# Patient Record
Sex: FEMALE | Race: WHITE | NOT HISPANIC OR LATINO | Employment: UNEMPLOYED | URBAN - METROPOLITAN AREA
[De-identification: names, ages, dates, MRNs, and addresses within clinical notes are randomized per-mention and may not be internally consistent; named-entity substitution may affect disease eponyms.]

---

## 2017-03-03 ENCOUNTER — HISTORICAL (OUTPATIENT)
Dept: LAB | Facility: HOSPITAL | Age: 47
End: 2017-03-03

## 2017-03-13 ENCOUNTER — HISTORICAL (OUTPATIENT)
Dept: RADIOLOGY | Facility: HOSPITAL | Age: 47
End: 2017-03-13

## 2017-05-08 LAB — POC BETA-HCG (QUAL): NEGATIVE

## 2017-06-07 ENCOUNTER — HISTORICAL (OUTPATIENT)
Dept: LAB | Facility: HOSPITAL | Age: 47
End: 2017-06-07

## 2017-06-07 LAB
DEPRECATED CALCIDIOL+CALCIFEROL SERPL-MC: 31.18 NG/ML (ref 30–80)
EST. AVERAGE GLUCOSE BLD GHB EST-MCNC: 117 MG/DL
HBA1C MFR BLD: 5.7 % (ref 4.5–6.2)

## 2017-06-15 LAB — RAPID GROUP A STREP (OHS): NEGATIVE

## 2017-07-04 ENCOUNTER — HOSPITAL ENCOUNTER (EMERGENCY)
Facility: HOSPITAL | Age: 47
Discharge: HOME | End: 2017-07-04
Attending: EMERGENCY MEDICINE
Payer: MEDICAID

## 2017-07-04 VITALS
TEMPERATURE: 98 F | SYSTOLIC BLOOD PRESSURE: 148 MMHG | WEIGHT: 250 LBS | RESPIRATION RATE: 16 BRPM | DIASTOLIC BLOOD PRESSURE: 71 MMHG | BODY MASS INDEX: 44.3 KG/M2 | OXYGEN SATURATION: 99 % | HEART RATE: 98 BPM | HEIGHT: 63 IN

## 2017-07-04 DIAGNOSIS — R19.7 VOMITING AND DIARRHEA: Primary | ICD-10-CM

## 2017-07-04 DIAGNOSIS — R11.10 VOMITING AND DIARRHEA: Primary | ICD-10-CM

## 2017-07-04 DIAGNOSIS — R11.10 VOMITING: ICD-10-CM

## 2017-07-04 LAB
ALBUMIN SERPL BCP-MCNC: 4 G/DL
ALP SERPL-CCNC: 85 U/L
ALT SERPL W/O P-5'-P-CCNC: 19 U/L
ANION GAP SERPL CALC-SCNC: 10 MMOL/L
AST SERPL-CCNC: 18 U/L
BASOPHILS # BLD AUTO: 0 K/UL
BASOPHILS NFR BLD: 0.1 %
BILIRUB SERPL-MCNC: 0.8 MG/DL
BUN SERPL-MCNC: 17 MG/DL
CALCIUM SERPL-MCNC: 9.2 MG/DL
CHLORIDE SERPL-SCNC: 106 MMOL/L
CO2 SERPL-SCNC: 22 MMOL/L
CREAT SERPL-MCNC: 0.8 MG/DL
DIFFERENTIAL METHOD: ABNORMAL
EOSINOPHIL # BLD AUTO: 0 K/UL
EOSINOPHIL NFR BLD: 0 %
ERYTHROCYTE [DISTWIDTH] IN BLOOD BY AUTOMATED COUNT: 14.5 %
EST. GFR  (AFRICAN AMERICAN): >60 ML/MIN/1.73 M^2
EST. GFR  (NON AFRICAN AMERICAN): >60 ML/MIN/1.73 M^2
GLUCOSE SERPL-MCNC: 126 MG/DL
HCT VFR BLD AUTO: 39.2 %
HGB BLD-MCNC: 12.9 G/DL
LYMPHOCYTES # BLD AUTO: 0.4 K/UL
LYMPHOCYTES NFR BLD: 2.4 %
MCH RBC QN AUTO: 25.7 PG
MCHC RBC AUTO-ENTMCNC: 32.8 %
MCV RBC AUTO: 78 FL
MONOCYTES # BLD AUTO: 0.2 K/UL
MONOCYTES NFR BLD: 1.1 %
NEUTROPHILS # BLD AUTO: 14.9 K/UL
NEUTROPHILS NFR BLD: 96.4 %
PLATELET # BLD AUTO: 373 K/UL
PMV BLD AUTO: 8.6 FL
POTASSIUM SERPL-SCNC: 4.3 MMOL/L
PROT SERPL-MCNC: 8.3 G/DL
RBC # BLD AUTO: 5.01 M/UL
SODIUM SERPL-SCNC: 138 MMOL/L
WBC # BLD AUTO: 15.5 K/UL

## 2017-07-04 PROCEDURE — 80053 COMPREHEN METABOLIC PANEL: CPT

## 2017-07-04 PROCEDURE — 96374 THER/PROPH/DIAG INJ IV PUSH: CPT

## 2017-07-04 PROCEDURE — 96361 HYDRATE IV INFUSION ADD-ON: CPT

## 2017-07-04 PROCEDURE — 63600175 PHARM REV CODE 636 W HCPCS: Performed by: PHYSICIAN ASSISTANT

## 2017-07-04 PROCEDURE — 36415 COLL VENOUS BLD VENIPUNCTURE: CPT

## 2017-07-04 PROCEDURE — 25000003 PHARM REV CODE 250: Performed by: PHYSICIAN ASSISTANT

## 2017-07-04 PROCEDURE — 99284 EMERGENCY DEPT VISIT MOD MDM: CPT | Mod: 25

## 2017-07-04 PROCEDURE — 85025 COMPLETE CBC W/AUTO DIFF WBC: CPT

## 2017-07-04 RX ORDER — PROMETHAZINE HYDROCHLORIDE 25 MG/1
25 SUPPOSITORY RECTAL EVERY 6 HOURS PRN
Qty: 10 SUPPOSITORY | Refills: 0 | Status: SHIPPED | OUTPATIENT
Start: 2017-07-04 | End: 2022-05-17

## 2017-07-04 RX ORDER — ONDANSETRON 2 MG/ML
4 INJECTION INTRAMUSCULAR; INTRAVENOUS
Status: COMPLETED | OUTPATIENT
Start: 2017-07-04 | End: 2017-07-04

## 2017-07-04 RX ORDER — ONDANSETRON 4 MG/1
4 TABLET, FILM COATED ORAL EVERY 6 HOURS
Qty: 15 TABLET | Refills: 0 | Status: SHIPPED | OUTPATIENT
Start: 2017-07-04 | End: 2017-07-04 | Stop reason: ALTCHOICE

## 2017-07-04 RX ADMIN — ONDANSETRON 4 MG: 2 INJECTION INTRAMUSCULAR; INTRAVENOUS at 01:07

## 2017-07-04 RX ADMIN — SODIUM CHLORIDE 1000 ML: 0.9 INJECTION, SOLUTION INTRAVENOUS at 01:07

## 2017-07-04 NOTE — DISCHARGE INSTRUCTIONS
Drink plenty of fluids.  Take zofran as needed for the nausea.  See your primary care provider in one week.  For worsening symptoms, chest pain, shortness of breath, increased abdominal pain, high grade fever, stroke or stroke like symptoms, immediately go to the nearest Emergency Room or call 911 as soon as possible.

## 2017-07-19 ENCOUNTER — HISTORICAL (OUTPATIENT)
Dept: RADIOLOGY | Facility: HOSPITAL | Age: 47
End: 2017-07-19

## 2017-08-30 ENCOUNTER — HISTORICAL (OUTPATIENT)
Dept: LAB | Facility: HOSPITAL | Age: 47
End: 2017-08-30

## 2017-08-30 LAB
EST. AVERAGE GLUCOSE BLD GHB EST-MCNC: 128 MG/DL
HBA1C MFR BLD: 6.1 % (ref 4.5–6.2)

## 2017-11-06 LAB
INFLUENZA A ANTIGEN, POC: NEGATIVE
INFLUENZA B ANTIGEN, POC: NEGATIVE

## 2017-12-11 ENCOUNTER — HISTORICAL (OUTPATIENT)
Dept: LAB | Facility: HOSPITAL | Age: 47
End: 2017-12-11

## 2017-12-11 LAB
BUN SERPL-MCNC: 12 MG/DL (ref 7–18)
CALCIUM SERPL-MCNC: 9.1 MG/DL (ref 8.5–10.1)
CHLORIDE SERPL-SCNC: 103 MMOL/L (ref 98–107)
CO2 SERPL-SCNC: 28.4 MMOL/L (ref 21–32)
CREAT SERPL-MCNC: 0.66 MG/DL (ref 0.6–1.3)
EST. AVERAGE GLUCOSE BLD GHB EST-MCNC: 120 MG/DL
GLUCOSE SERPL-MCNC: 103 MG/DL (ref 74–106)
HBA1C MFR BLD: 5.8 % (ref 4.5–6.2)
POTASSIUM SERPL-SCNC: 4.5 MMOL/L (ref 3.5–5.1)
SODIUM SERPL-SCNC: 140 MMOL/L (ref 136–145)

## 2017-12-28 LAB
INFLUENZA A ANTIGEN, POC: POSITIVE
INFLUENZA B ANTIGEN, POC: NEGATIVE

## 2018-03-26 ENCOUNTER — HISTORICAL (OUTPATIENT)
Dept: LAB | Facility: HOSPITAL | Age: 48
End: 2018-03-26

## 2018-03-26 LAB
ABS NEUT (OLG): 5.7 X10(3)/MCL (ref 2.1–9.2)
BASOPHILS # BLD AUTO: 0.1 X10(3)/MCL
BASOPHILS NFR BLD AUTO: 1 % (ref 0–2)
CHOLEST SERPL-MCNC: 164 MG/DL (ref 0–200)
CHOLEST/HDLC SERPL: 3.6 {RATIO} (ref 0–4)
EOSINOPHIL # BLD AUTO: 0.2 X10(3)/MCL
EOSINOPHIL NFR BLD AUTO: 2 %
ERYTHROCYTE [DISTWIDTH] IN BLOOD BY AUTOMATED COUNT: 15 % (ref 11.5–17)
EST. AVERAGE GLUCOSE BLD GHB EST-MCNC: 120 MG/DL
HBA1C MFR BLD: 5.8 % (ref 4.5–6.2)
HCT VFR BLD AUTO: 34.3 % (ref 37–47)
HDLC SERPL-MCNC: 45 MG/DL (ref 40–60)
HGB BLD-MCNC: 10.6 GM/DL (ref 12–16)
LDLC SERPL CALC-MCNC: 97 MG/DL (ref 0–129)
LYMPHOCYTES # BLD AUTO: 2.2 X10(3)/MCL
LYMPHOCYTES NFR BLD AUTO: 24 % (ref 13–40)
MCH RBC QN AUTO: 23.5 PG (ref 27–31)
MCHC RBC AUTO-ENTMCNC: 30.8 GM/DL (ref 33–36)
MCV RBC AUTO: 76.2 FL (ref 80–94)
MONOCYTES # BLD AUTO: 0.8 X10(3)/MCL
MONOCYTES NFR BLD AUTO: 9 % (ref 2–11)
NEUTROPHILS # BLD AUTO: 5.7 X10(3)/MCL (ref 2.1–9.2)
NEUTROPHILS NFR BLD AUTO: 64 % (ref 47–80)
PLATELET # BLD AUTO: 445 X10(3)/MCL (ref 130–400)
PMV BLD AUTO: 8.9 FL (ref 7.4–10.4)
RBC # BLD AUTO: 4.51 X10(6)/MCL (ref 4.2–5.4)
TRIGL SERPL-MCNC: 109 MG/DL
VLDLC SERPL CALC-MCNC: 22 MG/DL
WBC # SPEC AUTO: 9 X10(3)/MCL (ref 4.5–11.5)

## 2018-04-11 ENCOUNTER — HISTORICAL (OUTPATIENT)
Dept: LAB | Facility: HOSPITAL | Age: 48
End: 2018-04-11

## 2018-04-20 LAB — RAPID GROUP A STREP (OHS): NEGATIVE

## 2018-04-24 ENCOUNTER — HISTORICAL (OUTPATIENT)
Dept: RADIOLOGY | Facility: HOSPITAL | Age: 48
End: 2018-04-24

## 2018-06-27 ENCOUNTER — HISTORICAL (OUTPATIENT)
Dept: LAB | Facility: HOSPITAL | Age: 48
End: 2018-06-27

## 2018-06-27 LAB
ABS NEUT (OLG): 6.4 X10(3)/MCL (ref 2.1–9.2)
BASOPHILS # BLD AUTO: 0.1 X10(3)/MCL
BASOPHILS NFR BLD AUTO: 1 % (ref 0–2)
EOSINOPHIL # BLD AUTO: 0.2 X10(3)/MCL
EOSINOPHIL NFR BLD AUTO: 2 %
ERYTHROCYTE [DISTWIDTH] IN BLOOD BY AUTOMATED COUNT: 16 % (ref 11.5–17)
HCT VFR BLD AUTO: 35.8 % (ref 37–47)
HGB BLD-MCNC: 11 GM/DL (ref 12–16)
LYMPHOCYTES # BLD AUTO: 2.6 X10(3)/MCL
LYMPHOCYTES NFR BLD AUTO: 25 % (ref 13–40)
MCH RBC QN AUTO: 22.8 PG (ref 27–31)
MCHC RBC AUTO-ENTMCNC: 30.7 GM/DL (ref 33–36)
MCV RBC AUTO: 74.3 FL (ref 80–94)
MONOCYTES # BLD AUTO: 1 X10(3)/MCL
MONOCYTES NFR BLD AUTO: 10 % (ref 2–11)
NEUTROPHILS # BLD AUTO: 6.4 X10(3)/MCL (ref 2.1–9.2)
NEUTROPHILS NFR BLD AUTO: 62 % (ref 47–80)
PLATELET # BLD AUTO: 480 X10(3)/MCL (ref 130–400)
PMV BLD AUTO: 9.2 FL (ref 7.4–10.4)
RBC # BLD AUTO: 4.82 X10(6)/MCL (ref 4.2–5.4)
WBC # SPEC AUTO: 10.2 X10(3)/MCL (ref 4.5–11.5)

## 2018-07-02 LAB — FINAL CULTURE: NORMAL

## 2018-07-16 ENCOUNTER — HISTORICAL (OUTPATIENT)
Dept: LAB | Facility: HOSPITAL | Age: 48
End: 2018-07-16

## 2018-07-16 LAB
BILIRUB SERPL-MCNC: NEGATIVE MG/DL
BLOOD URINE, POC: NORMAL
CLARITY, POC UA: NORMAL
COLOR, POC UA: NORMAL
GLUCOSE UR QL STRIP: NEGATIVE
KETONES UR QL STRIP: NEGATIVE
LEUKOCYTE EST, POC UA: NEGATIVE
NITRITE, POC UA: NEGATIVE
PH, POC UA: 5.5
PROTEIN, POC: NORMAL
SPECIFIC GRAVITY, POC UA: 1.03
UROBILINOGEN, POC UA: NORMAL

## 2018-07-19 LAB — FINAL CULTURE: NO GROWTH

## 2018-07-20 LAB — FINAL CULTURE: NORMAL

## 2018-07-23 ENCOUNTER — HISTORICAL (OUTPATIENT)
Dept: LAB | Facility: HOSPITAL | Age: 48
End: 2018-07-23

## 2018-07-23 LAB
ABS NEUT (OLG): 4.3 X10(3)/MCL (ref 2.1–9.2)
ALBUMIN SERPL-MCNC: 3.9 GM/DL (ref 3.4–5)
ALBUMIN/GLOB SERPL: 1.1 RATIO (ref 1.1–2)
ALP SERPL-CCNC: 91 UNIT/L (ref 46–116)
ALT SERPL-CCNC: 23 UNIT/L (ref 12–78)
AST SERPL-CCNC: 19 UNIT/L (ref 15–37)
BASOPHILS # BLD AUTO: 0.1 X10(3)/MCL
BASOPHILS NFR BLD AUTO: 1 % (ref 0–2)
BILIRUB SERPL-MCNC: 0.3 MG/DL (ref 0.2–1)
BILIRUBIN DIRECT+TOT PNL SERPL-MCNC: 0.08 MG/DL (ref 0–0.2)
BILIRUBIN DIRECT+TOT PNL SERPL-MCNC: 0.22 MG/DL (ref 0–0.8)
BUN SERPL-MCNC: 10.2 MG/DL (ref 7–18)
CALCIUM SERPL-MCNC: 8.9 MG/DL (ref 8.5–10.1)
CHLORIDE SERPL-SCNC: 102 MMOL/L (ref 98–107)
CO2 SERPL-SCNC: 26.1 MMOL/L (ref 21–32)
CREAT SERPL-MCNC: 0.75 MG/DL (ref 0.6–1.3)
EOSINOPHIL # BLD AUTO: 0.2 X10(3)/MCL
EOSINOPHIL NFR BLD AUTO: 3 %
ERYTHROCYTE [DISTWIDTH] IN BLOOD BY AUTOMATED COUNT: 19.9 % (ref 11.5–17)
EST. AVERAGE GLUCOSE BLD GHB EST-MCNC: 114 MG/DL
FERRITIN SERPL-MCNC: 27 NG/ML (ref 8–388)
GLOBULIN SER-MCNC: 3.7 GM/DL (ref 2.4–3.5)
GLUCOSE SERPL-MCNC: 94 MG/DL (ref 74–106)
HBA1C MFR BLD: 5.6 % (ref 4.5–6.2)
HCT VFR BLD AUTO: 37.3 % (ref 37–47)
HGB BLD-MCNC: 11.7 GM/DL (ref 12–16)
IRON SATN MFR SERPL: 12.9 % (ref 20–50)
IRON SERPL-MCNC: 44 MCG/DL (ref 50–175)
LYMPHOCYTES # BLD AUTO: 2.3 X10(3)/MCL
LYMPHOCYTES NFR BLD AUTO: 30 % (ref 13–40)
MCH RBC QN AUTO: 24.1 PG (ref 27–31)
MCHC RBC AUTO-ENTMCNC: 31.3 GM/DL (ref 33–36)
MCV RBC AUTO: 76.9 FL (ref 80–94)
MONOCYTES # BLD AUTO: 0.7 X10(3)/MCL
MONOCYTES NFR BLD AUTO: 9 % (ref 2–11)
NEUTROPHILS # BLD AUTO: 4.3 X10(3)/MCL (ref 2.1–9.2)
NEUTROPHILS NFR BLD AUTO: 57 % (ref 47–80)
PLATELET # BLD AUTO: 480 X10(3)/MCL (ref 130–400)
PMV BLD AUTO: 9.1 FL (ref 7.4–10.4)
POTASSIUM SERPL-SCNC: 4.7 MMOL/L (ref 3.5–5.1)
PROT SERPL-MCNC: 7.6 GM/DL (ref 6.4–8.2)
RBC # BLD AUTO: 4.85 X10(6)/MCL (ref 4.2–5.4)
SODIUM SERPL-SCNC: 140 MMOL/L (ref 136–145)
TIBC SERPL-MCNC: 342 MCG/DL (ref 250–450)
TRANSFERRIN SERPL-MCNC: 244 MG/DL (ref 200–360)
WBC # SPEC AUTO: 7.6 X10(3)/MCL (ref 4.5–11.5)

## 2018-08-21 ENCOUNTER — HISTORICAL (OUTPATIENT)
Dept: RADIOLOGY | Facility: HOSPITAL | Age: 48
End: 2018-08-21

## 2018-09-24 ENCOUNTER — HISTORICAL (OUTPATIENT)
Dept: SURGERY | Facility: HOSPITAL | Age: 48
End: 2018-09-24

## 2018-09-24 LAB
APPEARANCE, UA: ABNORMAL
BACTERIA SPEC CULT: ABNORMAL /HPF
BILIRUB UR QL STRIP: NEGATIVE
COLOR UR: ABNORMAL
GLUCOSE (UA): NEGATIVE
HGB UR QL STRIP: ABNORMAL
KETONES UR QL STRIP: NEGATIVE
LEUKOCYTE ESTERASE UR QL STRIP: NEGATIVE
NITRITE UR QL STRIP: NEGATIVE
PH UR STRIP: 6 [PH]
PROT UR QL STRIP: NEGATIVE
RBC #/AREA URNS HPF: ABNORMAL /HPF
SP GR UR STRIP: >=1.03
SQUAMOUS EPITHELIAL, UA: ABNORMAL /LPF
UROBILINOGEN UR STRIP-ACNC: 0.2 EU/DL
WBC #/AREA URNS HPF: ABNORMAL /[HPF]

## 2018-09-25 ENCOUNTER — HISTORICAL (OUTPATIENT)
Dept: ANESTHESIOLOGY | Facility: HOSPITAL | Age: 48
End: 2018-09-25

## 2018-12-20 ENCOUNTER — OFFICE VISIT (OUTPATIENT)
Dept: SURGERY | Facility: CLINIC | Age: 48
End: 2018-12-20
Payer: MEDICAID

## 2018-12-20 ENCOUNTER — HOSPITAL ENCOUNTER (OUTPATIENT)
Dept: RADIOLOGY | Facility: HOSPITAL | Age: 48
Discharge: HOME | End: 2018-12-20
Attending: SURGERY
Payer: MEDICAID

## 2018-12-20 VITALS
TEMPERATURE: 98 F | DIASTOLIC BLOOD PRESSURE: 90 MMHG | BODY MASS INDEX: 44.68 KG/M2 | SYSTOLIC BLOOD PRESSURE: 139 MMHG | WEIGHT: 252.19 LBS | HEIGHT: 63 IN | HEART RATE: 95 BPM

## 2018-12-20 DIAGNOSIS — K21.00 GASTROESOPHAGEAL REFLUX DISEASE WITH ESOPHAGITIS: ICD-10-CM

## 2018-12-20 DIAGNOSIS — R11.0 NAUSEA: ICD-10-CM

## 2018-12-20 DIAGNOSIS — K20.90 ESOPHAGITIS: Primary | ICD-10-CM

## 2018-12-20 DIAGNOSIS — R11.0 NAUSEA: Primary | ICD-10-CM

## 2018-12-20 DIAGNOSIS — Z98.84 HX OF LAPAROSCOPIC ADJUSTABLE GASTRIC BANDING: ICD-10-CM

## 2018-12-20 PROCEDURE — 74241 FL UPPER GI W KUB: CPT | Mod: TC,FY

## 2018-12-20 PROCEDURE — 99204 OFFICE O/P NEW MOD 45 MIN: CPT | Mod: S$PBB,,, | Performed by: SURGERY

## 2018-12-20 PROCEDURE — 71046 X-RAY EXAM CHEST 2 VIEWS: CPT | Mod: 26,,, | Performed by: RADIOLOGY

## 2018-12-20 PROCEDURE — 71046 X-RAY EXAM CHEST 2 VIEWS: CPT | Mod: TC,FY

## 2018-12-20 PROCEDURE — 99213 OFFICE O/P EST LOW 20 MIN: CPT | Mod: PBBFAC,25 | Performed by: SURGERY

## 2018-12-20 PROCEDURE — 99999 PR PBB SHADOW E&M-EST. PATIENT-LVL III: CPT | Mod: PBBFAC,,, | Performed by: SURGERY

## 2018-12-20 PROCEDURE — 74241 FL UPPER GI W KUB: CPT | Mod: 26,,, | Performed by: RADIOLOGY

## 2018-12-20 RX ORDER — FERROUS SULFATE 325(65) MG
1 TABLET ORAL DAILY
Refills: 0 | COMMUNITY
Start: 2018-10-29 | End: 2018-12-20 | Stop reason: SDUPTHER

## 2018-12-20 RX ORDER — DOXYCYCLINE HYCLATE 100 MG
TABLET ORAL
Refills: 0 | COMMUNITY
Start: 2018-10-01 | End: 2022-05-17

## 2018-12-20 RX ORDER — CIPROFLOXACIN 500 MG/1
TABLET ORAL
Refills: 0 | COMMUNITY
Start: 2018-10-05 | End: 2022-05-17 | Stop reason: ALTCHOICE

## 2018-12-20 RX ORDER — FERROUS SULFATE 325(65) MG
TABLET ORAL
COMMUNITY
Start: 2018-07-31 | End: 2022-07-12

## 2018-12-20 RX ORDER — ALBUTEROL SULFATE 90 UG/1
AEROSOL, METERED RESPIRATORY (INHALATION)
COMMUNITY
Start: 2018-04-27 | End: 2023-07-25 | Stop reason: SDUPTHER

## 2018-12-20 RX ORDER — MECLIZINE HYDROCHLORIDE 25 MG/1
TABLET ORAL
COMMUNITY
Start: 2018-07-23 | End: 2022-09-01 | Stop reason: SDUPTHER

## 2018-12-20 RX ORDER — TRIAMCINOLONE ACETONIDE 0.25 MG/G
CREAM TOPICAL
COMMUNITY
Start: 2018-04-27 | End: 2022-09-07 | Stop reason: ALTCHOICE

## 2018-12-20 RX ORDER — HYDROCODONE BITARTRATE AND ACETAMINOPHEN 5; 325 MG/1; MG/1
TABLET ORAL
COMMUNITY
Start: 2018-07-23 | End: 2022-05-17

## 2018-12-20 RX ORDER — OXYCODONE AND ACETAMINOPHEN 5; 325 MG/1; MG/1
1 TABLET ORAL EVERY 6 HOURS PRN
Refills: 0 | COMMUNITY
Start: 2018-09-25 | End: 2022-05-17

## 2018-12-20 RX ORDER — HYDROXYZINE HYDROCHLORIDE 25 MG/1
TABLET, FILM COATED ORAL
COMMUNITY
Start: 2018-06-28 | End: 2022-05-17

## 2018-12-20 RX ORDER — METRONIDAZOLE 500 MG/1
TABLET ORAL
Refills: 0 | COMMUNITY
Start: 2018-10-05 | End: 2022-05-17

## 2018-12-20 RX ORDER — MECLIZINE HYDROCHLORIDE 25 MG/1
25 TABLET ORAL 3 TIMES DAILY PRN
Refills: 0 | COMMUNITY
Start: 2018-12-03 | End: 2022-05-17 | Stop reason: SDUPTHER

## 2018-12-20 RX ORDER — MUPIROCIN 20 MG/G
OINTMENT TOPICAL
COMMUNITY
Start: 2018-07-30 | End: 2022-05-17

## 2018-12-20 RX ORDER — OMEPRAZOLE 40 MG/1
40 CAPSULE, DELAYED RELEASE ORAL EVERY MORNING
Refills: 10 | COMMUNITY
Start: 2018-10-29 | End: 2018-12-20 | Stop reason: SDUPTHER

## 2018-12-20 RX ORDER — METFORMIN HYDROCHLORIDE 500 MG/1
TABLET ORAL
Refills: 0 | COMMUNITY
Start: 2018-10-29 | End: 2022-07-12

## 2018-12-20 RX ORDER — IBUPROFEN 800 MG/1
TABLET ORAL
COMMUNITY
Start: 2018-09-17 | End: 2022-05-17 | Stop reason: CLARIF

## 2018-12-20 RX ORDER — SULFAMETHOXAZOLE AND TRIMETHOPRIM 800; 160 MG/1; MG/1
1 TABLET ORAL 2 TIMES DAILY
Refills: 0 | COMMUNITY
Start: 2018-10-15 | End: 2022-05-17

## 2018-12-20 RX ORDER — DOCUSATE SODIUM 100 MG/1
CAPSULE, LIQUID FILLED ORAL
Refills: 0 | COMMUNITY
Start: 2018-11-05 | End: 2022-09-07

## 2018-12-20 RX ORDER — OMEPRAZOLE 40 MG/1
40 CAPSULE, DELAYED RELEASE ORAL
COMMUNITY
Start: 2018-06-14 | End: 2022-09-07

## 2018-12-20 RX ORDER — METFORMIN HYDROCHLORIDE 500 MG/1
TABLET ORAL
COMMUNITY
Start: 2018-07-23 | End: 2022-07-12

## 2018-12-20 RX ORDER — CALCIUM CITRATE/VITAMIN D3 200MG-6.25
TABLET ORAL
Refills: 2 | COMMUNITY
Start: 2018-10-17

## 2018-12-20 NOTE — LETTER
December 22, 2018      Heriberto Gonzalez MD  [address] [facility] [address]  [facility] [address]           [facility] - General Surgery  [address]  [place] [address]  Phone: [phone]          Patient: Lilian Arroyo   MR Number: [id number]   YOB: 1970   Date of Visit: 12/20/2018       Dear Dr. Heriberto Gonzalez:    Thank you for referring Lilian Arroyo to me for evaluation. Attached you will find relevant portions of my assessment and plan of care.    If you have questions, please do not hesitate to call me. I look forward to following Lilian Arroyo along with you.    Sincerely,    Xochitl Landry MD    Enclosure  CC:  No Recipients    If you would like to receive this communication electronically, please contact [email or web address] or [phone] to request more information on [employer] access.    For providers and/or their staff who would like to refer a patient to [facility], please contact us through our one-stop-shop provider referral line, [facility] , at [phone].    If you feel you have received this communication in error or would no longer like to receive these types of communications, please e-mail [email or web address]

## 2018-12-20 NOTE — PROGRESS NOTES
History & Physical    SUBJECTIVE:     History of Present Illness:  Patient is a 48 y.o. female presents with chronic nausea that is usually post-prandial and vomiting 1-2x/week. She also endorses acid reflux with pain starting in the epigastrum and radiating up her throat. Eating fried foods worsens her symptoms. She takes Omeprazole for heartburn and it sometimes helps.     She was seen by referring provider Dr. Gonzalez in [place] and underwent EGD which showed: normal duodenum, gastritis, area proximal to the band with retained food, esophagitis and irregularity in the GE junction.  An EGD performed in 2017 apparently showed a hiatal hernia.    She had a lap gastric band placed in 2006 and lost 85 lbs, gained 50 lb back. Her and her  think there may be nothing in the band at this time. Lap roel in 2001.  She also has hidradenitis of both axillae and had excision 2 months ago on the right side; states the wound opened about 2 weeks ago and she has been doing local wound care.    Review of patient's allergies indicates:  No Known Allergies    Current Outpatient Medications   Medication Sig Dispense Refill    albuterol (VENTOLIN HFA) 90 mcg/actuation inhaler inhale1 puff by mouth 4 times per day as needed for wheezing      docusate sodium (COLACE) 100 MG capsule TAKE ONE CAPSULE BY MOUTH TWICE DAILY as needed for FOR CONSTIPATION  0    ferrous sulfate (FEOSOL) 325 mg (65 mg iron) Tab tablet       ibuprofen (ADVIL,MOTRIN) 800 MG tablet       meclizine (ANTIVERT) 25 mg tablet TAKE ONE TABLET BY MOUTH THREE TIMES DAILY as needed for dizziness      metFORMIN (GLUCOPHAGE) 500 MG tablet TAKE ONE TABLET BY MOUTH DAILY with a meal      omeprazole (PRILOSEC) 40 MG capsule Take 40 mg by mouth.      promethazine (PHENERGAN) 25 MG suppository Place 1 suppository (25 mg total) rectally every 6 (six) hours as needed for Nausea. 10 suppository 0    triamcinolone acetonide 0.025% (KENALOG) 0.025 % cream apply  topically to the affected area 4 times per day as needed for rash for 14 days      ciprofloxacin HCl (CIPRO) 500 MG tablet TAKE ONE TABLET BY MOUTH EVERY TWELVE HOURS FOR 7 DAYS  0    doxycycline (VIBRA-TABS) 100 MG tablet TAKE ONE TABLET BY MOUTH TWICE DAILY FOR FOURTEEN DAYS  0    HYDROcodone-acetaminophen (NORCO) 5-325 mg per tablet TAKE TWO TABLETS BY MOUTH EVERY 6 HOURS AS NEEDED FOR PAIN      hydrOXYzine HCl (ATARAX) 25 MG tablet TAKE ONE TABLET BY MOUTH FOUR TIMES DAILY FOR 5 DAYS AS NEEDED FOR ANXIETY. may cause drowsiness      meclizine (ANTIVERT) 25 mg tablet Take 25 mg by mouth 3 (three) times daily as needed.  0    metFORMIN (GLUCOPHAGE) 500 MG tablet TAKE ONE TABLET BY MOUTH DAILY with a meal  0    metroNIDAZOLE (FLAGYL) 500 MG tablet TAKE ONE TABLET BY MOUTH EVERY TWELVE HOURS FOR 7 DAYS  0    mupirocin (BACTROBAN) 2 % ointment apply to the affected area 3 times per day for 5 days      oxyCODONE-acetaminophen (PERCOCET) 5-325 mg per tablet Take 1 tablet by mouth every 6 (six) hours as needed.  0    PAIN AND FEVER 325 mg tablet       sulfamethoxazole-trimethoprim 800-160mg (BACTRIM DS) 800-160 mg Tab Take 1 tablet by mouth 2 (two) times daily.  0    TRUE METRIX GLUCOSE TEST STRIP Strp test once daily  2     No current facility-administered medications for this visit.        Past Medical History:   Diagnosis Date    Asthma     GERD (gastroesophageal reflux disease)     Hidradenitis axillaris     bilateral, had surgery on right    Insulin resistance      Past Surgical History:   Procedure Laterality Date    AXILLARY HIDRADENITIS EXCISION  in 10/2018    Dr. Payan in [facility], right side, wound dehisced    CHOLECYSTECTOMY      LAPAROSCOPIC GASTRIC BANDING  2006    [place], [place]    TONSILLECTOMY       No family history on file.  Social History     Tobacco Use    Smoking status: Former Smoker    Smokeless tobacco: Former User   Substance Use Topics    Alcohol use: Not on file     "Drug use: Not on file        Review of Systems:  Review of Systems   Constitutional: Negative for activity change, appetite change, chills, fatigue, fever and unexpected weight change.   HENT: Positive for postnasal drip and rhinorrhea. Negative for sore throat and trouble swallowing.    Respiratory: Negative for shortness of breath.    Cardiovascular: Negative for chest pain, palpitations and leg swelling.   Gastrointestinal: Positive for abdominal pain (sometimes LLQ, usually epigastric), constipation (relieved with stool softener), nausea and vomiting. Negative for abdominal distention, blood in stool and diarrhea.   Genitourinary: Negative for difficulty urinating.   Musculoskeletal: Negative.    Skin: Negative.    Neurological: Positive for dizziness (vertigo).   Hematological: Does not bruise/bleed easily.       OBJECTIVE:     Vital Signs (Most Recent)  Temp: 98 °F (36.7 °C) (12/20/18 1005)  Pulse: 95 (12/20/18 1005)  BP: (!) 139/90 (12/20/18 1005)  5' 3" (1.6 m)  114.4 kg (252 lb 3.3 oz)     Physical Exam:  Physical Exam   Constitutional: She is oriented to person, place, and time. She appears well-developed and well-nourished. No distress.   obese   HENT:   Head: Normocephalic and atraumatic.   Eyes: EOM are normal.   Neck: Normal range of motion.   Cardiovascular: Normal rate, regular rhythm, normal heart sounds and intact distal pulses.   Pulmonary/Chest: Effort normal and breath sounds normal. No respiratory distress.   Abdominal: Soft. Bowel sounds are normal. She exhibits no distension. There is no tenderness. No hernia.   Surgical scars well healed, band port site palpable   Musculoskeletal: Normal range of motion.   Neurological: She is alert and oriented to person, place, and time.   Skin: Skin is warm and dry. Capillary refill takes less than 2 seconds.   Right axilla with 1.5 cm x 1.5 cm x 1 cm round wound with pink granulation tissue in the bed and no sign of infection   Nursing note and vitals " reviewed.      Laboratory  none    Diagnostic Results:  EGD results reviewed.    ASSESSMENT/PLAN:     Ms. Arroyo is a 47 yo woman with hx gastric band placement presenting with esophagitis, nausea, vomiting and abdominal pain with possible hiatal hernia seen on EGD in 2017.    PLAN:Plan     - attempted to access band port today in clinic - not able to aspirate any fluid  - order abdominal xray today to evaluate position of gastric band  - schedule UGI series to better evaluate for hiatal hernia  - pending test results, will refer to [facility] for possible band removal with conversion to RNY gastric bypass  - continue local wound care to the right axillary wound

## 2018-12-28 ENCOUNTER — TELEPHONE (OUTPATIENT)
Dept: SURGERY | Facility: CLINIC | Age: 48
End: 2018-12-28

## 2018-12-28 NOTE — TELEPHONE ENCOUNTER
Returned call to pt's ; no answer.  Left message with voicemail.    ----- Message from Justin Man sent at 12/28/2018 11:15 AM CST -----  Contact: pt spouse  Please call pt spouse at [phone]    Patient is requesting test results and questions regarding the [facility] referral    Thank you

## 2018-12-31 ENCOUNTER — TELEPHONE (OUTPATIENT)
Dept: SURGERY | Facility: CLINIC | Age: 48
End: 2018-12-31

## 2018-12-31 ENCOUNTER — TELEPHONE (OUTPATIENT)
Dept: BARIATRICS | Facility: CLINIC | Age: 48
End: 2018-12-31

## 2018-12-31 NOTE — TELEPHONE ENCOUNTER
Discussed with Dr. Landry and she stated that resident reported confirmed receipt of referral from [facility].  Called patient and informed her that referral was confirmed received per [facility].  Instructed her to call [facility] to schedule appt or proceed to [facility] ER if symptoms worsen and need emergent attention.  Patient verbalized understanding of instructions.

## 2018-12-31 NOTE — TELEPHONE ENCOUNTER
----- Message from Parviz Montes MA sent at 12/28/2018 11:38 AM CST -----  Contact: pt spouse  I think this pt is bariatric  pt based on the last note From Dr. Landry if she isn't Ill take care of it. Thank You   ----- Message -----  From: Justin Man  Sent: 12/28/2018  11:15 AM  To: Herb Leung    Please call pt spouse at [phone]    Patient is requesting test results and questions regarding the [facility] referral    Thank you

## 2019-01-02 ENCOUNTER — TELEPHONE (OUTPATIENT)
Dept: BARIATRICS | Facility: CLINIC | Age: 49
End: 2019-01-02

## 2019-01-02 NOTE — TELEPHONE ENCOUNTER
Spoke with pt explained we do not accept medicaid pts at our clinic. Pt to through [facility]. If urgent pt to go through [facility] ER

## 2019-01-02 NOTE — TELEPHONE ENCOUNTER
----- Message from Laverne Vernon RN sent at 1/2/2019  1:23 PM CST -----  Contact: pts spouse Iain Fu,    Dr. Landry saw Mrs. Arroyo in the General Surgery clinic, but she is interested in having her lap band removed.  Dr. Landry referred the pt to [facility] for this and for a sleeve, however, there is a 6 month wait period.  The pt is interested in speaking with someone in Bariatrics about having her lap band removed.    Thanks,    Laverne  ----- Message -----  From: Sweta Archuleta  Sent: 1/2/2019   1:15 PM  To: Herb Leung    Needs Advice    Reason for call: Iain is calling to speak with Laverne or one of the nurses they want to know if Dr Landry can take the lap ban out the pt said [facility] is on a 6 month wait list and have the bariatric surgery later         Communication Preference: can you please call pt at [phone]    Additional Information: none    MIGUEL

## 2019-01-03 ENCOUNTER — HISTORICAL (OUTPATIENT)
Dept: LAB | Facility: HOSPITAL | Age: 49
End: 2019-01-03

## 2019-01-03 LAB
HBV CORE IGM SERPL QL IA: NEGATIVE
HBV SURFACE AG SERPL QL IA: NEGATIVE
HCV AB SERPL QL IA: NEGATIVE

## 2019-02-07 ENCOUNTER — HISTORICAL (OUTPATIENT)
Dept: LAB | Facility: HOSPITAL | Age: 49
End: 2019-02-07

## 2019-02-07 LAB
BILIRUB SERPL-MCNC: NEGATIVE MG/DL
BLOOD URINE, POC: NORMAL
CLARITY, POC UA: CLEAR
COLOR, POC UA: NORMAL
EST. AVERAGE GLUCOSE BLD GHB EST-MCNC: 111 MG/DL
GLUCOSE UR QL STRIP: NEGATIVE
HBA1C MFR BLD: 5.5 % (ref 4.5–6.2)
KETONES UR QL STRIP: NEGATIVE
LEUKOCYTE EST, POC UA: NEGATIVE
NITRITE, POC UA: NEGATIVE
PH, POC UA: 7
POC BETA-HCG (QUAL): NEGATIVE
PROTEIN, POC: NEGATIVE
SPECIFIC GRAVITY, POC UA: 1.02
UROBILINOGEN, POC UA: NORMAL

## 2019-02-10 LAB — FINAL CULTURE: NORMAL

## 2019-02-22 ENCOUNTER — HISTORICAL (OUTPATIENT)
Dept: RADIOLOGY | Facility: HOSPITAL | Age: 49
End: 2019-02-22

## 2019-04-26 ENCOUNTER — HISTORICAL (OUTPATIENT)
Dept: LAB | Facility: HOSPITAL | Age: 49
End: 2019-04-26

## 2019-05-02 LAB — FINAL CULTURE: NORMAL

## 2019-05-30 ENCOUNTER — HISTORICAL (OUTPATIENT)
Dept: RADIOLOGY | Facility: HOSPITAL | Age: 49
End: 2019-05-30

## 2019-08-09 ENCOUNTER — HISTORICAL (OUTPATIENT)
Dept: LAB | Facility: HOSPITAL | Age: 49
End: 2019-08-09

## 2019-08-09 LAB
ALBUMIN SERPL-MCNC: 3.7 GM/DL (ref 3.4–5)
ALBUMIN/GLOB SERPL: 1 RATIO (ref 1.1–2)
ALP SERPL-CCNC: 70 UNIT/L (ref 46–116)
ALT SERPL-CCNC: 35 UNIT/L (ref 12–78)
AST SERPL-CCNC: 24 UNIT/L (ref 15–37)
BILIRUB SERPL-MCNC: 0.4 MG/DL (ref 0.2–1)
BILIRUB SERPL-MCNC: NEGATIVE MG/DL
BILIRUBIN DIRECT+TOT PNL SERPL-MCNC: 0.09 MG/DL (ref 0–0.2)
BILIRUBIN DIRECT+TOT PNL SERPL-MCNC: 0.31 MG/DL (ref 0–0.8)
BLOOD URINE, POC: NORMAL
BUN SERPL-MCNC: 12.1 MG/DL (ref 7–18)
CALCIUM SERPL-MCNC: 9.3 MG/DL (ref 8.5–10.1)
CHLORIDE SERPL-SCNC: 103 MMOL/L (ref 98–107)
CHOLEST SERPL-MCNC: 205 MG/DL (ref 0–200)
CHOLEST/HDLC SERPL: 4.4 {RATIO} (ref 0–4)
CLARITY, POC UA: CLEAR
CO2 SERPL-SCNC: 28.7 MMOL/L (ref 21–32)
COLOR, POC UA: NORMAL
CREAT SERPL-MCNC: 0.72 MG/DL (ref 0.6–1.3)
EST. AVERAGE GLUCOSE BLD GHB EST-MCNC: 103 MG/DL
GLOBULIN SER-MCNC: 3.7 GM/DL (ref 2.4–3.5)
GLUCOSE SERPL-MCNC: 102 MG/DL (ref 74–106)
GLUCOSE UR QL STRIP: NEGATIVE
HBA1C MFR BLD: 5.2 % (ref 4.5–6.2)
HDLC SERPL-MCNC: 47 MG/DL (ref 40–60)
KETONES UR QL STRIP: NEGATIVE
LDLC SERPL CALC-MCNC: 123 MG/DL (ref 0–129)
LEUKOCYTE EST, POC UA: NEGATIVE
NITRITE, POC UA: NEGATIVE
PH, POC UA: 6.5
POTASSIUM SERPL-SCNC: 3.9 MMOL/L (ref 3.5–5.1)
PROT SERPL-MCNC: 7.4 GM/DL (ref 6.4–8.2)
PROTEIN, POC: NEGATIVE
SODIUM SERPL-SCNC: 141 MMOL/L (ref 136–145)
SPECIFIC GRAVITY, POC UA: 1.02
TRIGL SERPL-MCNC: 177 MG/DL
UROBILINOGEN, POC UA: NORMAL
VLDLC SERPL CALC-MCNC: 35 MG/DL

## 2019-08-15 ENCOUNTER — HISTORICAL (OUTPATIENT)
Dept: RADIOLOGY | Facility: HOSPITAL | Age: 49
End: 2019-08-15

## 2019-10-16 ENCOUNTER — HISTORICAL (OUTPATIENT)
Dept: LAB | Facility: HOSPITAL | Age: 49
End: 2019-10-16

## 2019-10-16 LAB
ERYTHROCYTE [SEDIMENTATION RATE] IN BLOOD: 25 MM/HR (ref 0–20)
RHEUMATOID FACT SERPL-ACNC: <10 IU/ML (ref 0–15)

## 2019-10-24 ENCOUNTER — HISTORICAL (OUTPATIENT)
Dept: PHYSICAL THERAPY | Facility: HOSPITAL | Age: 49
End: 2019-10-24

## 2019-10-30 ENCOUNTER — HISTORICAL (OUTPATIENT)
Dept: PHYSICAL THERAPY | Facility: HOSPITAL | Age: 49
End: 2019-10-30

## 2019-11-01 ENCOUNTER — HISTORICAL (OUTPATIENT)
Dept: PHYSICAL THERAPY | Facility: HOSPITAL | Age: 49
End: 2019-11-01

## 2019-11-05 ENCOUNTER — HISTORICAL (OUTPATIENT)
Dept: PHYSICAL THERAPY | Facility: HOSPITAL | Age: 49
End: 2019-11-05

## 2019-11-07 ENCOUNTER — HISTORICAL (OUTPATIENT)
Dept: PHYSICAL THERAPY | Facility: HOSPITAL | Age: 49
End: 2019-11-07

## 2019-11-13 ENCOUNTER — HISTORICAL (OUTPATIENT)
Dept: PHYSICAL THERAPY | Facility: HOSPITAL | Age: 49
End: 2019-11-13

## 2019-11-15 ENCOUNTER — HISTORICAL (OUTPATIENT)
Dept: PHYSICAL THERAPY | Facility: HOSPITAL | Age: 49
End: 2019-11-15

## 2019-11-18 ENCOUNTER — HISTORICAL (OUTPATIENT)
Dept: PHYSICAL THERAPY | Facility: HOSPITAL | Age: 49
End: 2019-11-18

## 2019-11-20 ENCOUNTER — HISTORICAL (OUTPATIENT)
Dept: PHYSICAL THERAPY | Facility: HOSPITAL | Age: 49
End: 2019-11-20

## 2019-11-27 ENCOUNTER — HISTORICAL (OUTPATIENT)
Dept: PHYSICAL THERAPY | Facility: HOSPITAL | Age: 49
End: 2019-11-27

## 2019-12-11 ENCOUNTER — HISTORICAL (OUTPATIENT)
Dept: RADIOLOGY | Facility: HOSPITAL | Age: 49
End: 2019-12-11

## 2020-01-23 ENCOUNTER — HISTORICAL (OUTPATIENT)
Dept: PHYSICAL THERAPY | Facility: HOSPITAL | Age: 50
End: 2020-01-23

## 2020-01-29 ENCOUNTER — HISTORICAL (OUTPATIENT)
Dept: PHYSICAL THERAPY | Facility: HOSPITAL | Age: 50
End: 2020-01-29

## 2020-02-03 ENCOUNTER — HISTORICAL (OUTPATIENT)
Dept: PHYSICAL THERAPY | Facility: HOSPITAL | Age: 50
End: 2020-02-03

## 2020-02-05 ENCOUNTER — HISTORICAL (OUTPATIENT)
Dept: PHYSICAL THERAPY | Facility: HOSPITAL | Age: 50
End: 2020-02-05

## 2020-02-07 ENCOUNTER — HISTORICAL (OUTPATIENT)
Dept: PHYSICAL THERAPY | Facility: HOSPITAL | Age: 50
End: 2020-02-07

## 2020-02-12 ENCOUNTER — HISTORICAL (OUTPATIENT)
Dept: PHYSICAL THERAPY | Facility: HOSPITAL | Age: 50
End: 2020-02-12

## 2020-02-14 ENCOUNTER — HISTORICAL (OUTPATIENT)
Dept: PHYSICAL THERAPY | Facility: HOSPITAL | Age: 50
End: 2020-02-14

## 2020-02-17 ENCOUNTER — HISTORICAL (OUTPATIENT)
Dept: PHYSICAL THERAPY | Facility: HOSPITAL | Age: 50
End: 2020-02-17

## 2020-02-19 ENCOUNTER — HISTORICAL (OUTPATIENT)
Dept: PHYSICAL THERAPY | Facility: HOSPITAL | Age: 50
End: 2020-02-19

## 2020-02-21 ENCOUNTER — HISTORICAL (OUTPATIENT)
Dept: PHYSICAL THERAPY | Facility: HOSPITAL | Age: 50
End: 2020-02-21

## 2020-02-24 ENCOUNTER — HISTORICAL (OUTPATIENT)
Dept: PHYSICAL THERAPY | Facility: HOSPITAL | Age: 50
End: 2020-02-24

## 2020-02-26 ENCOUNTER — HISTORICAL (OUTPATIENT)
Dept: PHYSICAL THERAPY | Facility: HOSPITAL | Age: 50
End: 2020-02-26

## 2020-02-28 ENCOUNTER — HISTORICAL (OUTPATIENT)
Dept: PHYSICAL THERAPY | Facility: HOSPITAL | Age: 50
End: 2020-02-28

## 2020-03-10 ENCOUNTER — HISTORICAL (OUTPATIENT)
Dept: PHYSICAL THERAPY | Facility: HOSPITAL | Age: 50
End: 2020-03-10

## 2020-03-17 ENCOUNTER — HISTORICAL (OUTPATIENT)
Dept: PHYSICAL THERAPY | Facility: HOSPITAL | Age: 50
End: 2020-03-17

## 2020-03-19 ENCOUNTER — HISTORICAL (OUTPATIENT)
Dept: PHYSICAL THERAPY | Facility: HOSPITAL | Age: 50
End: 2020-03-19

## 2020-06-04 LAB
BILIRUB SERPL-MCNC: NORMAL MG/DL
BLOOD URINE, POC: NEGATIVE
GLUCOSE UR QL STRIP: NEGATIVE
KETONES UR QL STRIP: NORMAL
LEUKOCYTE EST, POC UA: NORMAL
NITRITE, POC UA: POSITIVE
PH, POC UA: 5
POC BETA-HCG (QUAL): NEGATIVE
PROTEIN, POC: NEGATIVE
SPECIFIC GRAVITY, POC UA: 1.01
UROBILINOGEN, POC UA: NORMAL

## 2020-06-07 LAB
HUMAN PAPILLOMAVIRUS (HPV): NORMAL
PAP RECOMMENDATION EXT: NORMAL
PAP SMEAR: NORMAL

## 2020-06-08 ENCOUNTER — HISTORICAL (OUTPATIENT)
Dept: LAB | Facility: HOSPITAL | Age: 50
End: 2020-06-08

## 2020-06-08 LAB
NEG CONT SPOT COUNT: NORMAL
PANEL A SPOT COUNT: 0
PANEL B SPOT COUNT: 0
POS CONT SPOT COUNT: NORMAL
T-SPOT.TB: NORMAL

## 2020-08-20 ENCOUNTER — HISTORICAL (OUTPATIENT)
Dept: LAB | Facility: HOSPITAL | Age: 50
End: 2020-08-20

## 2020-08-20 LAB
COLOR STL: NORMAL
COLOR STL: NORMAL
CONSISTENCY STL: NORMAL
CONSISTENCY STL: NORMAL
HEMOCCULT SP1 STL QL: NEGATIVE

## 2020-09-01 ENCOUNTER — HISTORICAL (OUTPATIENT)
Dept: LAB | Facility: HOSPITAL | Age: 50
End: 2020-09-01

## 2020-09-01 LAB
DEPRECATED CALCIDIOL+CALCIFEROL SERPL-MC: 30.7 NG/ML (ref 6.6–49.9)
TSH SERPL-ACNC: 1.35 MIU/ML (ref 0.36–3.74)

## 2021-03-10 ENCOUNTER — HISTORICAL (OUTPATIENT)
Dept: LAB | Facility: HOSPITAL | Age: 51
End: 2021-03-10

## 2021-03-10 LAB
ABS NEUT (OLG): 6.64 X10(3)/MCL (ref 2.1–9.2)
ALBUMIN SERPL-MCNC: 3 GM/DL (ref 3.5–5)
ALBUMIN/GLOB SERPL: 0.9 RATIO (ref 1.1–2)
ALP SERPL-CCNC: 76 UNIT/L (ref 40–150)
ALT SERPL-CCNC: 22 UNIT/L (ref 0–55)
AST SERPL-CCNC: 32 UNIT/L (ref 5–34)
BASOPHILS # BLD AUTO: 0 X10(3)/MCL (ref 0–0.2)
BASOPHILS NFR BLD AUTO: 0 %
BILIRUB SERPL-MCNC: 0.3 MG/DL
BILIRUBIN DIRECT+TOT PNL SERPL-MCNC: 0.1 MG/DL (ref 0–0.8)
BILIRUBIN DIRECT+TOT PNL SERPL-MCNC: 0.2 MG/DL (ref 0–0.5)
BUN SERPL-MCNC: 8.6 MG/DL (ref 9.8–20.1)
CALCIUM SERPL-MCNC: 8 MG/DL (ref 8.4–10.2)
CHLORIDE SERPL-SCNC: 102 MMOL/L (ref 98–107)
CHOLEST SERPL-MCNC: 155 MG/DL
CHOLEST/HDLC SERPL: 3 {RATIO} (ref 0–5)
CO2 SERPL-SCNC: 30 MMOL/L (ref 22–29)
CREAT SERPL-MCNC: 0.54 MG/DL (ref 0.55–1.02)
DEPRECATED CALCIDIOL+CALCIFEROL SERPL-MC: 32 NG/ML (ref 30–80)
EOSINOPHIL # BLD AUTO: 0.4 X10(3)/MCL (ref 0–0.9)
EOSINOPHIL NFR BLD AUTO: 3 %
ERYTHROCYTE [DISTWIDTH] IN BLOOD BY AUTOMATED COUNT: 17.8 % (ref 11.5–17)
EST. AVERAGE GLUCOSE BLD GHB EST-MCNC: 96.8 MG/DL
GLOBULIN SER-MCNC: 3.5 GM/DL (ref 2.4–3.5)
GLUCOSE SERPL-MCNC: 92 MG/DL (ref 74–100)
HBA1C MFR BLD: 5 %
HCT VFR BLD AUTO: 38.9 % (ref 37–47)
HDLC SERPL-MCNC: 49 MG/DL (ref 35–60)
HGB BLD-MCNC: 11.5 GM/DL (ref 12–16)
IMM GRANULOCYTES # BLD AUTO: 0.02 % (ref 0–0.02)
IMM GRANULOCYTES NFR BLD AUTO: 0.2 % (ref 0–0.43)
LDLC SERPL CALC-MCNC: 88 MG/DL (ref 50–140)
LYMPHOCYTES # BLD AUTO: 3.6 X10(3)/MCL (ref 0.6–4.6)
LYMPHOCYTES NFR BLD AUTO: 31 %
MCH RBC QN AUTO: 26.2 PG (ref 27–31)
MCHC RBC AUTO-ENTMCNC: 29.6 GM/DL (ref 33–36)
MCV RBC AUTO: 88.6 FL (ref 80–94)
MONOCYTES # BLD AUTO: 1 X10(3)/MCL (ref 0.1–1.3)
MONOCYTES NFR BLD AUTO: 8 %
NEUTROPHILS # BLD AUTO: 6.64 X10(3)/MCL (ref 1.4–7.9)
NEUTROPHILS NFR BLD AUTO: 57 %
PLATELET # BLD AUTO: 453 X10(3)/MCL (ref 130–400)
PMV BLD AUTO: 10.9 FL (ref 9.4–12.4)
POTASSIUM SERPL-SCNC: 2.7 MMOL/L (ref 3.5–5.1)
PROT SERPL-MCNC: 6.5 GM/DL (ref 6.4–8.3)
RBC # BLD AUTO: 4.39 X10(6)/MCL (ref 4.2–5.4)
SODIUM SERPL-SCNC: 142 MMOL/L (ref 136–145)
TRIGL SERPL-MCNC: 89 MG/DL (ref 37–140)
TSH SERPL-ACNC: 0.83 UIU/ML (ref 0.35–4.94)
VLDLC SERPL CALC-MCNC: 18 MG/DL
WBC # SPEC AUTO: 11.6 X10(3)/MCL (ref 4.5–11.5)

## 2021-03-22 ENCOUNTER — HISTORICAL (OUTPATIENT)
Dept: LAB | Facility: HOSPITAL | Age: 51
End: 2021-03-22

## 2021-03-22 LAB
ALBUMIN SERPL-MCNC: 3.4 GM/DL (ref 3.5–5)
ALBUMIN/GLOB SERPL: 1.4 RATIO (ref 1.1–2)
ALP SERPL-CCNC: 54 UNIT/L (ref 40–150)
ALT SERPL-CCNC: 35 UNIT/L (ref 0–55)
AST SERPL-CCNC: 31 UNIT/L (ref 5–34)
BILIRUB SERPL-MCNC: 0.5 MG/DL
BILIRUBIN DIRECT+TOT PNL SERPL-MCNC: 0.2 MG/DL (ref 0–0.5)
BILIRUBIN DIRECT+TOT PNL SERPL-MCNC: 0.3 MG/DL (ref 0–0.8)
BUN SERPL-MCNC: 8.9 MG/DL (ref 9.8–20.1)
CALCIUM SERPL-MCNC: 8 MG/DL (ref 8.4–10.2)
CHLORIDE SERPL-SCNC: 107 MMOL/L (ref 98–107)
CO2 SERPL-SCNC: 26 MMOL/L (ref 22–29)
CREAT SERPL-MCNC: 0.54 MG/DL (ref 0.55–1.02)
GLOBULIN SER-MCNC: 2.5 GM/DL (ref 2.4–3.5)
GLUCOSE SERPL-MCNC: 91 MG/DL (ref 74–100)
NEG CONT SPOT COUNT: NORMAL
PANEL A SPOT COUNT: 0
PANEL B SPOT COUNT: 0
POS CONT SPOT COUNT: NORMAL
POTASSIUM SERPL-SCNC: 4.6 MMOL/L (ref 3.5–5.1)
PROT SERPL-MCNC: 5.9 GM/DL (ref 6.4–8.3)
SODIUM SERPL-SCNC: 143 MMOL/L (ref 136–145)
T-SPOT.TB: NORMAL

## 2021-06-16 LAB — POC BETA-HCG (QUAL): NEGATIVE

## 2021-06-21 ENCOUNTER — HISTORICAL (OUTPATIENT)
Dept: RADIOLOGY | Facility: HOSPITAL | Age: 51
End: 2021-06-21

## 2021-08-16 ENCOUNTER — HISTORICAL (OUTPATIENT)
Dept: LAB | Facility: HOSPITAL | Age: 51
End: 2021-08-16

## 2021-08-16 LAB — HEMOCCULT SP2 STL QL: POSITIVE

## 2021-09-08 ENCOUNTER — HISTORICAL (OUTPATIENT)
Dept: RADIOLOGY | Facility: HOSPITAL | Age: 51
End: 2021-09-08

## 2021-09-08 LAB — BCS RECOMMENDATION EXT: NORMAL

## 2021-12-07 LAB — CRC RECOMMENDATION EXT: NORMAL

## 2022-02-10 ENCOUNTER — HISTORICAL (OUTPATIENT)
Dept: LAB | Facility: HOSPITAL | Age: 52
End: 2022-02-10

## 2022-02-23 LAB
NEG CONT SPOT COUNT: NORMAL
PANEL A SPOT COUNT: 0
PANEL B SPOT COUNT: 0
POS CONT SPOT COUNT: NORMAL
T-SPOT.TB: NEGATIVE

## 2022-03-16 ENCOUNTER — HISTORICAL (OUTPATIENT)
Dept: LAB | Facility: HOSPITAL | Age: 52
End: 2022-03-16

## 2022-03-16 LAB
ABS NEUT (OLG): 3.48 (ref 2.1–9.2)
ALBUMIN SERPL-MCNC: 3.6 G/DL (ref 3.5–5)
ALBUMIN/GLOB SERPL: 0.9 {RATIO} (ref 1.1–2)
ALP SERPL-CCNC: 86 U/L (ref 40–150)
ALT SERPL-CCNC: 13 U/L (ref 0–55)
AST SERPL-CCNC: 18 U/L (ref 5–34)
BASOPHILS # BLD AUTO: 0.1 10*3/UL (ref 0–0.2)
BASOPHILS NFR BLD AUTO: 1 %
BILIRUB SERPL-MCNC: 0.4 MG/DL
BILIRUBIN DIRECT+TOT PNL SERPL-MCNC: 0.2 (ref 0–0.5)
BILIRUBIN DIRECT+TOT PNL SERPL-MCNC: 0.2 (ref 0–0.8)
BUN SERPL-MCNC: 11.8 MG/DL (ref 9.8–20.1)
CALCIUM SERPL-MCNC: 8.9 MG/DL (ref 8.7–10.5)
CHLORIDE SERPL-SCNC: 107 MMOL/L (ref 98–107)
CHOLEST SERPL-MCNC: 196 MG/DL
CHOLEST/HDLC SERPL: 3 {RATIO} (ref 0–5)
CO2 SERPL-SCNC: 27 MMOL/L (ref 22–29)
CREAT SERPL-MCNC: 0.73 MG/DL (ref 0.55–1.02)
DEPRECATED CALCIDIOL+CALCIFEROL SERPL-MC: 27 NG/ML (ref 30–80)
EOSINOPHIL # BLD AUTO: 0.3 10*3/UL (ref 0–0.9)
EOSINOPHIL NFR BLD AUTO: 4 %
ERYTHROCYTE [DISTWIDTH] IN BLOOD BY AUTOMATED COUNT: 13.1 % (ref 11.5–17)
GLOBULIN SER-MCNC: 3.9 G/DL (ref 2.4–3.5)
GLUCOSE SERPL-MCNC: 122 MG/DL (ref 74–100)
HCT VFR BLD AUTO: 42 % (ref 37–47)
HDLC SERPL-MCNC: 67 MG/DL (ref 35–60)
HEMOLYSIS INTERF INDEX SERPL-ACNC: 3
HGB BLD-MCNC: 13.1 G/DL (ref 12–16)
ICTERIC INTERF INDEX SERPL-ACNC: 0
LDLC SERPL CALC-MCNC: 115 MG/DL (ref 50–140)
LIPEMIC INTERF INDEX SERPL-ACNC: 18
LYMPHOCYTES # BLD AUTO: 2.7 10*3/UL (ref 0.6–4.6)
LYMPHOCYTES NFR BLD AUTO: 38 %
MANUAL DIFF? (OHS): NO
MCH RBC QN AUTO: 29 PG (ref 27–31)
MCHC RBC AUTO-ENTMCNC: 31.2 G/DL (ref 33–36)
MCV RBC AUTO: 92.9 FL (ref 80–94)
MONOCYTES # BLD AUTO: 0.5 10*3/UL (ref 0.1–1.3)
MONOCYTES NFR BLD AUTO: 8 %
NEUTROPHILS # BLD AUTO: 3.48 10*3/UL (ref 1.4–7.9)
NEUTROPHILS NFR BLD AUTO: 49 %
PLATELET # BLD AUTO: 384 10*3/UL (ref 130–400)
PMV BLD AUTO: 10.3 FL (ref 9.4–12.4)
POTASSIUM SERPL-SCNC: 4.2 MMOL/L (ref 3.5–5.1)
PROT SERPL-MCNC: 7.5 G/DL (ref 6.4–8.3)
RBC # BLD AUTO: 4.52 10*6/UL (ref 4.2–5.4)
SODIUM SERPL-SCNC: 143 MMOL/L (ref 136–145)
TRIGL SERPL-MCNC: 71 MG/DL (ref 37–140)
TSH SERPL-ACNC: 0.8 M[IU]/L (ref 0.35–4.94)
VLDLC SERPL CALC-MCNC: 14 MG/DL
WBC # SPEC AUTO: 7.1 10*3/UL (ref 4.5–11.5)

## 2022-03-18 ENCOUNTER — HISTORICAL (OUTPATIENT)
Dept: ADMINISTRATIVE | Facility: HOSPITAL | Age: 52
End: 2022-03-18

## 2022-03-24 ENCOUNTER — HISTORICAL (OUTPATIENT)
Dept: RADIOLOGY | Facility: HOSPITAL | Age: 52
End: 2022-03-24

## 2022-03-24 ENCOUNTER — HISTORICAL (OUTPATIENT)
Dept: ADMINISTRATIVE | Facility: HOSPITAL | Age: 52
End: 2022-03-24

## 2022-04-11 ENCOUNTER — HISTORICAL (OUTPATIENT)
Dept: ADMINISTRATIVE | Facility: HOSPITAL | Age: 52
End: 2022-04-11
Payer: MEDICAID

## 2022-04-28 VITALS
HEIGHT: 63 IN | SYSTOLIC BLOOD PRESSURE: 127 MMHG | DIASTOLIC BLOOD PRESSURE: 83 MMHG | OXYGEN SATURATION: 97 % | WEIGHT: 186.06 LBS | BODY MASS INDEX: 32.97 KG/M2

## 2022-04-30 NOTE — OP NOTE
Patient:   Lilian Arroyo            MRN: [id number]            FIN: [id number]               Age:   48 years     Sex:  Female     :  1970   Associated Diagnoses:   Hidradenitis suppurativa; Hidradenitis axillaris   Author:   Cherelle Tovar MD      Operative Note   Operative Information   Date/ Time:  2018 19:46:00.     Procedures Performed: Procedure Code   Excision Hydradenitis (Right) on 2018 at 48 Years.  Comments:  2018 12:22 - Mariah BLAKE, Elda GRAHAM  auto-populated from documented surgical case.     Indications: 48-year-old female with chronic hidradenitis suppertiva of the bilateral legs axilla and groins with chronically inflamed right axilla elected to undergo excision of chronically inflamed tissues and sinus.     Preoperative Diagnosis: Hidradenitis suppurativa (ZCU98-GB L73.2), Hidradenitis axillaris (MMC88-PT L73.2).     Postoperative Diagnosis: Hidradenitis suppurativa (INO70-ON L73.2), Hidradenitis axillaris (LZH54-YV L73.2).     Surgeon: Cherelle Tovar MD.     Anesthesia: Gen..     Speciman Removed: right axillary tissues with centralized chronic inflammation and sinus measuring 4 x 2 cm in dimension proximally 8 cm² in total.     Description of Procedure/Findings/    Complications: patient was brought to the operative theater laid in the supine position. General endotracheal intubation anesthesia was provided. There the right axilla was sterilely prepped and draped in normal surgical fashion using chlorhexidine. Preoperative antibiotics were administered. There the right axilla was identified the sinus cavity along with inflamed tissues were examined and demarcated with a pen. This region was then infiltrated 1% lidocaine and epinephrine. #15 blade was used to incise the skin down through the subcutaneous tissues and fat in elliptical fashion measuring about 4 x 2 cm in dimension.  Dissection was carried down to the subcutaneous tissues and fat encompassing all  effective and chronically inflamed tissues identified.  The cavity was made hemostatic using Bovie cauterization. The wound was then reapproximated in a layered fashion using 3-0 Vicryl and running 4-0 subcuticular Monocryl. The patient was then relieved of anesthesia stable condition and transferred to postanesthesia care unit after a sterile dressing was applied..     Esimated blood loss: loss  1  cc.     Complications: None.

## 2022-04-30 NOTE — PROGRESS NOTES
Patient:   Lilian Arroyo            MRN: [id number]            FIN: [id number]               Age:   50 years     Sex:  Female     :  1970   Associated Diagnoses:   None   Author:   Dickson Ayala MD      spoke w pt today on phone about her bloodwork she has no complaints she denies breast pain pelvic pain and states it has been two months since no period. she denies hot flashes.  she understands that if she develop bleeding after a whole year of no bleeding she has to let me know asap. time 10-12 min taken for Q&A

## 2022-05-17 ENCOUNTER — OFFICE VISIT (OUTPATIENT)
Dept: FAMILY MEDICINE | Facility: CLINIC | Age: 52
End: 2022-05-17
Payer: MEDICAID

## 2022-05-17 VITALS
BODY MASS INDEX: 35.37 KG/M2 | HEIGHT: 62 IN | SYSTOLIC BLOOD PRESSURE: 105 MMHG | WEIGHT: 192.19 LBS | HEART RATE: 80 BPM | DIASTOLIC BLOOD PRESSURE: 71 MMHG

## 2022-05-17 DIAGNOSIS — M25.462 PAIN AND SWELLING OF LEFT KNEE: Primary | ICD-10-CM

## 2022-05-17 DIAGNOSIS — M25.562 PAIN AND SWELLING OF LEFT KNEE: Primary | ICD-10-CM

## 2022-05-17 PROCEDURE — 3008F PR BODY MASS INDEX (BMI) DOCUMENTED: ICD-10-PCS | Mod: CPTII,,, | Performed by: NURSE PRACTITIONER

## 2022-05-17 PROCEDURE — 1159F PR MEDICATION LIST DOCUMENTED IN MEDICAL RECORD: ICD-10-PCS | Mod: CPTII,,, | Performed by: NURSE PRACTITIONER

## 2022-05-17 PROCEDURE — 1160F RVW MEDS BY RX/DR IN RCRD: CPT | Mod: CPTII,,, | Performed by: NURSE PRACTITIONER

## 2022-05-17 PROCEDURE — 3078F PR MOST RECENT DIASTOLIC BLOOD PRESSURE < 80 MM HG: ICD-10-PCS | Mod: CPTII,,, | Performed by: NURSE PRACTITIONER

## 2022-05-17 PROCEDURE — 3008F BODY MASS INDEX DOCD: CPT | Mod: CPTII,,, | Performed by: NURSE PRACTITIONER

## 2022-05-17 PROCEDURE — 3078F DIAST BP <80 MM HG: CPT | Mod: CPTII,,, | Performed by: NURSE PRACTITIONER

## 2022-05-17 PROCEDURE — 1159F MED LIST DOCD IN RCRD: CPT | Mod: CPTII,,, | Performed by: NURSE PRACTITIONER

## 2022-05-17 PROCEDURE — 99213 PR OFFICE/OUTPT VISIT, EST, LEVL III, 20-29 MIN: ICD-10-PCS | Mod: ,,, | Performed by: NURSE PRACTITIONER

## 2022-05-17 PROCEDURE — 3074F PR MOST RECENT SYSTOLIC BLOOD PRESSURE < 130 MM HG: ICD-10-PCS | Mod: CPTII,,, | Performed by: NURSE PRACTITIONER

## 2022-05-17 PROCEDURE — 3074F SYST BP LT 130 MM HG: CPT | Mod: CPTII,,, | Performed by: NURSE PRACTITIONER

## 2022-05-17 PROCEDURE — 99213 OFFICE O/P EST LOW 20 MIN: CPT | Mod: ,,, | Performed by: NURSE PRACTITIONER

## 2022-05-17 PROCEDURE — 1160F PR REVIEW ALL MEDS BY PRESCRIBER/CLIN PHARMACIST DOCUMENTED: ICD-10-PCS | Mod: CPTII,,, | Performed by: NURSE PRACTITIONER

## 2022-05-17 RX ORDER — KETOROLAC TROMETHAMINE 30 MG/ML
60 INJECTION, SOLUTION INTRAMUSCULAR; INTRAVENOUS
Status: COMPLETED | OUTPATIENT
Start: 2022-05-17 | End: 2022-05-19

## 2022-05-17 NOTE — PROGRESS NOTES
"Subjective:       Patient ID: Lilian Arroyo is a 51 y.o. female.    Chief Complaint: Knee Pain (Left knee pain x 3 days-no injury)      HPI   This is a 51-year-old  female presents at times today complaining of left knee pain.  Patient states that her left knee started her in approximately 3-4 days ago.  No known injury.  Denies any numbness and tingling down the extremity.  Patient rates pain at 7 out    Review of Systems   Constitutional: Negative for fatigue and fever.   HENT: Negative.    Eyes: Negative.    Respiratory: Negative.  Negative for shortness of breath and wheezing.    Cardiovascular: Negative.    Gastrointestinal: Negative.    Endocrine: Negative.    Genitourinary: Negative.    Musculoskeletal: Positive for joint swelling.   Integumentary:  Negative.   Allergic/Immunologic: Negative.    Neurological: Negative for dizziness, weakness and headaches.   Hematological: Negative.    Psychiatric/Behavioral: Negative.  Negative for suicidal ideas.   All other systems reviewed and are negative.           The patients  hx, allergies, medication and and problem list were updated as appropriate.    Objective:       /71   Pulse 80   Ht 5' 2" (1.575 m)   Wt 87.2 kg (192 lb 3.2 oz)   BMI 35.15 kg/m²      Physical Exam  Constitutional:       Appearance: Normal appearance.   HENT:      Head: Normocephalic.   Eyes:      Pupils: Pupils are equal, round, and reactive to light.   Cardiovascular:      Rate and Rhythm: Normal rate.      Pulses: Normal pulses.   Pulmonary:      Effort: Pulmonary effort is normal.   Abdominal:      General: Bowel sounds are normal.      Palpations: Abdomen is soft.   Musculoskeletal:         General: Normal range of motion.      Cervical back: Neck supple.      Left knee: Swelling present.   Skin:     General: Skin is warm and dry.   Neurological:      Mental Status: She is alert and oriented to person, place, and time.   Psychiatric:         Mood and Affect: Mood normal. "         Behavior: Behavior normal.         Judgment: Judgment normal.           Assessment/Plan   1. Pain and swelling of left knee  -     ketorolac injection 60 mg    Rest, ice, alternate with heat, compression and elevation.  Patient instructed to use heat therapy follow-up in 2-3 weeks of normal.   If no improvement consider PPN next     No results found for this or any previous visit (from the past 24 hour(s)).   No follow-ups on file.

## 2022-05-19 RX ADMIN — KETOROLAC TROMETHAMINE 60 MG: 30 INJECTION, SOLUTION INTRAMUSCULAR; INTRAVENOUS at 07:05

## 2022-05-23 ENCOUNTER — TELEPHONE (OUTPATIENT)
Dept: FAMILY MEDICINE | Facility: CLINIC | Age: 52
End: 2022-05-23
Payer: MEDICAID

## 2022-05-23 ENCOUNTER — HOSPITAL ENCOUNTER (EMERGENCY)
Facility: HOSPITAL | Age: 52
Discharge: HOME | End: 2022-05-23
Attending: SPECIALIST
Payer: MEDICAID

## 2022-05-23 VITALS
OXYGEN SATURATION: 96 % | DIASTOLIC BLOOD PRESSURE: 74 MMHG | WEIGHT: 180 LBS | HEART RATE: 97 BPM | SYSTOLIC BLOOD PRESSURE: 106 MMHG | BODY MASS INDEX: 31.89 KG/M2 | RESPIRATION RATE: 20 BRPM | HEIGHT: 63 IN | TEMPERATURE: 98 F

## 2022-05-23 DIAGNOSIS — M25.562 LEFT KNEE PAIN, UNSPECIFIED CHRONICITY: Primary | ICD-10-CM

## 2022-05-23 PROCEDURE — 99283 EMERGENCY DEPT VISIT LOW MDM: CPT

## 2022-05-23 PROCEDURE — 63600175 PHARM REV CODE 636 W HCPCS: Performed by: SPECIALIST

## 2022-05-23 RX ORDER — METHYLPREDNISOLONE 4 MG/1
TABLET ORAL
Qty: 1 EACH | Refills: 0 | Status: SHIPPED | OUTPATIENT
Start: 2022-05-23 | End: 2022-07-12 | Stop reason: ALTCHOICE

## 2022-05-23 RX ORDER — PREDNISONE 20 MG/1
40 TABLET ORAL
Status: COMPLETED | OUTPATIENT
Start: 2022-05-23 | End: 2022-05-23

## 2022-05-23 RX ADMIN — PREDNISONE 40 MG: 20 TABLET ORAL at 09:05

## 2022-05-23 NOTE — TELEPHONE ENCOUNTER
"Spoke with pt- knee remains painful- "Whats next"   Also needs new glucometer for bs testing. rl   "

## 2022-05-23 NOTE — TELEPHONE ENCOUNTER
----- Message from Lety Plummer sent at 5/23/2022  4:29 PM CDT -----  Regarding: Call back  Type:  Needs Medical Advice    Who Called: Pt's   Symptoms (please be specific): NA  How long has patient had these symptoms:  NA  Pharmacy name and phone #:  NA  Would the patient rather a call back or a response via [facility]? Call back  Best Call Back Number: [phone]  Additional Information: Wants to speak to someone in clinic about prev appt..

## 2022-05-24 NOTE — ED PROVIDER NOTES
"Encounter Date: 5/23/2022       History     Chief Complaint   Patient presents with    Knee Pain     Left knee pain x2 weeks. Went to PCP last week and reports she received a shot for inflammation, but it did not help. Patient denies fall or injury     She recently saw her primary care MD and had an "injection" but this did not help her knee pain    The history is provided by the patient.   Knee Pain  This is a chronic problem. The current episode started more than 1 week ago. The problem occurs constantly. The problem has not changed since onset.Pertinent negatives include no chest pain, no abdominal pain, no headaches and no shortness of breath. The symptoms are aggravated by standing. Nothing relieves the symptoms.     Review of patient's allergies indicates:   Allergen Reactions    Pcn [penicillins]      Past Medical History:   Diagnosis Date    Asthma     GERD (gastroesophageal reflux disease)     Hidradenitis axillaris     bilateral, had surgery on right    Insulin resistance      Past Surgical History:   Procedure Laterality Date    AXILLARY HIDRADENITIS EXCISION  in 10/2018    Dr. Payan in [facility], right side, wound dehisced    CHOLECYSTECTOMY      LAPAROSCOPIC GASTRIC BANDING  2006    [place], [place]    TONSILLECTOMY       History reviewed. No pertinent family history.  Social History     Tobacco Use    Smoking status: Former Smoker    Smokeless tobacco: Former User     Review of Systems   Constitutional: Negative.  Negative for chills, fatigue and fever.   HENT: Negative.    Respiratory: Negative for cough and shortness of breath.    Cardiovascular: Negative for chest pain and palpitations.   Gastrointestinal: Negative for abdominal distention and abdominal pain.   Musculoskeletal: Positive for arthralgias.   Skin: Negative.    Neurological: Negative for dizziness, light-headedness and headaches.   All other systems reviewed and are negative.      Physical Exam     Initial Vitals " [05/23/22 2045]   BP Pulse Resp Temp SpO2   106/74 97 20 98 °F (36.7 °C) 96 %      MAP       --         Physical Exam    Nursing note and vitals reviewed.  Constitutional: She appears well-developed and well-nourished.   HENT:   Head: Normocephalic and atraumatic.   Eyes: EOM are normal. Pupils are equal, round, and reactive to light.   Neck: Neck supple.   Normal range of motion.  Cardiovascular: Normal rate, regular rhythm, normal heart sounds and intact distal pulses.   Pulmonary/Chest: Breath sounds normal.   Abdominal: Abdomen is soft. Bowel sounds are normal.   Musculoskeletal:         General: Normal range of motion.      Cervical back: Normal range of motion and neck supple.      Comments: Left knee with good ROM, no swelling or effusions     Neurological: She is alert and oriented to person, place, and time. She has normal strength.   Skin: Skin is warm and dry.         ED Course   Procedures  Labs Reviewed - No data to display       Imaging Results    None          Medications   predniSONE tablet 40 mg (40 mg Oral Given 5/23/22 2158)                          Clinical Impression:   Final diagnoses:  [M25.562] Left knee pain, unspecified chronicity (Primary)          ED Disposition Condition    Discharge Stable        ED Prescriptions     Medication Sig Dispense Start Date End Date Auth. Provider    methylPREDNISolone (MEDROL DOSEPACK) 4 mg tablet Take as directed 1 each 5/23/2022  Avel Chino MD        Follow-up Information     Follow up With Specialties Details Why Contact Info    Ritesh Banerjee MD Orthopedic Surgery  As needed [address] [place] [address]  [phone]             Avel Chino MD  05/23/22 9397

## 2022-05-24 NOTE — ED NOTES
Pt states she went to her doctor last week in regards to left knee pain, pt describes the pain as constant and throbbing, pt denies injury. Pt states the pain even keeps her up during the night. Pt rates her current pain as a 9/10. No swelling noted or bruising noted. ROM good.

## 2022-05-26 ENCOUNTER — HOSPITAL ENCOUNTER (EMERGENCY)
Facility: HOSPITAL | Age: 52
Discharge: HOME | End: 2022-05-26
Attending: STUDENT IN AN ORGANIZED HEALTH CARE EDUCATION/TRAINING PROGRAM
Payer: MEDICAID

## 2022-05-26 VITALS
TEMPERATURE: 99 F | DIASTOLIC BLOOD PRESSURE: 88 MMHG | BODY MASS INDEX: 33.49 KG/M2 | OXYGEN SATURATION: 98 % | HEART RATE: 80 BPM | WEIGHT: 189 LBS | HEIGHT: 63 IN | RESPIRATION RATE: 18 BRPM | SYSTOLIC BLOOD PRESSURE: 113 MMHG

## 2022-05-26 DIAGNOSIS — B34.9 ACUTE VIRAL SYNDROME: Primary | ICD-10-CM

## 2022-05-26 LAB — SARS-COV-2 RDRP RESP QL NAA+PROBE: NEGATIVE

## 2022-05-26 PROCEDURE — 99283 EMERGENCY DEPT VISIT LOW MDM: CPT | Mod: 25

## 2022-05-26 PROCEDURE — 87635 SARS-COV-2 COVID-19 AMP PRB: CPT | Performed by: NURSE PRACTITIONER

## 2022-05-26 NOTE — Clinical Note
"Elizabeth Gallegos" Dian was seen and treated in our emergency department on 5/26/2022.  She may return to work on 05/30/2022.       If you have any questions or concerns, please don't hesitate to call.      SARAI Kumari"

## 2022-05-27 NOTE — DISCHARGE INSTRUCTIONS
Alternate Tylenol and Motrin every 3 hours as needed for pain/fever  Delsym or Mucinex over-the-counter as needed for cough  Given your symptoms and close positive exposure to self isolate for 5 days

## 2022-05-27 NOTE — ED PROVIDER NOTES
Encounter Date: 5/26/2022       History     Chief Complaint   Patient presents with    Cough    Headache     Cough, headache, runny nose and diarrhea x2 days. Family member pos covid     52-year-old female presents to ER complaining of nasal congestion, with cough and diarrhea started 2 days ago.  She denies any fever, vomiting shortness of breath.  Daughter in ER earlier and tested positive for COVID.    The history is provided by the patient. No  was used.     Review of patient's allergies indicates:   Allergen Reactions    Pcn [penicillins]      No past medical history on file.  No past surgical history on file.  No family history on file.     Review of Systems   Constitutional: Negative for chills and fever.   HENT: Positive for congestion and rhinorrhea.    Respiratory: Positive for cough. Negative for shortness of breath.    Gastrointestinal: Positive for diarrhea. Negative for abdominal pain and vomiting.   Musculoskeletal: Negative for myalgias.   Skin: Negative for rash.   Neurological: Positive for headaches.       Physical Exam     Initial Vitals [05/26/22 1913]   BP Pulse Resp Temp SpO2   122/77 107 18 98.5 °F (36.9 °C) 99 %      MAP       --         Physical Exam    Constitutional: He appears well-developed and well-nourished.   HENT:   Head: Normocephalic.   Eyes: EOM are normal.   Neck: Neck supple.   Normal range of motion.  Cardiovascular: Normal rate, regular rhythm and normal heart sounds.   Pulmonary/Chest: Breath sounds normal. No respiratory distress.   Abdominal: Abdomen is soft. There is no abdominal tenderness.   Musculoskeletal:         General: Normal range of motion.      Cervical back: Normal range of motion and neck supple.     Neurological: He is alert and oriented to person, place, and time. GCS score is 15. GCS eye subscore is 4. GCS verbal subscore is 5. GCS motor subscore is 6.   Skin: Skin is warm and dry.   Psychiatric: He has a normal mood and affect.          ED Course   Procedures  Labs Reviewed   SARS-COV-2 RNA AMPLIFICATION, QUAL - Normal          Imaging Results    None          Medications - No data to display  Medical Decision Making:   Differential Diagnosis:   COVID, viral syndrom  Clinical Tests:   Lab Tests: Ordered and Reviewed       <> Summary of Lab: COVID negative.   ED Management:  Patient's COVID is negative.  Two family members have tested positive and patient is symptomatic.  Will have her self isolate as well with symptomatic treatments.                      Clinical Impression:   Final diagnoses:  [B34.9] Acute viral syndrome (Primary)          ED Disposition Condition    Discharge Stable        ED Prescriptions     None        Follow-up Information    None          Maryann Finley, SARAI  05/26/22 1951

## 2022-07-08 ENCOUNTER — TELEPHONE (OUTPATIENT)
Dept: ADMINISTRATIVE | Facility: HOSPITAL | Age: 52
End: 2022-07-08
Payer: MEDICAID

## 2022-07-08 NOTE — TELEPHONE ENCOUNTER
Type:  RX Refill Request    Who Called: self  Refill or New Rx:refill  RX Name and Strength:true metrix glucose test strip  How is the patient currently taking it? (ex. 1XDay):jason  Is this a 30 day or 90 day RX:jason  Preferred Pharmacy with phone number:[facility]  Local or Mail Order:local  Ordering Provider:Sarah  Would the patient rather a call back or a response via [facility]? Call back  Best Call Back Number:[phone]  Additional Information: Pt requesting a new meter as well

## 2022-07-11 ENCOUNTER — LAB VISIT (OUTPATIENT)
Dept: LAB | Facility: HOSPITAL | Age: 52
End: 2022-07-11
Attending: NURSE PRACTITIONER
Payer: MEDICAID

## 2022-07-11 ENCOUNTER — TELEPHONE (OUTPATIENT)
Dept: FAMILY MEDICINE | Facility: CLINIC | Age: 52
End: 2022-07-11
Payer: MEDICAID

## 2022-07-11 DIAGNOSIS — E16.2 LOW BLOOD SUGAR READING: ICD-10-CM

## 2022-07-11 DIAGNOSIS — E16.2 LOW BLOOD SUGAR READING: Primary | ICD-10-CM

## 2022-07-11 LAB
ALBUMIN SERPL-MCNC: 3.9 GM/DL (ref 3.5–5)
ALBUMIN/GLOB SERPL: 1.1 RATIO (ref 1.1–2)
ALP SERPL-CCNC: 90 UNIT/L (ref 40–150)
ALT SERPL-CCNC: 18 UNIT/L (ref 0–55)
AST SERPL-CCNC: 19 UNIT/L (ref 5–34)
BILIRUBIN DIRECT+TOT PNL SERPL-MCNC: 0.5 MG/DL
BUN SERPL-MCNC: 16.6 MG/DL (ref 9.8–20.1)
CALCIUM SERPL-MCNC: 9.2 MG/DL (ref 8.4–10.2)
CHLORIDE SERPL-SCNC: 102 MMOL/L (ref 98–107)
CO2 SERPL-SCNC: 27 MMOL/L (ref 22–29)
CREAT SERPL-MCNC: 0.65 MG/DL (ref 0.55–1.02)
EST. AVERAGE GLUCOSE BLD GHB EST-MCNC: 102.5 MG/DL
GLOBULIN SER-MCNC: 3.5 GM/DL (ref 2.4–3.5)
GLUCOSE SERPL-MCNC: 98 MG/DL (ref 74–100)
HBA1C MFR BLD: 5.2 %
POTASSIUM SERPL-SCNC: 4.2 MMOL/L (ref 3.5–5.1)
PROT SERPL-MCNC: 7.4 GM/DL (ref 6.4–8.3)
SODIUM SERPL-SCNC: 137 MMOL/L (ref 136–145)

## 2022-07-11 PROCEDURE — 36415 COLL VENOUS BLD VENIPUNCTURE: CPT

## 2022-07-11 PROCEDURE — 83036 HEMOGLOBIN GLYCOSYLATED A1C: CPT

## 2022-07-11 PROCEDURE — 80053 COMPREHEN METABOLIC PANEL: CPT

## 2022-07-11 NOTE — TELEPHONE ENCOUNTER
Pt states her blood sugar has dropped lately-not often but in 50-80 with weakness- will go foor lab and appt oziel. rl

## 2022-07-12 ENCOUNTER — OFFICE VISIT (OUTPATIENT)
Dept: FAMILY MEDICINE | Facility: CLINIC | Age: 52
End: 2022-07-12
Payer: MEDICAID

## 2022-07-12 VITALS
BODY MASS INDEX: 34.48 KG/M2 | HEART RATE: 89 BPM | DIASTOLIC BLOOD PRESSURE: 87 MMHG | SYSTOLIC BLOOD PRESSURE: 123 MMHG | WEIGHT: 194.63 LBS | HEIGHT: 63 IN

## 2022-07-12 DIAGNOSIS — E16.2 LOW BLOOD SUGAR READING: ICD-10-CM

## 2022-07-12 DIAGNOSIS — Z86.39 H/O DIABETES MELLITUS: Primary | ICD-10-CM

## 2022-07-12 DIAGNOSIS — M25.562 LEFT KNEE PAIN, UNSPECIFIED CHRONICITY: ICD-10-CM

## 2022-07-12 PROCEDURE — 3079F DIAST BP 80-89 MM HG: CPT | Mod: CPTII,,, | Performed by: NURSE PRACTITIONER

## 2022-07-12 PROCEDURE — 3008F BODY MASS INDEX DOCD: CPT | Mod: CPTII,,, | Performed by: NURSE PRACTITIONER

## 2022-07-12 PROCEDURE — 3074F PR MOST RECENT SYSTOLIC BLOOD PRESSURE < 130 MM HG: ICD-10-PCS | Mod: CPTII,,, | Performed by: NURSE PRACTITIONER

## 2022-07-12 PROCEDURE — 3074F SYST BP LT 130 MM HG: CPT | Mod: CPTII,,, | Performed by: NURSE PRACTITIONER

## 2022-07-12 PROCEDURE — 99213 OFFICE O/P EST LOW 20 MIN: CPT | Mod: ,,, | Performed by: NURSE PRACTITIONER

## 2022-07-12 PROCEDURE — 3079F PR MOST RECENT DIASTOLIC BLOOD PRESSURE 80-89 MM HG: ICD-10-PCS | Mod: CPTII,,, | Performed by: NURSE PRACTITIONER

## 2022-07-12 PROCEDURE — 3008F PR BODY MASS INDEX (BMI) DOCUMENTED: ICD-10-PCS | Mod: CPTII,,, | Performed by: NURSE PRACTITIONER

## 2022-07-12 PROCEDURE — 1159F PR MEDICATION LIST DOCUMENTED IN MEDICAL RECORD: ICD-10-PCS | Mod: CPTII,,, | Performed by: NURSE PRACTITIONER

## 2022-07-12 PROCEDURE — 99213 PR OFFICE/OUTPT VISIT, EST, LEVL III, 20-29 MIN: ICD-10-PCS | Mod: ,,, | Performed by: NURSE PRACTITIONER

## 2022-07-12 PROCEDURE — 1159F MED LIST DOCD IN RCRD: CPT | Mod: CPTII,,, | Performed by: NURSE PRACTITIONER

## 2022-07-12 RX ORDER — ERGOCALCIFEROL 1.25 MG/1
50000 CAPSULE ORAL
COMMUNITY
End: 2024-03-11 | Stop reason: ALTCHOICE

## 2022-07-12 RX ORDER — LIDOCAINE 50 MG/G
1 PATCH TOPICAL DAILY
Qty: 5 PATCH | Refills: 0 | Status: SHIPPED | OUTPATIENT
Start: 2022-07-12 | End: 2022-07-22

## 2022-07-12 RX ORDER — ADALIMUMAB 40MG/0.4ML
40 KIT SUBCUTANEOUS WEEKLY
COMMUNITY
Start: 2022-06-21 | End: 2022-09-07

## 2022-07-12 RX ORDER — INSULIN PUMP SYRINGE, 3 ML
EACH MISCELLANEOUS
Qty: 1 EACH | Refills: 0 | Status: SHIPPED | OUTPATIENT
Start: 2022-07-12 | End: 2024-03-11

## 2022-07-12 RX ORDER — LANCETS
EACH MISCELLANEOUS
Qty: 1 EACH | Refills: 0 | Status: SHIPPED | OUTPATIENT
Start: 2022-07-12 | End: 2022-09-07

## 2022-07-12 RX ORDER — MULTIVITAMIN
1 TABLET ORAL DAILY
COMMUNITY
End: 2022-09-07 | Stop reason: SDUPTHER

## 2022-07-12 NOTE — PROGRESS NOTES
SUBJECTIVE:     History of Present Illness      Chief Complaint: Follow-up (Blood sugar # decreasing )    HPI:  Patient is a 51 y.o. year old female who presents to clinic for   Low blood sugars.  Patient has a history of diabetes was taking metformin prior to her bariatric surgery about 2 years ago.  Since surgery with weight loss and diet modifications patient has been off of metformin.  Last A1c 5.2.  She states a few days over the last few weeks she has had some low blood sugars between 40-60.       Patient also concerned she states she went to [facility]gent [facility] improved bree[facility] maybe 3-4 weeks ago and was told she has some arthritis in her left knee.  States that her knee is still bothering her.    Review of Systems:  General: Denies fever, chills, fatigue, myalgias, and change in appetite   Eyes: Denies change in vision, eye redness, eye drainage, eye pain  ENT: Denies ear pain or pressure, rhinorrhea, nasal congestion, sore throat, and trouble swallowing  Resp: Denies wheezing, and shortness of breath   Cardio: Denies chest pain, palpitations, pleuritic pain, and edema   GI: Denies nausea, vomiting, diarrhea, and abdominal pain   : Denies dysuria, hematuria, and discharge   MSK: Denies trauma, joint pain, and trouble ambulating left knee pain  Neuro: Denies LOC, dizziness, seizure like activity, and focal deficits   Skin: Denies rashes, open lesions and ulcers      Previous History      Review of patient's allergies indicates:   Allergen Reactions    Pcn [penicillins]        Past Medical History:   Diagnosis Date    Asthma     GERD (gastroesophageal reflux disease)     Hidradenitis axillaris     bilateral, had surgery on right    Insulin resistance      Current Outpatient Medications   Medication Instructions    albuterol (PROVENTIL/VENTOLIN HFA) 90 mcg/actuation inhaler inhale1 puff by mouth 4 times per day as needed for wheezing    blood sugar diagnostic Strp To check BG 2  times daily, to use with  "insurance preferred meter    blood-glucose meter kit To check BG 2 times daily, to use with insurance preferred meter    docusate sodium (COLACE) 100 MG capsule TAKE ONE CAPSULE BY MOUTH TWICE DAILY as needed for FOR CONSTIPATION    ergocalciferol (ERGOCALCIFEROL) 50,000 Units, Oral, Every 7 days    HUMIRA(CF) PEN 40 mg, Subcutaneous, Weekly    lancets Misc To check BG 2 times daily, to use with insurance preferred meter    LIDOcaine (LIDODERM) 5 % 1 patch, Transdermal, Daily, Remove & Discard patch within 12 hours or as directed by MD    meclizine (ANTIVERT) 25 mg tablet TAKE ONE TABLET BY MOUTH THREE TIMES DAILY as needed for dizziness    multivitamin (THERAGRAN) per tablet 1 tablet, Oral, Daily    omeprazole (PRILOSEC) 40 mg, Oral    triamcinolone acetonide 0.025% (KENALOG) 0.025 % cream apply topically to the affected area 4 times per day as needed for rash for 14 days    TRUE METRIX GLUCOSE TEST STRIP Strp test once daily     Past Surgical History:   Procedure Laterality Date    AXILLARY HIDRADENITIS EXCISION  in 10/2018    Dr. Payan in [facility], right side, wound dehisced    CHOLECYSTECTOMY      LAPAROSCOPIC GASTRIC BANDING  2006    [place], [place]    TONSILLECTOMY       History reviewed. No pertinent family history.    Social History     Tobacco Use    Smoking status: Former Smoker    Smokeless tobacco: Former User        Health Maintenance      Health Maintenance   Topic Date Due    TETANUS VACCINE  Never done    Mammogram  Never done    Lipid Panel  03/16/2027    Hepatitis C Screening  Completed       OBJECTIVE:     Physical Exam      Vital Signs Reviewed   /87   Pulse 89   Ht 5' 3" (1.6 m)   Wt 88.3 kg (194 lb 9.6 oz)   BMI 34.47 kg/m²     Physical Exam:  General: Alert, well nourished, no acute distress, non-toxic appearing.   Eyes: Anicteric sclera, without conjunctival injection, normal lids, no purulent drainage, EOMs grossly intact.   Ears: No tragal tenderness. " Tympanic membranes intact, pearly grey, without effusion or erythema and with a positive light reflex.   Mouth: Posterior pharynx without erythema. No exudate, ulcerations, or lesion. No tonsillar swelling.   Neck: Supple, full ROM, no rigidity, no cervical adenopathy.   Cardio: Normal rate and rhythm    Resp: Respirations even and unlabored, clear to auscultation bilaterally.   Abd: No ecchymosis or distension. Normal bowel sounds in all 4 quadrants. No tenderness to palpation. No rebound tenderness or guarding. No CVA tenderness.   Skin: No rashes or open lesions noted.   MSK: No swelling. No abrasions or signs of trauma. Ambulating without assistance.   Neuro: Alert,oriented No focal deficits noted. Facial expressions even.   Psych: Cooperative, Normal affect      Procedures    Procedures     Labs     Results for orders placed or performed in visit on 07/11/22   Comprehensive Metabolic Panel   Result Value Ref Range    Sodium Level 137 136 - 145 mmol/L    Potassium Level 4.2 3.5 - 5.1 mmol/L    Chloride 102 98 - 107 mmol/L    Carbon Dioxide 27 22 - 29 mmol/L    Glucose Level 98 74 - 100 mg/dL    Blood Urea Nitrogen 16.6 9.8 - 20.1 mg/dL    Creatinine 0.65 0.55 - 1.02 mg/dL    Calcium Level Total 9.2 8.4 - 10.2 mg/dL    Protein Total 7.4 6.4 - 8.3 gm/dL    Albumin Level 3.9 3.5 - 5.0 gm/dL    Globulin 3.5 2.4 - 3.5 gm/dL    Albumin/Globulin Ratio 1.1 1.1 - 2.0 ratio    Bilirubin Total 0.5 <=1.5 mg/dL    Alkaline Phosphatase 90 40 - 150 unit/L    Alanine Aminotransferase 18 0 - 55 unit/L    Aspartate Aminotransferase 19 5 - 34 unit/L    Estimated GFR-Non  >60 mls/min/1.73/m2   Hemoglobin A1C   Result Value Ref Range    Hemoglobin A1c 5.2 <=7.0 %    Estimated Average Glucose 102.5 mg/dL        Assessment       1. H/O diabetes mellitus    2. Low blood sugar reading    3. Left knee pain, unspecified chronicity           Plan       1. H/O diabetes mellitus  A1c5.2  Estimated average glucose 102.   Patient instructed to monitor blood sugars twice a day once in the morning and once in p.m. and p.r.n. if the knee symptoms are present  Patient to present back to the clinic in 2-3 weeks with Blood glucose  - blood-glucose meter kit; To check BG 2 times daily, to use with insurance preferred meter  Dispense: 1 each; Refill: 0  - lancets Misc; To check BG 2 times daily, to use with insurance preferred meter  Dispense: 1 each; Refill: 0  - blood sugar diagnostic Strp; To check BG 2  times daily, to use with insurance preferred meter  Dispense: 100 each; Refill: 0    2. Low blood sugar reading  - blood-glucose meter kit; To check BG 2 times daily, to use with insurance preferred meter  Dispense: 1 each; Refill: 0  - lancets Misc; To check BG 2 times daily, to use with insurance preferred meter  Dispense: 1 each; Refill: 0  - blood sugar diagnostic Strp; To check BG 2  times daily, to use with insurance preferred meter  Dispense: 100 each; Refill: 0    3. Left knee pain, unspecified chronicity  - Ambulatory referral/consult to Physical/Occupational Therapy; Future  - Ambulatory referral/consult to Orthopedics; Future  - LIDOcaine (LIDODERM) 5 %; Place 1 patch onto the skin once daily. Remove & Discard patch within 12 hours or as directed by MD  Dispense: 5 patch; Refill: 0    Rest, ice compression alternate with heat and elevation  Continue Tylenol p.r.n. for pain    Orders Placed This Encounter    Ambulatory referral/consult to Physical/Occupational Therapy    Ambulatory referral/consult to Orthopedics    blood-glucose meter kit    lancets Misc    blood sugar diagnostic Strp    LIDOcaine (LIDODERM) 5 %      Medication List with Changes/Refills   New Medications    BLOOD SUGAR DIAGNOSTIC STRP    To check BG 2  times daily, to use with insurance preferred meter    BLOOD-GLUCOSE METER KIT    To check BG 2 times daily, to use with insurance preferred meter    LANCETS MISC    To check BG 2 times daily, to use with  insurance preferred meter    LIDOCAINE (LIDODERM) 5 %    Place 1 patch onto the skin once daily. Remove & Discard patch within 12 hours or as directed by MD   Current Medications    ALBUTEROL (PROVENTIL/VENTOLIN HFA) 90 MCG/ACTUATION INHALER    inhale1 puff by mouth 4 times per day as needed for wheezing    DOCUSATE SODIUM (COLACE) 100 MG CAPSULE    TAKE ONE CAPSULE BY MOUTH TWICE DAILY as needed for FOR CONSTIPATION    ERGOCALCIFEROL (ERGOCALCIFEROL) 50,000 UNIT CAP    Take 50,000 Units by mouth every 7 days.    HUMIRA,CF, PEN 40 MG/0.4 ML PNKT    Inject 40 mg into the skin once a week.    MECLIZINE (ANTIVERT) 25 MG TABLET    TAKE ONE TABLET BY MOUTH THREE TIMES DAILY as needed for dizziness    MULTIVITAMIN (THERAGRAN) PER TABLET    Take 1 tablet by mouth once daily.    OMEPRAZOLE (PRILOSEC) 40 MG CAPSULE    Take 40 mg by mouth.    TRIAMCINOLONE ACETONIDE 0.025% (KENALOG) 0.025 % CREAM    apply topically to the affected area 4 times per day as needed for rash for 14 days    TRUE METRIX GLUCOSE TEST STRIP STRP    test once daily   Discontinued Medications    FERROUS SULFATE (FEOSOL) 325 MG (65 MG IRON) TAB TABLET        METFORMIN (GLUCOPHAGE) 500 MG TABLET    TAKE ONE TABLET BY MOUTH DAILY with a meal    METFORMIN (GLUCOPHAGE) 500 MG TABLET    TAKE ONE TABLET BY MOUTH DAILY with a meal    METHYLPREDNISOLONE (MEDROL DOSEPACK) 4 MG TABLET    Take as directed    PAIN AND FEVER 325 MG TABLET               Future Appointments   Date Time Provider Department Center   7/20/2022  2:00 PM Dickson Ayala MD [facility]   8/2/2022  2:40 PM PROVIDER, [facility] FAMILY MEDICINE [facility] SHAINA Burrows

## 2022-07-18 ENCOUNTER — DOCUMENTATION ONLY (OUTPATIENT)
Dept: ADMINISTRATIVE | Facility: HOSPITAL | Age: 52
End: 2022-07-18
Payer: MEDICAID

## 2022-08-02 ENCOUNTER — OFFICE VISIT (OUTPATIENT)
Dept: FAMILY MEDICINE | Facility: CLINIC | Age: 52
End: 2022-08-02
Payer: MEDICAID

## 2022-08-02 VITALS
BODY MASS INDEX: 34.91 KG/M2 | DIASTOLIC BLOOD PRESSURE: 87 MMHG | WEIGHT: 197 LBS | RESPIRATION RATE: 17 BRPM | HEIGHT: 63 IN | SYSTOLIC BLOOD PRESSURE: 124 MMHG | OXYGEN SATURATION: 97 % | HEART RATE: 85 BPM

## 2022-08-02 DIAGNOSIS — Z86.39 H/O DIABETES MELLITUS: Primary | ICD-10-CM

## 2022-08-02 DIAGNOSIS — M25.562 LEFT KNEE PAIN, UNSPECIFIED CHRONICITY: ICD-10-CM

## 2022-08-02 DIAGNOSIS — Z09 FOLLOW UP: ICD-10-CM

## 2022-08-02 PROCEDURE — 3008F BODY MASS INDEX DOCD: CPT | Mod: CPTII,,, | Performed by: PHYSICIAN ASSISTANT

## 2022-08-02 PROCEDURE — 3008F PR BODY MASS INDEX (BMI) DOCUMENTED: ICD-10-PCS | Mod: CPTII,,, | Performed by: PHYSICIAN ASSISTANT

## 2022-08-02 PROCEDURE — 99213 OFFICE O/P EST LOW 20 MIN: CPT | Mod: ,,, | Performed by: PHYSICIAN ASSISTANT

## 2022-08-02 PROCEDURE — 1159F MED LIST DOCD IN RCRD: CPT | Mod: CPTII,,, | Performed by: PHYSICIAN ASSISTANT

## 2022-08-02 PROCEDURE — 99213 PR OFFICE/OUTPT VISIT, EST, LEVL III, 20-29 MIN: ICD-10-PCS | Mod: ,,, | Performed by: PHYSICIAN ASSISTANT

## 2022-08-02 PROCEDURE — 3079F PR MOST RECENT DIASTOLIC BLOOD PRESSURE 80-89 MM HG: ICD-10-PCS | Mod: CPTII,,, | Performed by: PHYSICIAN ASSISTANT

## 2022-08-02 PROCEDURE — 3074F PR MOST RECENT SYSTOLIC BLOOD PRESSURE < 130 MM HG: ICD-10-PCS | Mod: CPTII,,, | Performed by: PHYSICIAN ASSISTANT

## 2022-08-02 PROCEDURE — 3079F DIAST BP 80-89 MM HG: CPT | Mod: CPTII,,, | Performed by: PHYSICIAN ASSISTANT

## 2022-08-02 PROCEDURE — 3074F SYST BP LT 130 MM HG: CPT | Mod: CPTII,,, | Performed by: PHYSICIAN ASSISTANT

## 2022-08-02 PROCEDURE — 1159F PR MEDICATION LIST DOCUMENTED IN MEDICAL RECORD: ICD-10-PCS | Mod: CPTII,,, | Performed by: PHYSICIAN ASSISTANT

## 2022-08-02 RX ORDER — DICLOFENAC SODIUM 10 MG/G
GEL TOPICAL
COMMUNITY
Start: 2022-06-16 | End: 2022-09-07 | Stop reason: SDUPTHER

## 2022-08-02 RX ORDER — ZINC GLUCONATE 50 MG
50 TABLET ORAL DAILY
COMMUNITY
Start: 2022-03-17

## 2022-08-02 RX ORDER — HYDROCODONE BITARTRATE AND ACETAMINOPHEN 7.5; 325 MG/15ML; MG/15ML
SOLUTION ORAL
COMMUNITY
Start: 2022-02-04 | End: 2022-09-07

## 2022-08-02 RX ORDER — VENLAFAXINE HYDROCHLORIDE 150 MG/1
150 CAPSULE, EXTENDED RELEASE ORAL DAILY
COMMUNITY
Start: 2022-03-17 | End: 2023-07-26 | Stop reason: SDUPTHER

## 2022-08-02 RX ORDER — HYOSCYAMINE SULFATE 0.12 MG/1
0.12 TABLET SUBLINGUAL EVERY 4 HOURS PRN
COMMUNITY
Start: 2022-04-19 | End: 2022-09-07 | Stop reason: ALTCHOICE

## 2022-08-02 RX ORDER — SUCRALFATE 1 G/10ML
SUSPENSION ORAL
COMMUNITY
Start: 2022-04-19 | End: 2022-09-07 | Stop reason: ALTCHOICE

## 2022-08-02 RX ORDER — PANTOPRAZOLE SODIUM 40 MG/1
TABLET, DELAYED RELEASE ORAL
COMMUNITY
Start: 2022-03-17 | End: 2023-03-08

## 2022-08-02 NOTE — PROGRESS NOTES
SUBJECTIVE:     History of Present Illness      Chief Complaint: 3 wk f/u    HPI:  Patient is a 51 y.o. year old female who presents to clinic for follow up. Patient was seen a couple weeks ago with complaints of low blood sugars. Patient was told to keep a home blood sugar log and this is her first follow up since.     Patient has history of diabetes and was taking metformin prior to bariatric surgery about 2 years ago. Since surgery and weight loss patient has been off of metformin. Patient was reporting blood sugars around 40-60 at last visit. Patient states she has been performing morning blood sugars and they have been running around . Patient states she discussed this with her bariatric surgeon who stated this was common after surgery and recommended she eat small meals throughout the day and drink protein shakes. Patient states since drinking shakes and eating small frequent meals blood sugars have been better and she is feeling significantly better. Denies polyuria and polydipsia.     Patient continues to have pain in the left knee. Worse with movement and towards the end of the day denies laxity. We made referral to PT at last visit. Patient states she has not yet heard from PT.     Review of Systems:  General: Denies fever, chills, fatigue, myalgias, and change in appetite   Eyes: Denies change in vision, eye redness, eye drainage, eye pain  Resp: Denies wheezing, and shortness of breath   Cardio: Denies chest pain, palpitations, pleuritic pain, and edema   GI: Denies nausea, vomiting, diarrhea, and abdominal pain   : Denies dysuria, hematuria, and discharge   MSK: Left knee pain    Neuro: Denies LOC, dizziness, seizure like activity, and focal deficits   Skin: Denies rashes, open lesions and ulcers      Previous History      Review of patient's allergies indicates:   Allergen Reactions    Penicillin Rash     Past Medical History:   Diagnosis Date    Asthma     GERD (gastroesophageal reflux  disease)     Hidradenitis axillaris     bilateral, had surgery on right    Insulin resistance      Current Outpatient Medications   Medication Instructions    albuterol (PROVENTIL/VENTOLIN HFA) 90 mcg/actuation inhaler inhale1 puff by mouth 4 times per day as needed for wheezing    blood sugar diagnostic Strp To check BG 2  times daily, to use with insurance preferred meter    blood-glucose meter kit To check BG 2 times daily, to use with insurance preferred meter    diclofenac sodium (VOLTAREN) 1 % Gel apply 4 grams TO affected AREA EVERY 6 HOURS AS NEEDED FOR PAIN    docusate sodium (COLACE) 100 MG capsule TAKE ONE CAPSULE BY MOUTH TWICE DAILY as needed for FOR CONSTIPATION    ergocalciferol (ERGOCALCIFEROL) 50,000 Units, Oral, Every 7 days    HUMIRA(CF) PEN 40 mg, Subcutaneous, Weekly    hydrocodone-acetaminophen (HYCET) solution 7.5-325 mg/15mL take 10 mls by mouth every 6 hours as needed for pain for up to 7 days    hyoscyamine (LEVSIN/SL) 0.125 mg, Sublingual, Every 4 hours PRN    lancets Misc To check BG 2 times daily, to use with insurance preferred meter    LIDOcaine (LIDODERM) 5 % apply 1 patch externally to skin every 12 hours as needed for pain then off for 12 hours    meclizine (ANTIVERT) 25 mg tablet TAKE ONE TABLET BY MOUTH THREE TIMES DAILY as needed for dizziness    multivitamin (THERAGRAN) per tablet 1 tablet, Oral, Daily    multivitamin-min-iron-FA-vit K 45 mg iron- 800 mcg-120 mcg Cap Oral    omeprazole (PRILOSEC) 40 mg, Oral    pantoprazole (PROTONIX) 40 MG tablet TAKE ONE TABLET DAILY BY MOUTH    sucralfate (CARAFATE) 100 mg/mL suspension take 10 mls by mouth 4 times per day    triamcinolone acetonide 0.025% (KENALOG) 0.025 % cream apply topically to the affected area 4 times per day as needed for rash for 14 days    TRUE METRIX GLUCOSE TEST STRIP Strp test once daily    venlafaxine (EFFEXOR-XR) 150 mg, Oral, Daily    zinc gluconate 50 mg, Oral, Daily     Past Surgical  "History:   Procedure Laterality Date    AXILLARY HIDRADENITIS EXCISION  in 10/2018    Dr. Payan in [facility], right side, wound dehisced    BILATERAL TUBAL LIGATION       SECTION      CHOLECYSTECTOMY      COLONOSCOPY  2021    GASTRIC BYPASS      LAPAROSCOPIC GASTRIC BANDING      [place], [place]    TONSILLECTOMY       Family History   Problem Relation Age of Onset    Hypertension Mother     Diabetes Mother     Mitral valve prolapse Mother     Hypertension Father     Diabetes Father     Kidney cancer Father      Social History     Tobacco Use    Smoking status: Never Smoker    Smokeless tobacco: Never Used   Substance Use Topics    Alcohol use: Not Currently    Drug use: Never      Health Maintenance      Health Maintenance   Topic Date Due    TETANUS VACCINE  Never done    Mammogram  2022    Lipid Panel  2027    Hepatitis C Screening  Completed     OBJECTIVE:     Physical Exam      Vital Signs Reviewed   /87   Pulse 85   Resp 17   Ht 5' 3" (1.6 m)   Wt 89.4 kg (197 lb)   LMP  (LMP Unknown)   SpO2 97%   BMI 34.90 kg/m²     Physical Exam:  General: Alert, well nourished, no acute distress, non-toxic appearing.   Eyes: Anicteric sclera, without conjunctival injection, normal lids, no purulent drainage, EOMs grossly intact.   Resp: No respiratory distress, no audible wheezes   Skin: No rashes or open lesions noted.   MSK: No swelling. No abrasions or signs of trauma. Ambulating without assistance.   Neuro: CN1-12 intact grossly. No focal deficits noted. Facial expressions even.      Assessment/Plan       1. H/O diabetes mellitus   Morning blood sugars are now running  now that patient is eating small frequent meals throughout the day and incorporating protein shakes.     Continue eating small frequent meals. Continue monitoring blood sugars. Discussed with patient if blood sugars start to become low she should call the office for further " evaluation.     Will continue to monitor.      2. Left knee pain, unspecified chronicity   Followed up on PT referral in clinic. They received referral although never called patient. Patient was given PT appointment in clinic.     Follow up in 6-8 weeks to discuss response to PT and further plan of care.        Wellness scheduled for 3/2023 and labs ordered for wellness in clinic today          Medication List with Changes/Refills   Current Medications    ALBUTEROL (PROVENTIL/VENTOLIN HFA) 90 MCG/ACTUATION INHALER    inhale1 puff by mouth 4 times per day as needed for wheezing    BLOOD SUGAR DIAGNOSTIC STRP    To check BG 2  times daily, to use with insurance preferred meter    BLOOD-GLUCOSE METER KIT    To check BG 2 times daily, to use with insurance preferred meter    DICLOFENAC SODIUM (VOLTAREN) 1 % GEL    apply 4 grams TO affected AREA EVERY 6 HOURS AS NEEDED FOR PAIN    DOCUSATE SODIUM (COLACE) 100 MG CAPSULE    TAKE ONE CAPSULE BY MOUTH TWICE DAILY as needed for FOR CONSTIPATION    ERGOCALCIFEROL (ERGOCALCIFEROL) 50,000 UNIT CAP    Take 50,000 Units by mouth every 7 days.    HUMIRA,CF, PEN 40 MG/0.4 ML PNKT    Inject 40 mg into the skin once a week.    HYDROCODONE-ACETAMINOPHEN (HYCET) SOLUTION 7.5-325 MG/15ML    take 10 mls by mouth every 6 hours as needed for pain for up to 7 days    HYOSCYAMINE (LEVSIN/SL) 0.125 MG SUBL    Place 0.125 mg under the tongue every 4 (four) hours as needed.    LANCETS MISC    To check BG 2 times daily, to use with insurance preferred meter    LIDOCAINE (LIDODERM) 5 %    apply 1 patch externally to skin every 12 hours as needed for pain then off for 12 hours    MECLIZINE (ANTIVERT) 25 MG TABLET    TAKE ONE TABLET BY MOUTH THREE TIMES DAILY as needed for dizziness    MULTIVITAMIN (THERAGRAN) PER TABLET    Take 1 tablet by mouth once daily.    MULTIVITAMIN-MIN-IRON-FA-VIT K 45 MG IRON- 800 MCG-120 MCG CAP    Take by mouth.    OMEPRAZOLE (PRILOSEC) 40 MG CAPSULE    Take 40 mg by  mouth.    PANTOPRAZOLE (PROTONIX) 40 MG TABLET    TAKE ONE TABLET DAILY BY MOUTH    SUCRALFATE (CARAFATE) 100 MG/ML SUSPENSION    take 10 mls by mouth 4 times per day    TRIAMCINOLONE ACETONIDE 0.025% (KENALOG) 0.025 % CREAM    apply topically to the affected area 4 times per day as needed for rash for 14 days    TRUE METRIX GLUCOSE TEST STRIP STRP    test once daily    VENLAFAXINE (EFFEXOR-XR) 150 MG CP24    Take 150 mg by mouth once daily.    ZINC GLUCONATE 50 MG TABLET    Take 50 mg by mouth once daily.       Cat Douglas PA-C    Future Appointments   Date Time Provider Department Center   9/12/2022  2:30 PM [facility] [id number] [facility]   10/3/2022  2:40 PM CAT DOUGLAS [facility] FAMILY MEDICINE [facility] SHAINA Burrows   3/8/2023  3:00 PM SARAI Ramos [facility] SHAINA Burrows   7/26/2023  1:30 PM Dickson Ayala MD [facility]

## 2022-08-26 ENCOUNTER — CLINICAL SUPPORT (OUTPATIENT)
Dept: REHABILITATION | Facility: HOSPITAL | Age: 52
End: 2022-08-26
Attending: NURSE PRACTITIONER
Payer: MEDICAID

## 2022-08-26 DIAGNOSIS — M25.562 LEFT MEDIAL KNEE PAIN: ICD-10-CM

## 2022-08-26 DIAGNOSIS — M62.81 MUSCLE WEAKNESS OF LOWER EXTREMITY: ICD-10-CM

## 2022-08-26 DIAGNOSIS — M25.562 LEFT KNEE PAIN, UNSPECIFIED CHRONICITY: ICD-10-CM

## 2022-08-26 PROCEDURE — 97162 PT EVAL MOD COMPLEX 30 MIN: CPT

## 2022-08-26 NOTE — PLAN OF CARE
"[facility]   Physical Therapy Initial Evaluation     Date: 8/26/2022   Name: Lilian Arroyo  Clinic Number: [id number]    Therapy Diagnosis:   Encounter Diagnoses   Name Primary?    Left knee pain, unspecified chronicity     Left medial knee pain     Muscle weakness of lower extremity      Physician: Rachel Smith FNP    Physician Orders: PT Eval and Treat   Medical Diagnosis from Referral: L knee pain  Evaluation Date: 8/26/2022  Authorization Period Expiration: TBD  Plan of Care Expiration: 10/7/2022  Reassessment / POC Due: 9/23/2022  Visit # / Visits authorized: /     Precautions: Standard and Fall     Time In: 09:00 am  Time Out: 09:35 am  Total Appointment Time (timed & untimed codes): 35 minutes    SUBJECTIVE     Date of onset / History of current condition - Lilian Julia reports: her L knee started hurting without injury about 6 months ago for unknown reason. She was told it was OA. Her R knee used to bother her but it is better now. She has gastric bypass surgery a few yrs ago which helped decrease her pain. She uses lidocaine patches with minimal pain relief, no OTC meds. Does not use AD even though she has had multiple recent falls    KOS (knee disability): 51.4%    Falls: multiple recent falls due to L knee giving out, very difficult to get up unassisted    Imaging: none of L knee    Prior Therapy: yes but for her R knee, no previous PT for L knee  Social History:  lives with their spouse  Occupation: does not work  Prior Level of Function: modified Independent  Current Level of Function: modified Independent due to pain    Pain:  Current 2/10, worst 8/10, best 0/10   Location: left knee    Description: Deep, Sharp and Comes and goes  Aggravating Factors: stretch leg straight out  Easing Factors: rest    Patients goals: "decrease L knee pain"     Medical History:   Past Medical History:   Diagnosis Date    Asthma     GERD (gastroesophageal reflux disease)     Hidradenitis " axillaris     bilateral, had surgery on right    Insulin resistance        Surgical History:   Lilian Arroyo  has a past surgical history that includes Cholecystectomy; Tonsillectomy; Laparoscopic gastric banding (); Axillary hidradenitis excision (in 10/2018); Colonoscopy (2021); Bilateral tubal ligation;  section; and Gastric bypass.    Medications:   Lilian has a current medication list which includes the following prescription(s): albuterol, blood sugar diagnostic, blood-glucose meter, diclofenac sodium, docusate sodium, ergocalciferol, humira(cf) pen, hydrocodone-apap 7.5-325 mg/15 ml, hyoscyamine, lancets, lidocaine, meclizine, multivitamin, multivitamin-min-iron-fa-vit k, omeprazole, pantoprazole, sucralfate, triamcinolone acetonide 0.025%, true metrix glucose test strip, venlafaxine, and zinc gluconate.    Allergies:   Review of patient's allergies indicates:   Allergen Reactions    Penicillin Rash          OBJECTIVE     L knee ROM: flexion and extension WNL  L LE strength: 4-/5 MMT throughout  Tender to palpation: medial joint line  (-) Anterior Drawer- ACL  (-) Posterior Drawer- PCL  (-) Varus stress test - LCL  (-) Valgus stress test- MCL  (-) Thessaly test- meniscus    5x sit to stand: 16 secs without use of B UE  Good arches in B feet for proper knee alignment    TREATMENT     Total Treatment time (time-based codes) separate from Evaluation: 5 minutes      Lilian Dumont received the treatments listed below:      therapeutic exercises to develop strength and endurance for 5 minutes including:    Therapeutic Exercise Grid     Exercise 1  Exercise 2  Exercise 3  Exercise 4    Exercise :    L LE: SLR, VMO, sidelying hip abduction, adduction LAQ with TB, knee flexion with TB Sit to stands L step-ups   Repetition/Time :                  Resist or Assist :                  Comment :                Done :                                Exercise 5  Exercise 6  Exercise 7  Exercise 8    Exercise :     Heel raises, squats   Leg press NuStep        Repetition/Time :                  Resist or Assist :                  Comment :                  Done :                                  Exercise 9  Exercise 10  Exercise 11  Exercise 12    Exercise :                  Repetition/Time :                  Resist or Assist :                  Comment :                  Done :                                 PATIENT EDUCATION AND HOME EXERCISES     Education provided / Written Home Exercises Provided  Exercises and stretches demonstrated and initiated, written HEP issued, educated pt on importance and benefits of exercises and stretches, and encouraged pt to continue with HEP daily. Pt voiced understanding    ASSESSMENT     Lilian is a 51 y.o. female referred to outpatient Physical Therapy with a medical diagnosis of L knee pain. Patient presents with pain along medial L knee joint line and LE muscle weakness which limit daily activities. Pt (-) with all special tests performed to assess ligaments that surround knee joint. Discussed goal of PT is to strengthen muscles that surround knee joint to reduce stress on knee however if pain due to OA, PT will not get rid of the degenerative changes     Patient prognosis is Good.   Patient will benefit from skilled outpatient Physical Therapy to address the deficits stated above and in the chart below, provide patient /family education, and to maximize patientt's level of independence.     Plan of care discussed with patient: Yes  Patient's spiritual, cultural and educational needs considered and patient is agreeable to the plan of care and goals as stated below:     Anticipated Barriers for therapy: chronic knee pain from previous obesity    Goals:  Short Term Goals: 4 weeks     Pt will demonstrate knowledge and independence with HEP to continue with exercises outside of therapy    Reduce c/o pain in L knee to < 2/10 pain level with daily activities    Improve strength in L LE to 4/5 MMT  throughout in order to improve tolerance with overall functional mobility    Long Term Goals: 6 weeks     Pt will improve score on KOS by 10 points which indicates improved ability to perform daily tasks with less difficulty and pain    Improve strength in L LE to 4+/5 MMT throughout in order to improve tolerance with overall functional mobility    Pt will reduce risk for falls and improve ability and safety with transfers as evidence by improved 5x sit to stands to < 12 secs    PLAN     Outpatient Physical Therapy 2 times weekly for 6 weeks to include the following interventions: Neuromuscular Re-ed, Patient Education, Therapeutic Exercise and pain management.     Courtney Paulson, PT      I CERTIFY THE NEED FOR THESE SERVICES FURNISHED UNDER THIS PLAN OF TREATMENT AND WHILE UNDER MY CARE   Physician's comments:     Physician's Signature: ___________________________________________________

## 2022-09-01 DIAGNOSIS — R42 VERTIGO: Primary | ICD-10-CM

## 2022-09-01 DIAGNOSIS — M25.562 LEFT KNEE PAIN: Primary | ICD-10-CM

## 2022-09-01 RX ORDER — MECLIZINE HYDROCHLORIDE 25 MG/1
TABLET ORAL
Qty: 30 TABLET | Refills: 0 | Status: SHIPPED | OUTPATIENT
Start: 2022-09-01 | End: 2022-09-01 | Stop reason: SDUPTHER

## 2022-09-01 NOTE — TELEPHONE ENCOUNTER
----- Message from Richi Matthew sent at 9/1/2022  9:31 AM CDT -----  .Type:  RX Refill Request    Who Called: Des Arroyo  Refill or New Rx: refill  RX Name and Strength:meclizine (ANTIVERT) 25 mg tablet  How is the patient currently taking it? (ex. 1XDay):TAKE ONE TABLET BY MOUTH THREE TIMES DAILY as needed for dizziness  Is this a 30 day or 90 day RX:  Preferred Pharmacy with phone number:[facility] [address]  Local or Mail Order: local  Ordering Provider:  Would the patient rather a call back or a response via [facility]? Call back  Best Call Back Number:[phone]  Additional Information:

## 2022-09-06 RX ORDER — MECLIZINE HYDROCHLORIDE 25 MG/1
TABLET ORAL
Qty: 30 TABLET | Refills: 0 | Status: SHIPPED | OUTPATIENT
Start: 2022-09-06 | End: 2024-03-11 | Stop reason: SDUPTHER

## 2022-09-07 ENCOUNTER — OFFICE VISIT (OUTPATIENT)
Dept: FAMILY MEDICINE | Facility: CLINIC | Age: 52
End: 2022-09-07
Payer: MEDICAID

## 2022-09-07 VITALS
DIASTOLIC BLOOD PRESSURE: 85 MMHG | WEIGHT: 199.63 LBS | BODY MASS INDEX: 35.37 KG/M2 | HEART RATE: 86 BPM | RESPIRATION RATE: 16 BRPM | SYSTOLIC BLOOD PRESSURE: 131 MMHG | OXYGEN SATURATION: 97 % | HEIGHT: 63 IN

## 2022-09-07 DIAGNOSIS — R07.89 MUSCULOSKELETAL CHEST PAIN: Primary | ICD-10-CM

## 2022-09-07 DIAGNOSIS — K21.9 GASTROESOPHAGEAL REFLUX DISEASE, UNSPECIFIED WHETHER ESOPHAGITIS PRESENT: ICD-10-CM

## 2022-09-07 DIAGNOSIS — E27.8 ADRENAL NODULE: ICD-10-CM

## 2022-09-07 PROCEDURE — 3008F PR BODY MASS INDEX (BMI) DOCUMENTED: ICD-10-PCS | Mod: CPTII,,, | Performed by: PHYSICIAN ASSISTANT

## 2022-09-07 PROCEDURE — 1160F RVW MEDS BY RX/DR IN RCRD: CPT | Mod: CPTII,,, | Performed by: PHYSICIAN ASSISTANT

## 2022-09-07 PROCEDURE — 99214 OFFICE O/P EST MOD 30 MIN: CPT | Mod: ,,, | Performed by: PHYSICIAN ASSISTANT

## 2022-09-07 PROCEDURE — 3079F DIAST BP 80-89 MM HG: CPT | Mod: CPTII,,, | Performed by: PHYSICIAN ASSISTANT

## 2022-09-07 PROCEDURE — 1160F PR REVIEW ALL MEDS BY PRESCRIBER/CLIN PHARMACIST DOCUMENTED: ICD-10-PCS | Mod: CPTII,,, | Performed by: PHYSICIAN ASSISTANT

## 2022-09-07 PROCEDURE — 3008F BODY MASS INDEX DOCD: CPT | Mod: CPTII,,, | Performed by: PHYSICIAN ASSISTANT

## 2022-09-07 PROCEDURE — 1159F PR MEDICATION LIST DOCUMENTED IN MEDICAL RECORD: ICD-10-PCS | Mod: CPTII,,, | Performed by: PHYSICIAN ASSISTANT

## 2022-09-07 PROCEDURE — 3075F SYST BP GE 130 - 139MM HG: CPT | Mod: CPTII,,, | Performed by: PHYSICIAN ASSISTANT

## 2022-09-07 PROCEDURE — 3075F PR MOST RECENT SYSTOLIC BLOOD PRESS GE 130-139MM HG: ICD-10-PCS | Mod: CPTII,,, | Performed by: PHYSICIAN ASSISTANT

## 2022-09-07 PROCEDURE — 99214 PR OFFICE/OUTPT VISIT, EST, LEVL IV, 30-39 MIN: ICD-10-PCS | Mod: ,,, | Performed by: PHYSICIAN ASSISTANT

## 2022-09-07 PROCEDURE — 1159F MED LIST DOCD IN RCRD: CPT | Mod: CPTII,,, | Performed by: PHYSICIAN ASSISTANT

## 2022-09-07 PROCEDURE — 3079F PR MOST RECENT DIASTOLIC BLOOD PRESSURE 80-89 MM HG: ICD-10-PCS | Mod: CPTII,,, | Performed by: PHYSICIAN ASSISTANT

## 2022-09-07 RX ORDER — ADALIMUMAB 40MG/0.8ML
40 KIT SUBCUTANEOUS
COMMUNITY
Start: 2022-08-21

## 2022-09-07 RX ORDER — OMEPRAZOLE 10 MG/1
CAPSULE, DELAYED RELEASE ORAL
COMMUNITY
Start: 2022-08-21 | End: 2023-07-25

## 2022-09-07 RX ORDER — MAG HYDROX/ALUMINUM HYD/SIMETH 200-200-20
30 SUSPENSION, ORAL (FINAL DOSE FORM) ORAL EVERY 6 HOURS PRN
Qty: 354 ML | Refills: 0 | Status: SHIPPED | OUTPATIENT
Start: 2022-09-07 | End: 2024-03-11 | Stop reason: ALTCHOICE

## 2022-09-07 RX ORDER — OXYCODONE AND ACETAMINOPHEN 5; 325 MG/1; MG/1
TABLET ORAL
COMMUNITY
Start: 2022-08-22 | End: 2022-09-07 | Stop reason: ALTCHOICE

## 2022-09-07 RX ORDER — DOCUSATE SODIUM 100 MG/1
100 CAPSULE, LIQUID FILLED ORAL
COMMUNITY
Start: 2022-08-21 | End: 2022-09-07 | Stop reason: SDUPTHER

## 2022-09-07 RX ORDER — VENLAFAXINE 50 MG/1
TABLET ORAL
COMMUNITY
Start: 2022-08-21 | End: 2022-09-07

## 2022-09-07 RX ORDER — DOCUSATE SODIUM 100 MG/1
100 CAPSULE, LIQUID FILLED ORAL 2 TIMES DAILY
Qty: 60 CAPSULE | Refills: 1 | Status: SHIPPED | OUTPATIENT
Start: 2022-09-07 | End: 2023-01-18

## 2022-09-07 RX ORDER — HYDROCODONE BITARTRATE AND ACETAMINOPHEN 5; 325 MG/1; MG/1
TABLET ORAL
COMMUNITY
Start: 2022-08-22 | End: 2022-09-07 | Stop reason: ALTCHOICE

## 2022-09-07 NOTE — PROGRESS NOTES
"  SUBJECTIVE:     History of Present Illness      Chief Complaint: Follow-up (C/O chest pain "all the time".  ER visit one week ago approximately and was told vertigo.  Symptoms include veritgo, chest pain (non exertional).  States saw cardiologist.  States esophagus not straight and seeing GI on September 15th)    HPI:  Patient is a 52 y.o. year old female who presents to clinic with complaints of chronic epigastric abdominal pain and chest pain. Symptoms have been present for "the past year". Symptoms are constant. Pain is described as burning. Denies associated symptoms of SOB, LE swelling, and palpitations. Patient states she saw her gastric bypass doctor for this and states she had multiple procedures to have her esophagus dilated. Patient states she also had an upper scope a while back and is not sure of the results. Patient states symptoms are reproduced when she presses on her chest. Denies left sided arm pain, radiating pain, left jaw pain. Patient denies bright red blood in the stool, hematemesis, coffee ground emesis and dark tarry stools. She was seen in the ER for this again on 8/28/2022 and per patient workup was negative and she was discharged home. Patient states in the ER she was given a GI cocktail and this did help with her symptoms.     We did receive fax of CT chest/abdomen from ER visit which showed moderate hiatal hernia with patulous fluid-filled distal esophagus. As well as a 19 mm intermediate hypodense left adrenal nodule but otherwise was fairly normal.     Next appointment with Bariatric/General surgeon is on 9/15/2022. Patient also follows with cardiology [facility] yearly. Patient also sees Dr. Carlos CHOI.     Review of Systems:  General: Denies fever, chills, fatigue, myalgias, and change in appetite   Eyes: Denies change in vision, eye redness, eye drainage, eye pain  ENT: Denies ear pain or pressure, rhinorrhea, nasal congestion, sore throat, and trouble swallowing  Resp: Denies wheezing, " and shortness of breath   Cardio: See above   GI: See above   MSK: Denies trauma, joint pain, and trouble ambulating   Neuro: Denies LOC, dizziness, seizure like activity, and focal deficits   Skin: Denies rashes, open lesions and ulcers      Previous History      Review of patient's allergies indicates:   Allergen Reactions    Aspirin      Other reaction(s): Bleeding  unable to take due to Gastric Bypass    Penicillin Rash     Past Medical History:   Diagnosis Date    Asthma     GERD (gastroesophageal reflux disease)     Hidradenitis axillaris     bilateral, had surgery on right    Insulin resistance      Current Outpatient Medications   Medication Instructions    albuterol (PROVENTIL/VENTOLIN HFA) 90 mcg/actuation inhaler inhale1 puff by mouth 4 times per day as needed for wheezing    aluminum-magnesium hydroxide-simethicone (MAALOX) 200-200-20 mg/5 mL Susp 30 mLs, Oral, Every 6 hours PRN    blood sugar diagnostic Strp To check BG 2  times daily, to use with insurance preferred meter    blood-glucose meter kit To check BG 2 times daily, to use with insurance preferred meter    diclofenac sodium (VOLTAREN) 1 % Gel apply 4 grams TO affected AREA EVERY 6 HOURS AS NEEDED FOR PAIN<BR>Strength: 1 %    docusate sodium (COLACE) 100 mg, Oral, 2 times daily    ergocalciferol (ERGOCALCIFEROL) 50,000 Units, Oral, Every 7 days    HUMIRA 40 mg, Subcutaneous    lancets (TRUEPLUS LANCETS) 30 gauge Misc USE TO check sugar TWICE DAILY    LIDOcaine (LIDODERM) 5 % apply 1 patch externally to skin every 12 hours as needed for pain then off for 12 hours    meclizine (ANTIVERT) 25 mg tablet TAKE ONE TABLET BY MOUTH THREE TIMES DAILY as needed for dizziness<BR>Strength: 25 mg    multivitamin with minerals tablet Oral    omeprazole (PRILOSEC) 10 MG capsule   1, Daily, 0 Refill(s), Start Date: 08/21/22 21:04:00 CDT    pantoprazole (PROTONIX) 40 MG tablet TAKE ONE TABLET DAILY BY MOUTH    TRUE METRIX GLUCOSE TEST STRIP Strp test once daily  "   venlafaxine (EFFEXOR-XR) 150 mg, Oral, Daily    zinc gluconate 50 mg, Oral, Daily     Past Surgical History:   Procedure Laterality Date    AXILLARY HIDRADENITIS EXCISION  in 10/2018    Dr. Payan in [facility], right side, wound dehisced    BILATERAL TUBAL LIGATION       SECTION      CHOLECYSTECTOMY      COLONOSCOPY  2021    GASTRIC BYPASS      LAPAROSCOPIC GASTRIC BANDING      [place], [place]    TONSILLECTOMY       Family History   Problem Relation Age of Onset    Hypertension Mother     Diabetes Mother     Mitral valve prolapse Mother     Hypertension Father     Diabetes Father     Kidney cancer Father      Social History     Tobacco Use    Smoking status: Never    Smokeless tobacco: Never   Substance Use Topics    Alcohol use: Not Currently    Drug use: Never      Health Maintenance      Health Maintenance   Topic Date Due    TETANUS VACCINE  Never done    Mammogram  2022    Lipid Panel  2027    Hepatitis C Screening  Completed     OBJECTIVE:     Physical Exam      Vital Signs Reviewed   /85   Pulse 86   Resp 16   Ht 5' 3" (1.6 m)   Wt 90.5 kg (199 lb 9.6 oz)   LMP  (LMP Unknown)   SpO2 97%   BMI 35.36 kg/m²     Physical Exam:  General: Alert, well nourished, no acute distress, non-toxic appearing.   Eyes: Anicteric sclera, without conjunctival injection, normal lids, no purulent drainage, EOMs fully intact.   Ears: No tragal tenderness. Tympanic membranes intact, pearly grey, without effusion or erythema and with a positive light reflex.   Mouth: Posterior pharynx without erythema. No exudate, ulcerations, or lesion. No tonsillar swelling.   Neck: Supple, full ROM, no rigidity, no cervical adenopathy.   Cardio: Normal rate and rhythm without murmurs, gallops, or rubs. Reproducible chest pain with palpation of the sternum.   Resp: Respirations even and unlabored, clear to auscultation bilaterally.   Abd: No ecchymosis or distension. Normal bowel sounds in all 4 " quadrants. Tenderness to palpation in the epigastric region. No rebound tenderness or guarding. No CVA tenderness.   Skin: No rashes or open lesions noted.   MSK: No swelling. No abrasions or signs of trauma. Ambulating without assistance.   Neuro: CN1-12 intact grossly. No focal deficits noted. Facial expressions even.      Assessment     1. Musculoskeletal chest pain   Presents with reproducible chest pain and epigastric burning for the past year. History of Sreekanth-en-Y gastric bypass, esophageal dilation, hiatal hernia and GERD.  Per the patient she recently had an upper scope performed by her gastric bypass doctor and has an appointment with him on 09/15/2022.      Patient did go to the ER for similar symptoms and her workup was normal per the patient and she was discharged home.  She did have relief with GI cocktail in the ER.      CT of the chest and abdomen performed in ER did show moderate hiatal hernia with patulous fluid-filled distal esophagus. As well as a 19 mm intermediate hypodense left adrenal nodule but otherwise was fairly normal.     Chest pain is reproducible with palpation of the sternum on exam. Patient also had mild epigastric tenderness without rebound tenderness or guarding. Denies associated shortness of breath and palpitations. No radiation of symptoms to the left arm or jaw.    Discussed with patient differentials including musculoskeletal chest pain, chest pain related to GERD and previous procedures, as well as cardiac chest pain.      Patient did have relief of symptoms with GI cocktail in ER. She is on pantoprazole 40 mg daily.     Maalox 30 mL every 6 hours as needed. Discussed with patient if chest pain symptoms persist and or worsen she should report to ER immediately. Discussed with patient if she starts having other symptoms including SOB, palpitations, radiation of pain and or any new/worsening symptoms she should go to ER for further evaluation.     Recommended follow up with   Manuelito (Gastric doctor) as well as Dr. Gonzalez for their recommendations.      Patient expressed understanding and agreed to the above.      2. Gastroesophageal reflux disease, unspecified whether esophagitis present   Lifestyle modifications including avoidance of any food that may precipitate heartburn including alcohol, caffeine, chocolate, coffee, citrus fruits, onion, garlic, tomatoes, spicy foods, and high acidity foods.  Weight loss may help.  Elevate the head of the bed and avoid laying flat for 3-4 hours after meals.     3. Adrenal Nodule   CT of the chest and abdomen performed in ER on 8/21/2022 showed a 19 mm intermediate hypodense left adrenal nodule but otherwise was fairly normal.     Refer to endocrinology      Orders Placed This Encounter    docusate sodium (COLACE) 100 MG capsule    aluminum-magnesium hydroxide-simethicone (MAALOX) 200-200-20 mg/5 mL Susp      Medication List with Changes/Refills   New Medications    ALUMINUM-MAGNESIUM HYDROXIDE-SIMETHICONE (MAALOX) 200-200-20 MG/5 ML SUSP    Take 30 mLs by mouth every 6 (six) hours as needed (Acid reflux).   Current Medications    ADALIMUMAB (HUMIRA) 40 MG/0.8 ML SYKT INJECTION    Inject 40 mg into the skin.    ALBUTEROL (PROVENTIL/VENTOLIN HFA) 90 MCG/ACTUATION INHALER    inhale1 puff by mouth 4 times per day as needed for wheezing    BLOOD SUGAR DIAGNOSTIC STRP    To check BG 2  times daily, to use with insurance preferred meter    BLOOD-GLUCOSE METER KIT    To check BG 2 times daily, to use with insurance preferred meter    DICLOFENAC SODIUM (VOLTAREN) 1 % GEL    apply 4 grams TO affected AREA EVERY 6 HOURS AS NEEDED FOR PAIN  Strength: 1 %    ERGOCALCIFEROL (ERGOCALCIFEROL) 50,000 UNIT CAP    Take 50,000 Units by mouth every 7 days.    LANCETS (TRUEPLUS LANCETS) 30 GAUGE MISC    USE TO check sugar TWICE DAILY    LIDOCAINE (LIDODERM) 5 %    apply 1 patch externally to skin every 12 hours as needed for pain then off for 12 hours    MECLIZINE  (ANTIVERT) 25 MG TABLET    TAKE ONE TABLET BY MOUTH THREE TIMES DAILY as needed for dizziness  Strength: 25 mg    MULTIVITAMIN WITH MINERALS TABLET    Take by mouth.    OMEPRAZOLE (PRILOSEC) 10 MG CAPSULE      1, Daily, 0 Refill(s), Start Date: 08/21/22 21:04:00 CDT    PANTOPRAZOLE (PROTONIX) 40 MG TABLET    TAKE ONE TABLET DAILY BY MOUTH    TRUE METRIX GLUCOSE TEST STRIP STRP    test once daily    VENLAFAXINE (EFFEXOR-XR) 150 MG CP24    Take 150 mg by mouth once daily.    ZINC GLUCONATE 50 MG TABLET    Take 50 mg by mouth once daily.   Changed and/or Refilled Medications    Modified Medication Previous Medication    DOCUSATE SODIUM (COLACE) 100 MG CAPSULE docusate sodium (COLACE) 100 MG capsule       Take 1 capsule (100 mg total) by mouth 2 (two) times daily.    Take 100 mg by mouth.   Discontinued Medications    DOCUSATE SODIUM (COLACE) 100 MG CAPSULE    TAKE ONE CAPSULE BY MOUTH TWICE DAILY as needed for FOR CONSTIPATION    ERGOCALCIFEROL, VITAMIN D2, (VITAMIN D ORAL)    Take by mouth.    HUMIRA,CF, PEN 40 MG/0.4 ML PNKT    Inject 40 mg into the skin once a week.    HYDROCODONE-ACETAMINOPHEN (HYCET) SOLUTION 7.5-325 MG/15ML    take 10 mls by mouth every 6 hours as needed for pain for up to 7 days    HYDROCODONE-ACETAMINOPHEN (NORCO) 5-325 MG PER TABLET    TAKE 1 OR 2 TABLETS BY MOUTH EVERY 6 HOURS AS NEEDED FOR PAIN    HYOSCYAMINE (LEVSIN/SL) 0.125 MG SUBL    Place 0.125 mg under the tongue every 4 (four) hours as needed.    MULTIVITAMIN (THERAGRAN) PER TABLET    Take 1 tablet by mouth once daily.    MULTIVITAMIN-MIN-IRON-FA-VIT K 45 MG IRON- 800 MCG-120 MCG CAP    Take by mouth.    OMEPRAZOLE (PRILOSEC) 40 MG CAPSULE    Take 40 mg by mouth.    OXYCODONE-ACETAMINOPHEN (PERCOCET) 5-325 MG PER TABLET    Take by mouth.    SUCRALFATE (CARAFATE) 100 MG/ML SUSPENSION    take 10 mls by mouth 4 times per day    TRIAMCINOLONE ACETONIDE 0.025% (KENALOG) 0.025 % CREAM    apply topically to the affected area 4 times per day  as needed for rash for 14 days    VENLAFAXINE (EFFEXOR) 50 MG TAB    Take by mouth.         No follow-ups on file.    Cat Douglas PA-C    Future Appointments   Date Time Provider Department Center   9/8/2022  1:00 PM Courtney Paulson, PT [facility]   9/12/2022  2:30 PM [facility]   9/13/2022  1:00 PM Courtney Paulson PT [facility]   10/3/2022  2:40 PM CAT DOUGLAS, [facility] SHAINA Burrows   3/8/2023  [facility]   7/26/2023  1:30 PM Dickson Ayala MD [facility]

## 2022-09-08 ENCOUNTER — CLINICAL SUPPORT (OUTPATIENT)
Dept: REHABILITATION | Facility: HOSPITAL | Age: 52
End: 2022-09-08
Attending: NURSE PRACTITIONER
Payer: MEDICAID

## 2022-09-08 DIAGNOSIS — M25.562 LEFT MEDIAL KNEE PAIN: Primary | ICD-10-CM

## 2022-09-08 DIAGNOSIS — M62.81 MUSCLE WEAKNESS OF LOWER EXTREMITY: ICD-10-CM

## 2022-09-08 PROCEDURE — 97110 THERAPEUTIC EXERCISES: CPT

## 2022-09-08 NOTE — PROGRESS NOTES
[facility]   Physical Therapy Treatment Note     Name: Lilian Arroyo  Clinic Number: [id number]    Therapy Diagnosis: No diagnosis found.  Physician: Rachel Smith FNP    Authorization Period Expiration: 10/13/2022  Plan of Care Expiration: 10/13/2022  Reassessment / POC Due: 9/23/2022  Visit # / Visits authorized: 1/ 12     Visit Date: 9/8/2022    Time In: 13:00  Time Out: 13:30  Total Billable Time: 30 minutes    SUBJECTIVE     Pt reports: she has been having problems with vertigo and has to see a GI specialist in [place] next week.  She was compliant with some of home exercise program.  Response to previous treatment: n/a  Functional change: n/a    Pain: 5/10 without medication for pain  Location: left knee      OBJECTIVE     Objective Measures updated at progress report unless specified.     Treatment     Lilian Dumont received the treatments listed below:      therapeutic exercises to develop strength and endurance for 30 minutes including:     Therapeutic Exercise Grid     Exercise 1  Exercise 2  Exercise 3  Exercise 4    Exercise :    L LE: SLR, VMO, sidelying hip abduction, adduction LAQ with TB, knee flexion with TB Sit to stands L step-ups   Repetition/Time :    10   10   10   10     Resist or Assist :       Red TB           Comment :                 Done :    yes  yes    yes   yes                     Exercise 5  Exercise 6  Exercise 7  Exercise 8    Exercise :    Heel raises, squats   Leg press NuStep        Repetition/Time :    10   15   6 min        Resist or Assist :       50 lbs   L4        Comment :          UE and LE        Done :    yes   yes   yes                         Exercise 9  Exercise 10  Exercise 11  Exercise 12    Exercise :                  Repetition/Time :                  Resist or Assist :                  Comment :                  Done :                                  Patient Education and Home Exercises     Home Exercises Provided and Patient  Education Provided     Education provided: Educated pt on importance of HEP and encouraged pt to continue daily    ASSESSMENT     Pt tolerated initiation of L LE strengthening exercises well performed with good effort with cues required for proper technique to isolate specific muscles. Discussed importance of wearing good supportive shoes for proper alignment to reduce knee pain    Lilain Dumont Is progressing well towards her goals.   Pt prognosis is Good.     Pt will continue to benefit from skilled outpatient physical therapy to address the deficits listed in the problem list box on initial evaluation, provide pt/family education and to maximize pt's level of independence in the home and community environment.     Pt's spiritual, cultural and educational needs considered and pt agreeable to plan of care and goals.     Anticipated barriers to physical therapy: chronic knee pain from previous obesity     Goals:  Short Term Goals: 4 weeks      Pt will demonstrate knowledge and independence with HEP to continue with exercises outside of therapy     Reduce c/o pain in L knee to < 2/10 pain level with daily activities     Improve strength in L LE to 4/5 MMT throughout in order to improve tolerance with overall functional mobility     Long Term Goals: 6 weeks      Pt will improve score on KOS by 10 points which indicates improved ability to perform daily tasks with less difficulty and pain     Improve strength in L LE to 4+/5 MMT throughout in order to improve tolerance with overall functional mobility     Pt will reduce risk for falls and improve ability and safety with transfers as evidence by improved 5x sit to stands to < 12 secs    PLAN     Continue with current Plan of Care and progress per pt's tolerance    Courtney Paulson, PT

## 2022-09-12 ENCOUNTER — HOSPITAL ENCOUNTER (OUTPATIENT)
Dept: RADIOLOGY | Facility: HOSPITAL | Age: 52
Discharge: HOME | End: 2022-09-12
Attending: OBSTETRICS & GYNECOLOGY
Payer: MEDICAID

## 2022-09-12 DIAGNOSIS — Z12.31 ENCOUNTER FOR SCREENING MAMMOGRAM FOR MALIGNANT NEOPLASM OF BREAST: ICD-10-CM

## 2022-09-12 PROCEDURE — 77067 SCR MAMMO BI INCL CAD: CPT | Mod: 26,,, | Performed by: RADIOLOGY

## 2022-09-12 PROCEDURE — 77067 MAMMO DIGITAL SCREENING BILAT WITH TOMO: ICD-10-PCS | Mod: 26,,, | Performed by: RADIOLOGY

## 2022-09-12 PROCEDURE — 77063 BREAST TOMOSYNTHESIS BI: CPT | Mod: 26,,, | Performed by: RADIOLOGY

## 2022-09-12 PROCEDURE — 77063 MAMMO DIGITAL SCREENING BILAT WITH TOMO: ICD-10-PCS | Mod: 26,,, | Performed by: RADIOLOGY

## 2022-09-12 PROCEDURE — 77067 SCR MAMMO BI INCL CAD: CPT | Mod: TC

## 2022-09-13 ENCOUNTER — CLINICAL SUPPORT (OUTPATIENT)
Dept: REHABILITATION | Facility: HOSPITAL | Age: 52
End: 2022-09-13
Attending: NURSE PRACTITIONER
Payer: MEDICAID

## 2022-09-13 DIAGNOSIS — M25.562 LEFT MEDIAL KNEE PAIN: Primary | ICD-10-CM

## 2022-09-13 DIAGNOSIS — M62.81 MUSCLE WEAKNESS OF LOWER EXTREMITY: ICD-10-CM

## 2022-09-13 PROCEDURE — 97110 THERAPEUTIC EXERCISES: CPT

## 2022-09-13 NOTE — PROGRESS NOTES
[facility]   Physical Therapy Treatment Note     Name: Lilian Arroyo  Clinic Number: [id number]    Therapy Diagnosis:   Encounter Diagnoses   Name Primary?    Left medial knee pain Yes    Muscle weakness of lower extremity      Physician: Rachel Smith FNP    Authorization Period Expiration: 10/13/2022  Plan of Care Expiration: 10/13/2022  Reassessment / POC Due: 9/23/2022  Visit # / Visits authorized: 2/ 12     Visit Date: 9/13/2022    Time In: 13:00  Time Out: 13:35  Total Billable Time: 35 minutes    SUBJECTIVE     Pt reports: feeling pretty good, went walking yesterday  She was compliant with some of home exercise program.  Response to previous treatment: good  Functional change: less pain with mobility    Pain: 5/10 without medication for pain  Location: left knee      OBJECTIVE     N/a    Treatment     Lilian Dumont received the treatments listed below:      therapeutic exercises to develop strength and endurance for 30 minutes including:     Therapeutic Exercise Grid     Exercise 1  Exercise 2  Exercise 3  Exercise 4    Exercise :    L LE: SLR, VMO, sidelying hip abduction, adduction LAQ with TB, knee flexion with TB Sit to stands L step-ups   Repetition/Time :    15   15   15   15     Resist or Assist :       green TB   10 lbs   8 inch box     Comment :                 Done :    yes  yes    yes   yes                     Exercise 5  Exercise 6  Exercise 7  Exercise 8    Exercise :    Heel raises, squats   Leg press NuStep        Repetition/Time :    15   15   10 min        Resist or Assist :       50 lbs   L4        Comment :          UE and LE        Done :    yes   yes   yes                         Exercise 9  Exercise 10  Exercise 11  Exercise 12    Exercise :                  Repetition/Time :                  Resist or Assist :                  Comment :                  Done :                                  Patient Education and Home Exercises     Home Exercises Provided and  Patient Education Provided     Education provided: Educated pt on importance of HEP and encouraged pt to continue daily    ASSESSMENT     Pt able to increase reps with all L LE strengthening exercises and time on NuStep performed for endurance and strengthening performed with good effort with no c/o increased L knee pain. Pt wore more supportive shoes to therapy today as recommended    Lilian Dumont Is progressing well towards her goals.   Pt prognosis is Good.     Pt will continue to benefit from skilled outpatient physical therapy to address the deficits listed in the problem list box on initial evaluation, provide pt/family education and to maximize pt's level of independence in the home and community environment.     Pt's spiritual, cultural and educational needs considered and pt agreeable to plan of care and goals.     Anticipated barriers to physical therapy: chronic knee pain from previous obesity     Goals:  Short Term Goals: 4 weeks      Pt will demonstrate knowledge and independence with HEP to continue with exercises outside of therapy     Reduce c/o pain in L knee to < 2/10 pain level with daily activities     Improve strength in L LE to 4/5 MMT throughout in order to improve tolerance with overall functional mobility     Long Term Goals: 6 weeks      Pt will improve score on KOS by 10 points which indicates improved ability to perform daily tasks with less difficulty and pain     Improve strength in L LE to 4+/5 MMT throughout in order to improve tolerance with overall functional mobility     Pt will reduce risk for falls and improve ability and safety with transfers as evidence by improved 5x sit to stands to < 12 secs    PLAN     Continue with current Plan of Care and progress per pt's tolerance    Courtney Paulson, PT

## 2022-09-15 NOTE — PROGRESS NOTES
Please contact patient and let them know mammogram results showed no evidence of malignancy.  Recommended repeat screening in one year (September 2023).

## 2022-09-19 ENCOUNTER — CLINICAL SUPPORT (OUTPATIENT)
Dept: REHABILITATION | Facility: HOSPITAL | Age: 52
End: 2022-09-19
Attending: NURSE PRACTITIONER
Payer: MEDICAID

## 2022-09-19 DIAGNOSIS — M25.562 LEFT MEDIAL KNEE PAIN: Primary | ICD-10-CM

## 2022-09-19 DIAGNOSIS — M62.81 MUSCLE WEAKNESS OF LOWER EXTREMITY: ICD-10-CM

## 2022-09-19 PROCEDURE — 97110 THERAPEUTIC EXERCISES: CPT

## 2022-09-19 NOTE — PROGRESS NOTES
"[facility]   Physical Therapy Treatment Note     Name: Lilian Arroyo  Clinic Number: [id number]    Therapy Diagnosis:   No diagnosis found.    Physician: Rachel Smith FNP    Authorization Period Expiration: 10/13/2022  Plan of Care Expiration: 10/13/2022  Reassessment / POC Due: 9/23/2022  Visit # / Visits authorized: 2/ 12     Visit Date: 9/19/2022    Time In: 13:00  Time Out: 13:30  Total Billable Time: 30 minutes    SUBJECTIVE     Pt reports: no pain at all and has not had to use medication.  She was compliant with some of home exercise program.  Response to previous treatment: good  Functional change: less pain with mobility    Pain: 0/10 without medication for pain  Location: left knee      OBJECTIVE     N/a    Treatment     Lilian Dumont received the treatments listed below:      therapeutic exercises to develop strength and endurance for 30 minutes including:     Therapeutic Exercise Grid     Exercise 1  Exercise 2  Exercise 3  Exercise 4    Exercise :    L LE: SLR, VMO, sidelying hip abduction, adduction LAQ with TB, knee flexion with TB Sit to stands L step-ups   Repetition/Time :    15   15   15   15     Resist or Assist :    1.5#   green TB   10 lbs   8 inch box     Comment :                 Done :    yes  yes    yes   yes                     Exercise 5  Exercise 6  Exercise 7  Exercise 8    Exercise :    Heel raises, squats   Leg press NuStep   Step down with R LE leading on 4" step    Repetition/Time :    10   15   10 min   15     Resist or Assist :       50 lbs   L4        Comment :    Poor technique; stopped exercise to prevent injury      UE and LE        Done :    yes   yes   yes    yes                     Exercise 9  Exercise 10  Exercise 11  Exercise 12    Exercise :                  Repetition/Time :                  Resist or Assist :                  Comment :                  Done :                                  Patient Education and Home Exercises     Home " Exercises Provided and Patient Education Provided     Education provided: Educated pt on importance of HEP and encouraged pt to continue daily    ASSESSMENT     Pt had no difficulty during exercises and was able to complete 15 reps of almost all exercises even with 1.5# added to L LE.  No c/o pain after PT    Lilian Dumont Is progressing well towards her goals.   Pt prognosis is Good.     Pt will continue to benefit from skilled outpatient physical therapy to address the deficits listed in the problem list box on initial evaluation, provide pt/family education and to maximize pt's level of independence in the home and community environment.     Pt's spiritual, cultural and educational needs considered and pt agreeable to plan of care and goals.     Anticipated barriers to physical therapy: chronic knee pain from previous obesity     Goals:  Short Term Goals: 4 weeks      Pt will demonstrate knowledge and independence with HEP to continue with exercises outside of therapy --progressing     Reduce c/o pain in L knee to < 2/10 pain level with daily activities --progressing     Improve strength in L LE to 4/5 MMT throughout in order to improve tolerance with overall functional mobility --progressing     Long Term Goals: 6 weeks      Pt will improve score on KOS by 10 points which indicates improved ability to perform daily tasks with less difficulty and pain     Improve strength in L LE to 4+/5 MMT throughout in order to improve tolerance with overall functional mobility     Pt will reduce risk for falls and improve ability and safety with transfers as evidence by improved 5x sit to stands to < 12 secs    PLAN     Continue with current Plan of Care and progress per pt's tolerance    Sunni Almanza, PT

## 2022-09-21 ENCOUNTER — HISTORICAL (OUTPATIENT)
Dept: ADMINISTRATIVE | Facility: HOSPITAL | Age: 52
End: 2022-09-21
Payer: MEDICAID

## 2022-09-29 ENCOUNTER — DOCUMENTATION ONLY (OUTPATIENT)
Dept: REHABILITATION | Facility: HOSPITAL | Age: 52
End: 2022-09-29
Payer: MEDICAID

## 2022-09-29 NOTE — PROGRESS NOTES
[facility]  Physical Therapy Discharge Note    Name: Lilian Arroyo  Clinic Number: [id number]    Medical diagnosis: L knee pain  PT diagnosis: medial L knee pain, decreased L LE strength    Date of Last visit: 9/19/2022  Total Visits Received: 3 plus initial eval    Assessment      Lilian Arroyo did not return to physical therapy today for final assessment for discharge.  Lilian goals were address as listed below and met as stated.  Lilian was issued a home exercise program with handouts throughout this episode of care to reference for continued wellness and physical fitness . Contact information was given at evaluation in case any questions arise in the future or if therapy is needed.    Discharge reason: patient did not return for formal physical therapy. Pt called requesting discharge    Short Term Goals: 4 weeks      Pt will demonstrate knowledge and independence with HEP to continue with exercises outside of therapy- unknown     Reduce c/o pain in L knee to < 2/10 pain level with daily activities- unknown     Improve strength in L LE to 4/5 MMT throughout in order to improve tolerance with overall functional mobility- unknown     Long Term Goals: 6 weeks      Pt will improve score on KOS by 10 points which indicates improved ability to perform daily tasks with less difficulty and pain- unknown     Improve strength in L LE to 4+/5 MMT throughout in order to improve tolerance with overall functional mobility- unknown     Pt will reduce risk for falls and improve ability and safety with transfers as evidence by improved 5x sit to stands to < 12 secs- unknown     Plan   This patient is discharged from Physical Therapy.

## 2022-10-03 ENCOUNTER — OFFICE VISIT (OUTPATIENT)
Dept: FAMILY MEDICINE | Facility: CLINIC | Age: 52
End: 2022-10-03
Payer: MEDICAID

## 2022-10-03 VITALS
HEART RATE: 86 BPM | SYSTOLIC BLOOD PRESSURE: 126 MMHG | DIASTOLIC BLOOD PRESSURE: 84 MMHG | OXYGEN SATURATION: 98 % | BODY MASS INDEX: 35.93 KG/M2 | WEIGHT: 202.81 LBS | RESPIRATION RATE: 16 BRPM | HEIGHT: 63 IN

## 2022-10-03 DIAGNOSIS — G47.00 INSOMNIA, UNSPECIFIED TYPE: Primary | ICD-10-CM

## 2022-10-03 DIAGNOSIS — R07.9 NONSPECIFIC CHEST PAIN: ICD-10-CM

## 2022-10-03 DIAGNOSIS — K21.9 GASTROESOPHAGEAL REFLUX DISEASE, UNSPECIFIED WHETHER ESOPHAGITIS PRESENT: ICD-10-CM

## 2022-10-03 DIAGNOSIS — E27.8 ADRENAL NODULE: ICD-10-CM

## 2022-10-03 DIAGNOSIS — Z98.84 HISTORY OF ROUX-EN-Y GASTRIC BYPASS: ICD-10-CM

## 2022-10-03 PROCEDURE — 3008F BODY MASS INDEX DOCD: CPT | Mod: CPTII,,, | Performed by: PHYSICIAN ASSISTANT

## 2022-10-03 PROCEDURE — 3074F SYST BP LT 130 MM HG: CPT | Mod: CPTII,,, | Performed by: PHYSICIAN ASSISTANT

## 2022-10-03 PROCEDURE — 1160F PR REVIEW ALL MEDS BY PRESCRIBER/CLIN PHARMACIST DOCUMENTED: ICD-10-PCS | Mod: CPTII,,, | Performed by: PHYSICIAN ASSISTANT

## 2022-10-03 PROCEDURE — 1159F PR MEDICATION LIST DOCUMENTED IN MEDICAL RECORD: ICD-10-PCS | Mod: CPTII,,, | Performed by: PHYSICIAN ASSISTANT

## 2022-10-03 PROCEDURE — 3079F PR MOST RECENT DIASTOLIC BLOOD PRESSURE 80-89 MM HG: ICD-10-PCS | Mod: CPTII,,, | Performed by: PHYSICIAN ASSISTANT

## 2022-10-03 PROCEDURE — 1159F MED LIST DOCD IN RCRD: CPT | Mod: CPTII,,, | Performed by: PHYSICIAN ASSISTANT

## 2022-10-03 PROCEDURE — 3008F PR BODY MASS INDEX (BMI) DOCUMENTED: ICD-10-PCS | Mod: CPTII,,, | Performed by: PHYSICIAN ASSISTANT

## 2022-10-03 PROCEDURE — 1160F RVW MEDS BY RX/DR IN RCRD: CPT | Mod: CPTII,,, | Performed by: PHYSICIAN ASSISTANT

## 2022-10-03 PROCEDURE — 99214 PR OFFICE/OUTPT VISIT, EST, LEVL IV, 30-39 MIN: ICD-10-PCS | Mod: ,,, | Performed by: PHYSICIAN ASSISTANT

## 2022-10-03 PROCEDURE — 99214 OFFICE O/P EST MOD 30 MIN: CPT | Mod: ,,, | Performed by: PHYSICIAN ASSISTANT

## 2022-10-03 PROCEDURE — 3079F DIAST BP 80-89 MM HG: CPT | Mod: CPTII,,, | Performed by: PHYSICIAN ASSISTANT

## 2022-10-03 PROCEDURE — 3074F PR MOST RECENT SYSTOLIC BLOOD PRESSURE < 130 MM HG: ICD-10-PCS | Mod: CPTII,,, | Performed by: PHYSICIAN ASSISTANT

## 2022-10-03 RX ORDER — TRAZODONE HYDROCHLORIDE 50 MG/1
50 TABLET ORAL NIGHTLY
Qty: 30 TABLET | Refills: 0 | Status: SHIPPED | OUTPATIENT
Start: 2022-10-03 | End: 2023-07-25

## 2022-10-03 NOTE — PROGRESS NOTES
SUBJECTIVE:     History of Present Illness      Chief Complaint: Follow-up (2 month follow up visit to discuss lab results.  C/O insomnia.  Had stress test with cardiologist last week.)    HPI:  Patient is a 52 y.o. year old female who presents to clinic for 2 month follow up. PMH history of gastric bypass, acid reflux, and obesity.     Patient has been complaining of chronic epigastric abdominal/chest pain over the past couple of visits. History of Sreekanth-en-Y gastric bypass, esophageal dilation, hiatal hernia and GERD.  Overall symptoms have been present for the past year. At her last visit she was told to start Maalox 30 mL every 6 hours as needed for pain to see if this helped with her symptoms. Patient reports mild symptoms improvement with Maalox. She is also on pantoprazole 40 mg daily.     Patient saw [facility] beginning of September. Notes reviewed. According to notes EKG performed in office was normal. Patient was scheduled for nuclear stress test and echo. Patient follows up with cardiology 10/6/2022 to go over results.     She saw Bariatric surgeon Dr. Billings on 9/15/2022. Notes reviewed. According to note they are seeing her back after cardiology work up. Looks like she will be having esophogram and manometry within the next couple of weeks.     Patient was seen in ER for epigastric abdominal pain/chest pain and CT of the chest performed in ER did show moderate hiatal hernia with patulous fluid-filled distal esophagus. As well as a 19 mm intermediate hypodense left adrenal nodule but otherwise was fairly normal. We referred patient to Endocrinology at last visit. According to referral status patient should hear from Endocrinology by October 7th.     Patient also complaints of insomnia. Patient states symptoms have been present over the past couple of months. She has had this in the past. States she has both trouble falling and staying asleep. Denies racing thoughts and anciety. Denies chest pain,  palpitations, SOB, muscle weakness, hair loss, heat and cold intolerance. TSH performed on 9/12/2022 was normal at 0.8620 which is around her baseline level.      Review of Systems:  A comprehensive review of systems was performed and was negative except as noted in HPI     Previous History      Review of patient's allergies indicates:   Allergen Reactions    Aspirin      Other reaction(s): Bleeding  unable to take due to Gastric Bypass    Penicillin Rash     Past Medical History:   Diagnosis Date    Asthma     GERD (gastroesophageal reflux disease)     Hidradenitis axillaris     bilateral, had surgery on right    Insulin resistance      Current Outpatient Medications   Medication Instructions    albuterol (PROVENTIL/VENTOLIN HFA) 90 mcg/actuation inhaler inhale1 puff by mouth 4 times per day as needed for wheezing    aluminum-magnesium hydroxide-simethicone (MAALOX) 200-200-20 mg/5 mL Susp 30 mLs, Oral, Every 6 hours PRN    blood sugar diagnostic Strp To check BG 2  times daily, to use with insurance preferred meter    blood-glucose meter kit To check BG 2 times daily, to use with insurance preferred meter    diclofenac sodium (VOLTAREN) 1 % Gel apply 4 grams TO affected AREA EVERY 6 HOURS AS NEEDED FOR PAIN<BR>Strength: 1 %    docusate sodium (COLACE) 100 mg, Oral, 2 times daily    ergocalciferol (ERGOCALCIFEROL) 50,000 Units, Oral, Every 7 days    HUMIRA 40 mg, Subcutaneous    lancets (TRUEPLUS LANCETS) 30 gauge Misc USE TO check sugar TWICE DAILY    LIDOcaine (LIDODERM) 5 % apply 1 patch externally to skin every 12 hours as needed for pain then off for 12 hours    meclizine (ANTIVERT) 25 mg tablet TAKE ONE TABLET BY MOUTH THREE TIMES DAILY as needed for dizziness<BR>Strength: 25 mg    multivitamin with minerals tablet Oral    omeprazole (PRILOSEC) 10 MG capsule   1, Daily, 0 Refill(s), Start Date: 08/21/22 21:04:00 CDT    pantoprazole (PROTONIX) 40 MG tablet TAKE ONE TABLET DAILY BY MOUTH    traZODone (DESYREL)  "50 mg, Oral, Nightly    TRUE METRIX GLUCOSE TEST STRIP Strp test once daily    venlafaxine (EFFEXOR-XR) 150 mg, Oral, Daily    zinc gluconate 50 mg, Oral, Daily     Past Surgical History:   Procedure Laterality Date    AXILLARY HIDRADENITIS EXCISION  in 10/2018    Dr. Payan in [facility], right side, wound dehisced    BILATERAL TUBAL LIGATION       SECTION      CHOLECYSTECTOMY      COLONOSCOPY  2021    GASTRIC BYPASS      LAPAROSCOPIC GASTRIC BANDING      [place], [place]    TONSILLECTOMY       Family History   Problem Relation Age of Onset    Hypertension Mother     Diabetes Mother     Mitral valve prolapse Mother     Hypertension Father     Diabetes Father     Kidney cancer Father      Social History     Tobacco Use    Smoking status: Never    Smokeless tobacco: Never   Substance Use Topics    Alcohol use: Not Currently    Drug use: Never      Health Maintenance      Health Maintenance   Topic Date Due    TETANUS VACCINE  Never done    Mammogram  2023    Lipid Panel  2027    Hepatitis C Screening  Completed     OBJECTIVE:     Physical Exam      Vital Signs Reviewed   /84   Pulse 86   Resp 16   Ht 5' 3" (1.6 m)   Wt 92 kg (202 lb 12.8 oz)   LMP  (LMP Unknown)   SpO2 98%   BMI 35.92 kg/m²     Physical Exam:  General: Alert, well nourished, no acute distress, non-toxic appearing.   Eyes: Anicteric sclera, without conjunctival injection, normal lids, no purulent drainage, EOMs grossly intact.   Ears: No tragal tenderness. Tympanic membranes intact, pearly grey, without effusion or erythema and with a positive light reflex.   Mouth: Posterior pharynx without erythema. No exudate, ulcerations, or lesion. No tonsillar swelling.   Neck: Supple, full ROM, no rigidity, no cervical adenopathy.   Cardio: Normal rate and rhythm without murmurs, gallops, or rubs.   Resp: Respirations even and unlabored, clear to auscultation bilaterally.   Skin: No rashes or open lesions noted. "   MSK: No swelling. No abrasions or signs of trauma. Ambulating without assistance.   Neuro: CN1-12 intact grossly. No focal deficits noted. Facial expressions even.      Assessment/Plan      1. Insomnia, unspecified type   Reports insomnia over the past couple of months. She has had this in the past. Denies anxiety, racing thoughts, chest pain, palpitations, SOB, muscle weakness, hair loss, heat and cold intolerance. TSH stable.     Trazodone 50 mg at bed time.   Medication discussed with patient. Hand out given.   Discussed with patient medication may cause drowsiness and she should not drive, drink, or operate heavy machinery while taking. Patient expressed understanding.     Follow up in 4-5 weeks to discuss response to medication and further plan of care.      2. Nonspecific chest pain   Presents with reproducible chest pain and epigastric burning for the past year. History of Sreekanth-en-Y gastric bypass, esophageal dilation, hiatal hernia and GERD.      Saw Card[facility] beginning of September. Notes reviewed. According to notes EKG performed in office was normal. Patient was scheduled for nuclear stress test and echo. Patient follows up with cardiology 10/6/2022 to go over results.     Saw Bariatric surgeon Dr. Billings on 9/15/2022. Notes reviewed. According to note they are seeing her back after cardiology work up. Looks like she will be having esophogram and manometry within the next couple of weeks.     Relief with Maalox. Currently on pantoprazole 40 mg daily.      3. Gastroesophageal reflux disease, unspecified whether esophagitis present   Lifestyle modifications including avoidance of any food that may precipitate heartburn including alcohol, caffeine, chocolate, coffee, citrus fruits, onion, garlic, tomatoes, spicy foods, and high acidity foods.  Weight loss may help.  Elevate the head of the bed and avoid laying flat for 3-4 hours after meals.     4. History of Sreekanth-en-Y gastric bypass   Continue follow  up and recommendations per Gastric Bypass Dr. Billings.      5. Adrenal nodule   CT of the chest and abdomen performed in ER on 8/21/2022 showed a 19 mm intermediate hypodense left adrenal nodule but otherwise was fairly normal.     Referral to endocrinology sent at last visit. According to referral patient should be hearing in regards to referral by October 7th.      6.  Vitamin D deficiency  Continue Vitamin-D 19024 IU one time weekly times 12 weeks then continue over-the-counter vitamin-D supplementation 2000 IU daily.  Increase foods high in vitamin D and calcium such as milk, yogurt, cheese, dark leafy green vegetables, beans, oranges, almonds, cottage and cheese.        Orders Placed This Encounter    traZODone (DESYREL) 50 MG tablet      Medication List with Changes/Refills   New Medications    TRAZODONE (DESYREL) 50 MG TABLET    Take 1 tablet (50 mg total) by mouth every evening.   Current Medications    ADALIMUMAB (HUMIRA) 40 MG/0.8 ML SYKT INJECTION    Inject 40 mg into the skin.    ALBUTEROL (PROVENTIL/VENTOLIN HFA) 90 MCG/ACTUATION INHALER    inhale1 puff by mouth 4 times per day as needed for wheezing    ALUMINUM-MAGNESIUM HYDROXIDE-SIMETHICONE (MAALOX) 200-200-20 MG/5 ML SUSP    Take 30 mLs by mouth every 6 (six) hours as needed (Acid reflux).    BLOOD SUGAR DIAGNOSTIC STRP    To check BG 2  times daily, to use with insurance preferred meter    BLOOD-GLUCOSE METER KIT    To check BG 2 times daily, to use with insurance preferred meter    DICLOFENAC SODIUM (VOLTAREN) 1 % GEL    apply 4 grams TO affected AREA EVERY 6 HOURS AS NEEDED FOR PAIN  Strength: 1 %    DOCUSATE SODIUM (COLACE) 100 MG CAPSULE    Take 1 capsule (100 mg total) by mouth 2 (two) times daily.    ERGOCALCIFEROL (ERGOCALCIFEROL) 50,000 UNIT CAP    Take 50,000 Units by mouth every 7 days.    LANCETS (TRUEPLUS LANCETS) 30 GAUGE MISC    USE TO check sugar TWICE DAILY    LIDOCAINE (LIDODERM) 5 %    apply 1 patch externally to skin every 12 hours  as needed for pain then off for 12 hours    MECLIZINE (ANTIVERT) 25 MG TABLET    TAKE ONE TABLET BY MOUTH THREE TIMES DAILY as needed for dizziness  Strength: 25 mg    MULTIVITAMIN WITH MINERALS TABLET    Take by mouth.    OMEPRAZOLE (PRILOSEC) 10 MG CAPSULE      1, Daily, 0 Refill(s), Start Date: 08/21/22 21:04:00 CDT    PANTOPRAZOLE (PROTONIX) 40 MG TABLET    TAKE ONE TABLET DAILY BY MOUTH    TRUE METRIX GLUCOSE TEST STRIP STRP    test once daily    VENLAFAXINE (EFFEXOR-XR) 150 MG CP24    Take 150 mg by mouth once daily.    ZINC GLUCONATE 50 MG TABLET    Take 50 mg by mouth once daily.     Follow up in about 5 weeks (around 11/7/2022), or Discuss response to trazadone.    Elda Espinoza PA-C    Future Appointments   Date Time Provider Department Center   11/7/2022  8:40 AM [facility]   3/8/2023  3:00 PM [facility]   7/26/2023  1:30 PM Dickson Ayala MD [facility]

## 2023-03-03 ENCOUNTER — LAB VISIT (OUTPATIENT)
Dept: LAB | Facility: HOSPITAL | Age: 53
End: 2023-03-03
Attending: NURSE PRACTITIONER
Payer: MEDICAID

## 2023-03-03 DIAGNOSIS — Z09 ENCOUNTER FOR FOLLOW-UP: ICD-10-CM

## 2023-03-03 LAB
CHOLEST SERPL-MCNC: 195 MG/DL
CHOLEST/HDLC SERPL: 3 {RATIO} (ref 0–5)
HDLC SERPL-MCNC: 64 MG/DL (ref 35–60)
LDLC SERPL CALC-MCNC: 119 MG/DL (ref 50–140)
TRIGL SERPL-MCNC: 61 MG/DL (ref 37–140)
VLDLC SERPL CALC-MCNC: 12 MG/DL

## 2023-03-03 PROCEDURE — 36415 COLL VENOUS BLD VENIPUNCTURE: CPT

## 2023-03-03 PROCEDURE — 80061 LIPID PANEL: CPT

## 2023-03-04 ENCOUNTER — NURSE TRIAGE (OUTPATIENT)
Dept: ADMINISTRATIVE | Facility: CLINIC | Age: 53
End: 2023-03-04
Payer: MEDICAID

## 2023-03-04 ENCOUNTER — HOSPITAL ENCOUNTER (EMERGENCY)
Facility: HOSPITAL | Age: 53
Discharge: HOME | End: 2023-03-04
Attending: STUDENT IN AN ORGANIZED HEALTH CARE EDUCATION/TRAINING PROGRAM
Payer: MEDICAID

## 2023-03-04 VITALS
WEIGHT: 213 LBS | TEMPERATURE: 98 F | HEART RATE: 86 BPM | OXYGEN SATURATION: 94 % | HEIGHT: 63 IN | RESPIRATION RATE: 20 BRPM | SYSTOLIC BLOOD PRESSURE: 119 MMHG | BODY MASS INDEX: 37.74 KG/M2 | DIASTOLIC BLOOD PRESSURE: 78 MMHG

## 2023-03-04 DIAGNOSIS — K22.4 ESOPHAGEAL SPASM: ICD-10-CM

## 2023-03-04 LAB
ALBUMIN SERPL-MCNC: 3.7 G/DL (ref 3.5–5)
ALBUMIN/GLOB SERPL: 1.1 RATIO (ref 1.1–2)
ALP SERPL-CCNC: 89 UNIT/L (ref 40–150)
ALT SERPL-CCNC: 12 UNIT/L (ref 0–55)
AST SERPL-CCNC: 15 UNIT/L (ref 5–34)
BASOPHILS # BLD AUTO: 0.06 X10(3)/MCL (ref 0–0.2)
BASOPHILS NFR BLD AUTO: 0.7 %
BILIRUBIN DIRECT+TOT PNL SERPL-MCNC: 0.2 MG/DL
BUN SERPL-MCNC: 20.5 MG/DL (ref 9.8–20.1)
CALCIUM SERPL-MCNC: 8.8 MG/DL (ref 8.4–10.2)
CHLORIDE SERPL-SCNC: 107 MMOL/L (ref 98–107)
CO2 SERPL-SCNC: 27 MMOL/L (ref 22–29)
CREAT SERPL-MCNC: 0.59 MG/DL (ref 0.55–1.02)
EOSINOPHIL # BLD AUTO: 0.19 X10(3)/MCL (ref 0–0.9)
EOSINOPHIL NFR BLD AUTO: 2.2 %
ERYTHROCYTE [DISTWIDTH] IN BLOOD BY AUTOMATED COUNT: 12.9 % (ref 11.5–17)
GFR SERPLBLD CREATININE-BSD FMLA CKD-EPI: >60 MLS/MIN/1.73/M2
GLOBULIN SER-MCNC: 3.5 GM/DL (ref 2.4–3.5)
GLUCOSE SERPL-MCNC: 71 MG/DL (ref 74–100)
HCT VFR BLD AUTO: 37.4 % (ref 37–47)
HGB BLD-MCNC: 11.6 G/DL (ref 12–16)
IMM GRANULOCYTES # BLD AUTO: 0.01 X10(3)/MCL (ref 0–0.04)
IMM GRANULOCYTES NFR BLD AUTO: 0.1 %
LYMPHOCYTES # BLD AUTO: 4.03 X10(3)/MCL (ref 0.6–4.6)
LYMPHOCYTES NFR BLD AUTO: 45.6 %
MCH RBC QN AUTO: 26.7 PG
MCHC RBC AUTO-ENTMCNC: 31 G/DL (ref 33–36)
MCV RBC AUTO: 86.2 FL (ref 80–94)
MONOCYTES # BLD AUTO: 1.07 X10(3)/MCL (ref 0.1–1.3)
MONOCYTES NFR BLD AUTO: 12.1 %
NEUTROPHILS # BLD AUTO: 3.47 X10(3)/MCL (ref 2.1–9.2)
NEUTROPHILS NFR BLD AUTO: 39.3 %
PLATELET # BLD AUTO: 364 X10(3)/MCL (ref 130–400)
PMV BLD AUTO: 10.7 FL (ref 7.4–10.4)
POC CARDIAC TROPONIN I: 0 NG/ML (ref 0–0.08)
POTASSIUM SERPL-SCNC: 3.9 MMOL/L (ref 3.5–5.1)
PROT SERPL-MCNC: 7.2 GM/DL (ref 6.4–8.3)
RBC # BLD AUTO: 4.34 X10(6)/MCL (ref 4.2–5.4)
SAMPLE: NORMAL
SODIUM SERPL-SCNC: 143 MMOL/L (ref 136–145)
WBC # SPEC AUTO: 8.8 X10(3)/MCL (ref 4.5–11.5)

## 2023-03-04 PROCEDURE — 80053 COMPREHEN METABOLIC PANEL: CPT | Performed by: STUDENT IN AN ORGANIZED HEALTH CARE EDUCATION/TRAINING PROGRAM

## 2023-03-04 PROCEDURE — 96372 THER/PROPH/DIAG INJ SC/IM: CPT | Performed by: STUDENT IN AN ORGANIZED HEALTH CARE EDUCATION/TRAINING PROGRAM

## 2023-03-04 PROCEDURE — 99284 EMERGENCY DEPT VISIT MOD MDM: CPT | Mod: 25

## 2023-03-04 PROCEDURE — 85025 COMPLETE CBC W/AUTO DIFF WBC: CPT | Performed by: STUDENT IN AN ORGANIZED HEALTH CARE EDUCATION/TRAINING PROGRAM

## 2023-03-04 PROCEDURE — 25000003 PHARM REV CODE 250: Performed by: STUDENT IN AN ORGANIZED HEALTH CARE EDUCATION/TRAINING PROGRAM

## 2023-03-04 PROCEDURE — 84484 ASSAY OF TROPONIN QUANT: CPT

## 2023-03-04 PROCEDURE — 63600175 PHARM REV CODE 636 W HCPCS: Performed by: STUDENT IN AN ORGANIZED HEALTH CARE EDUCATION/TRAINING PROGRAM

## 2023-03-04 RX ORDER — HYOSCYAMINE SULFATE 0.12 MG/1
0.38 TABLET SUBLINGUAL
Status: COMPLETED | OUTPATIENT
Start: 2023-03-04 | End: 2023-03-04

## 2023-03-04 RX ORDER — LIDOCAINE HYDROCHLORIDE 20 MG/ML
15 SOLUTION OROPHARYNGEAL
Status: COMPLETED | OUTPATIENT
Start: 2023-03-04 | End: 2023-03-04

## 2023-03-04 RX ORDER — MAG HYDROX/ALUMINUM HYD/SIMETH 200-200-20
30 SUSPENSION, ORAL (FINAL DOSE FORM) ORAL
Status: COMPLETED | OUTPATIENT
Start: 2023-03-04 | End: 2023-03-04

## 2023-03-04 RX ORDER — DICYCLOMINE HYDROCHLORIDE 10 MG/ML
20 INJECTION INTRAMUSCULAR
Status: COMPLETED | OUTPATIENT
Start: 2023-03-04 | End: 2023-03-04

## 2023-03-04 RX ORDER — HYOSCYAMINE SULFATE 0.12 MG/1
0.12 TABLET SUBLINGUAL EVERY 4 HOURS PRN
Qty: 60 TABLET | Refills: 0 | Status: SHIPPED | OUTPATIENT
Start: 2023-03-04 | End: 2023-07-25

## 2023-03-04 RX ADMIN — HYOSCYAMINE SULFATE 0.38 MG: 0.12 TABLET ORAL at 05:03

## 2023-03-04 RX ADMIN — DICYCLOMINE HYDROCHLORIDE 20 MG: 20 INJECTION, SOLUTION INTRAMUSCULAR at 05:03

## 2023-03-04 RX ADMIN — ALUMINUM HYDROXIDE, MAGNESIUM HYDROXIDE, AND SIMETHICONE 30 ML: 200; 200; 20 SUSPENSION ORAL at 05:03

## 2023-03-04 RX ADMIN — LIDOCAINE HYDROCHLORIDE 15 ML: 20 SOLUTION ORAL; TOPICAL at 05:03

## 2023-03-04 NOTE — TELEPHONE ENCOUNTER
Reports feeling as if she is having esophogeal spasms and her heart is racing. Advised, per protocol. Verbalizes understanding but unsure she will follow this disposition as she feels this is spasms. Would like follow up in this regard.    Reason for Disposition   [1] Chest pain lasts > 5 minutes AND [2] age > 44    Protocols used: Chest Pain-A-AH

## 2023-03-04 NOTE — ED NOTES
Pt reports hx esophagus issues- achalasia- is scheduled to have heller myotomy laproscopic surgery  4/2023- here today for spasms- hx gastri c bypass surgery 2 years ago nad strted w theses issues after

## 2023-03-04 NOTE — ED PROVIDER NOTES
Encounter Date: 3/4/2023       History     Chief Complaint   Patient presents with    Medical Screening Exam     Pt reports having gastric bypass surgery x2 years ago. Pt reports over the past few months she has been having spasms to esophagus and epigastric region, seen by doctor and prescribed medication without relief. Pt reports she cannot sleep due to spasm, scheduled for procedure in April for condition.     52-year-old female s/p laparoscopic gastric bypass surgery (2020) w/ stricture s/p multiple EGD dilations (2021) and GJ anastomosis revision (2023)  presents with the current esophageal spasm today.  Patient states that he feels as a burning in her mid chest worsened at night.  Patient has had these symptoms since her initial gastric bypass 2 years ago, spasm is not worse but has not improved.  2023 she had an EGD with endoflip which showed a hiatal hernia and identified the likely cause of chest pain as diffuse esophageal spasm.Scheduled for hernia repair and myotomy on .Taking diltiazem 60 mg and sucralfate 10 ml TID without improvement of symptoms.      Review of patient's allergies indicates:   Allergen Reactions    Aspirin      Other reaction(s): Bleeding  unable to take due to Gastric Bypass    Penicillin Rash     Past Medical History:   Diagnosis Date    Asthma     GERD (gastroesophageal reflux disease)     Hidradenitis axillaris     bilateral, had surgery on right    Insulin resistance      Past Surgical History:   Procedure Laterality Date    AXILLARY HIDRADENITIS EXCISION  in 10/2018    Dr. Payan in [facility], right side, wound dehisced    BILATERAL TUBAL LIGATION       SECTION      CHOLECYSTECTOMY      COLONOSCOPY  2021    GASTRIC BYPASS      LAPAROSCOPIC GASTRIC BANDING      [place], [place]    TONSILLECTOMY       Family History   Problem Relation Age of Onset    Hypertension Mother     Diabetes Mother     Mitral valve prolapse Mother      Hypertension Father     Diabetes Father     Kidney cancer Father      Social History     Tobacco Use    Smoking status: Never    Smokeless tobacco: Never   Substance Use Topics    Alcohol use: Not Currently    Drug use: Never     Review of Systems   Constitutional:  Negative for fever.   HENT:  Negative for sore throat.    Respiratory:  Negative for shortness of breath.    Cardiovascular:  Positive for chest pain.   Gastrointestinal:  Negative for nausea.        Neg except as stated in HPI   Genitourinary:  Negative for dysuria.   Musculoskeletal:  Negative for back pain.   Skin:  Negative for rash.   Neurological:  Negative for weakness.   Hematological:  Does not bruise/bleed easily.     Physical Exam     Initial Vitals [03/04/23 1650]   BP Pulse Resp Temp SpO2   119/78 86 20 98.1 °F (36.7 °C) (!) 94 %      MAP       --         Physical Exam    Constitutional: She appears well-developed and well-nourished.   obese   HENT:   Head: Normocephalic.   Eyes: EOM are normal. Pupils are equal, round, and reactive to light.   Neck:   Normal range of motion.  Cardiovascular:  Normal rate, regular rhythm, normal heart sounds, intact distal pulses and normal pulses.           Pulmonary/Chest: Breath sounds normal. No respiratory distress. She exhibits tenderness (reproducible to mid sternum).   Abdominal: Abdomen is soft. Bowel sounds are normal. There is abdominal tenderness (mild to epigastrum).   Musculoskeletal:         General: Normal range of motion.      Cervical back: Normal range of motion.     Neurological: She is alert and oriented to person, place, and time. GCS score is 15. GCS eye subscore is 4. GCS verbal subscore is 5. GCS motor subscore is 6.   Skin: Skin is warm. Capillary refill takes less than 2 seconds.   Psychiatric: She has a normal mood and affect.       ED Course   Procedures  Labs Reviewed   COMPREHENSIVE METABOLIC PANEL - Abnormal; Notable for the following components:       Result Value    Glucose  Level 71 (*)     Blood Urea Nitrogen 20.5 (*)     All other components within normal limits   CBC WITH DIFFERENTIAL - Abnormal; Notable for the following components:    Hgb 11.6 (*)     MCHC 31.0 (*)     MPV 10.7 (*)     All other components within normal limits   CBC W/ AUTO DIFFERENTIAL    Narrative:     The following orders were created for panel order CBC Auto Differential.  Procedure                               Abnormality         Status                     ---------                               -----------         ------                     CBC with Differential[[id number]]        Abnormal            Final result                 Please view results for these tests on the individual orders.   TROPONIN ISTAT   POCT TROPONIN     EKG Readings: (Independently Interpreted)   Initial Reading: No STEMI. Rhythm: Normal Sinus Rhythm. Heart Rate: 87. Ectopy: No Ectopy. Conduction: Normal.     Imaging Results              X-Ray Chest 1 View (Final result)  Result time 03/04/23 18:24:45      Final result by Geovanny Jane MD (03/04/23 18:24:45)                   Impression:      NO ACUTE CARDIOPULMONARY PROCESS IDENTIFIED.      Electronically signed by: Geovanny Jane  Date:    03/04/2023  Time:    18:24               Narrative:    EXAMINATION:  XR CHEST 1 VIEW    CLINICAL HISTORY:  Dyskinesia of esophagus    TECHNIQUE:  One view    COMPARISON:  September 30, 2021.    FINDINGS:  Cardiopericardial silhouette is within normal limits.  Obscured hilar and the mediastinal structures.  Overall hilar appearance is similar compared to previous radiograph.  Lungs are without dense focal or segmental consolidation, congestive process, pleural effusions or pneumothorax.                                       Medications   aluminum-magnesium hydroxide-simethicone 200-200-20 mg/5 mL suspension 30 mL (30 mLs Oral Given 3/4/23 1747)   LIDOcaine HCl 2% oral solution 15 mL (15 mLs Oral Given 3/4/23 1747)   dicyclomine injection 20 mg (20  mg Intramuscular Given 3/4/23 1747)   hyoscyamine ODT 0.375 mg (0.375 mg Oral Given 3/4/23 1747)     Medical Decision Making:   Differential Diagnosis:   GERD, esophageal spasm, gastroenteritis, costochondritis, cardiomyopathy, diverticulitis  ED Management:  Patient given GI cocktail with dicyclomine injection and hyoscyamine for esophageal spasm  Counseled that due to chronic nature of her problem she alternates surgery for a permanent solution and any medications are only going to temporarily improve her symptoms.  Ultimately needs follow up with bariatric surgeon again for possible move up of surgery date if symptoms worsen.  Patien signed out to Dr. Ballesteros at 6:00 p.m.      Pt had improved, ready for discharge                         Clinical Impression:   Final diagnoses:  [K22.4] Esophageal spasm               Jassi Ballesteros MD  03/04/23 0571

## 2023-03-05 NOTE — DISCHARGE INSTRUCTIONS
Follow up with your surgeon within 1 week     Take all meds as prescribed     As we discussed you may increase your diltiazem to 4 times daily     Return to ed if symptoms worsen

## 2023-03-08 ENCOUNTER — OFFICE VISIT (OUTPATIENT)
Dept: FAMILY MEDICINE | Facility: CLINIC | Age: 53
End: 2023-03-08
Payer: MEDICAID

## 2023-03-08 VITALS
SYSTOLIC BLOOD PRESSURE: 94 MMHG | DIASTOLIC BLOOD PRESSURE: 62 MMHG | BODY MASS INDEX: 38.09 KG/M2 | OXYGEN SATURATION: 96 % | WEIGHT: 215 LBS | RESPIRATION RATE: 18 BRPM | HEART RATE: 96 BPM | HEIGHT: 63 IN

## 2023-03-08 DIAGNOSIS — K21.9 GASTROESOPHAGEAL REFLUX DISEASE WITH HIATAL HERNIA: ICD-10-CM

## 2023-03-08 DIAGNOSIS — Z00.00 WELLNESS EXAMINATION: Primary | ICD-10-CM

## 2023-03-08 DIAGNOSIS — K22.4 ESOPHAGEAL SPASM: ICD-10-CM

## 2023-03-08 DIAGNOSIS — K21.9 GASTROESOPHAGEAL REFLUX DISEASE, UNSPECIFIED WHETHER ESOPHAGITIS PRESENT: ICD-10-CM

## 2023-03-08 DIAGNOSIS — K44.9 GASTROESOPHAGEAL REFLUX DISEASE WITH HIATAL HERNIA: ICD-10-CM

## 2023-03-08 PROCEDURE — 3078F PR MOST RECENT DIASTOLIC BLOOD PRESSURE < 80 MM HG: ICD-10-PCS | Mod: CPTII,,, | Performed by: NURSE PRACTITIONER

## 2023-03-08 PROCEDURE — 3008F BODY MASS INDEX DOCD: CPT | Mod: CPTII,,, | Performed by: NURSE PRACTITIONER

## 2023-03-08 PROCEDURE — 3008F PR BODY MASS INDEX (BMI) DOCUMENTED: ICD-10-PCS | Mod: CPTII,,, | Performed by: NURSE PRACTITIONER

## 2023-03-08 PROCEDURE — 3074F PR MOST RECENT SYSTOLIC BLOOD PRESSURE < 130 MM HG: ICD-10-PCS | Mod: CPTII,,, | Performed by: NURSE PRACTITIONER

## 2023-03-08 PROCEDURE — 3078F DIAST BP <80 MM HG: CPT | Mod: CPTII,,, | Performed by: NURSE PRACTITIONER

## 2023-03-08 PROCEDURE — 3074F SYST BP LT 130 MM HG: CPT | Mod: CPTII,,, | Performed by: NURSE PRACTITIONER

## 2023-03-08 PROCEDURE — 99396 PREV VISIT EST AGE 40-64: CPT | Mod: ,,, | Performed by: NURSE PRACTITIONER

## 2023-03-08 PROCEDURE — 99396 PR PREVENTIVE VISIT,EST,40-64: ICD-10-PCS | Mod: ,,, | Performed by: NURSE PRACTITIONER

## 2023-03-08 PROCEDURE — 1159F MED LIST DOCD IN RCRD: CPT | Mod: CPTII,,, | Performed by: NURSE PRACTITIONER

## 2023-03-08 PROCEDURE — 1159F PR MEDICATION LIST DOCUMENTED IN MEDICAL RECORD: ICD-10-PCS | Mod: CPTII,,, | Performed by: NURSE PRACTITIONER

## 2023-03-08 NOTE — PROGRESS NOTES
SUBJECTIVE:     History of Present Illness      Chief Complaint: Follow-up (Wellness exam with lab results. Pt had ED visit on 3/4/23 for spasms in chest. Pt states she is having surgery in April 2023 in [place].)    HPI:  Patient is a 52 y.o. year old female who presents to clinic for for annual wellness exam.    Patient has history of laparoscopic gastric bypass and multiple EGD dilations GJ revision.    She is still suffering with esophageal spasms.    She states that she has appointment April 27  with Dr. Billings  in [place] for esophageal hernia repair surgery.  Patient still complains of minimal relief with GI cocktail Carafate      Review of Systems:    Review of Systems    12 point review of systems conducted, negative except as stated in the history of present illness. See HPI for details.     Previous History      Review of patient's allergies indicates:   Allergen Reactions    Aspirin      Other reaction(s): Bleeding  unable to take due to Gastric Bypass    Penicillin Rash       Past Medical History:   Diagnosis Date    Arthritis     Asthma     Esophageal spasm     Esophageal spasm     GERD (gastroesophageal reflux disease)     Hidradenitis axillaris     bilateral, had surgery on right    Insulin resistance      Current Outpatient Medications   Medication Instructions    albuterol (PROVENTIL/VENTOLIN HFA) 90 mcg/actuation inhaler inhale1 puff by mouth 4 times per day as needed for wheezing    aluminum-magnesium hydroxide-simethicone (MAALOX) 200-200-20 mg/5 mL Susp 30 mLs, Oral, Every 6 hours PRN    blood-glucose meter kit To check BG 2 times daily, to use with insurance preferred meter    diclofenac sodium (VOLTAREN) 1 % Gel apply 4 grams TO affected AREA EVERY 6 HOURS AS NEEDED FOR PAIN<BR>Strength: 1 %    docusate sodium (COLACE) 100 MG capsule TAKE ONE CAPSULE BY MOUTH TWICE DAILY    ergocalciferol (ERGOCALCIFEROL) 50,000 Units, Oral, Every 7 days    HUMIRA 40 mg, Subcutaneous    hyoscyamine  "(LEVSIN/SL) 0.125 mg, Sublingual, Every 4 hours PRN    lancets (TRUEPLUS LANCETS) 30 gauge Misc USE TO check sugar TWICE DAILY    meclizine (ANTIVERT) 25 mg tablet TAKE ONE TABLET BY MOUTH THREE TIMES DAILY as needed for dizziness<BR>Strength: 25 mg    multivitamin with minerals tablet Oral    omeprazole (PRILOSEC) 10 MG capsule   1, Daily, 0 Refill(s), Start Date: 22 21:04:00 CDT    pantoprazole (PROTONIX) 40 MG tablet TAKE ONE TABLET DAILY BY MOUTH    traZODone (DESYREL) 50 mg, Oral, Nightly    TRUE METRIX GLUCOSE TEST STRIP Strp test once daily    TRUE METRIX GLUCOSE TEST STRIP Strp USE TO check sugar TWICE DAILY    venlafaxine (EFFEXOR-XR) 150 mg, Oral, Daily    zinc gluconate 50 mg, Oral, Daily     Past Surgical History:   Procedure Laterality Date    AXILLARY HIDRADENITIS EXCISION  in 10/2018    Dr. Payan in [facility], right side, wound dehisced    BILATERAL TUBAL LIGATION       SECTION      CHOLECYSTECTOMY      COLONOSCOPY  2021    GASTRIC BYPASS      LAPAROSCOPIC GASTRIC BANDING      [place], [place]    TONSILLECTOMY       Family History   Problem Relation Age of Onset    Hypertension Mother     Diabetes Mother     Mitral valve prolapse Mother     Hypertension Father     Diabetes Father     Kidney cancer Father        Social History     Tobacco Use    Smoking status: Never    Smokeless tobacco: Never   Substance Use Topics    Alcohol use: Yes     Comment: occasionally    Drug use: Never        Health Maintenance      Health Maintenance   Topic Date Due    TETANUS VACCINE  Never done    Mammogram  2023    Lipid Panel  2028    Hepatitis C Screening  Completed       OBJECTIVE:     Physical Exam      Vital Signs Reviewed   Visit Vitals  BP 94/62   Pulse 96   Resp 18   Ht 5' 3" (1.6 m)   Wt 97.5 kg (215 lb)   LMP  (LMP Unknown)   SpO2 96%   BMI 38.09 kg/m²       Physical Exam    Physical Exam:  General: Alert, well nourished, no acute distress, non-toxic appearing.   Eyes: " Anicteric sclera, without conjunctival injection, normal lids, no purulent drainage, EOMs grossly intact.   Ears: No tragal tenderness. Tympanic membranes intact, pearly grey, without effusion or erythema and with a positive light reflex.   Mouth: Posterior pharynx without erythema. No exudate, ulcerations, or lesion. No tonsillar swelling.   Neck: Supple, full ROM, no rigidity, no cervical adenopathy.   Cardio: Normal rate and rhythm    Resp: Respirations even and unlabored, clear to auscultation bilaterally.   Abd: No ecchymosis or distension. Normal bowel sounds in all 4 quadrants. No tenderness to palpation. No rebound tenderness or guarding. No CVA tenderness.   Skin: No rashes or open lesions noted.   MSK: No swelling. No abrasions or signs of trauma. Ambulating without assistance.   Neuro: Alert,oriented No focal deficits noted. Facial expressions even.   Psych: Cooperative, Normal affect      Procedures    Procedures     Labs     Results for orders placed or performed during the hospital encounter of 03/04/23   Comprehensive metabolic panel   Result Value Ref Range    Sodium Level 143 136 - 145 mmol/L    Potassium Level 3.9 3.5 - 5.1 mmol/L    Chloride 107 98 - 107 mmol/L    Carbon Dioxide 27 22 - 29 mmol/L    Glucose Level 71 (L) 74 - 100 mg/dL    Blood Urea Nitrogen 20.5 (H) 9.8 - 20.1 mg/dL    Creatinine 0.59 0.55 - 1.02 mg/dL    Calcium Level Total 8.8 8.4 - 10.2 mg/dL    Protein Total 7.2 6.4 - 8.3 gm/dL    Albumin Level 3.7 3.5 - 5.0 g/dL    Globulin 3.5 2.4 - 3.5 gm/dL    Albumin/Globulin Ratio 1.1 1.1 - 2.0 ratio    Bilirubin Total 0.2 <=1.5 mg/dL    Alkaline Phosphatase 89 40 - 150 unit/L    Alanine Aminotransferase 12 0 - 55 unit/L    Aspartate Aminotransferase 15 5 - 34 unit/L    eGFR >60 mls/min/1.73/m2   CBC with Differential   Result Value Ref Range    WBC 8.8 4.5 - 11.5 x10(3)/mcL    RBC 4.34 4.20 - 5.40 x10(6)/mcL    Hgb 11.6 (L) 12.0 - 16.0 g/dL    Hct 37.4 37.0 - 47.0 %    MCV 86.2 80.0 -  94.0 fL    MCH 26.7 pg    MCHC 31.0 (L) 33.0 - 36.0 g/dL    RDW 12.9 11.5 - 17.0 %    Platelet 364 130 - 400 x10(3)/mcL    MPV 10.7 (H) 7.4 - 10.4 fL    Neut % 39.3 %    Lymph % 45.6 %    Mono % 12.1 %    Eos % 2.2 %    Basophil % 0.7 %    Lymph # 4.03 0.6 - 4.6 x10(3)/mcL    Neut # 3.47 2.1 - 9.2 x10(3)/mcL    Mono # 1.07 0.1 - 1.3 x10(3)/mcL    Eos # 0.19 0 - 0.9 x10(3)/mcL    Baso # 0.06 0 - 0.2 x10(3)/mcL    IG# 0.01 0 - 0.04 x10(3)/mcL    IG% 0.1 %   Troponin ISTAT   Result Value Ref Range    POC Cardiac Troponin I 0.00 0.00 - 0.08 ng/mL    Sample unknown        Chemistry:  Lab Results   Component Value Date     03/04/2023    K 3.9 03/04/2023    CHLORIDE 107 03/04/2023    BUN 20.5 (H) 03/04/2023    CREATININE 0.59 03/04/2023    EGFRNORACEVR >60 03/04/2023    GLUCOSE 71 (L) 03/04/2023    CALCIUM 8.8 03/04/2023    ALKPHOS 89 03/04/2023    LABPROT 7.2 03/04/2023    ALBUMIN 3.7 03/04/2023    BILIDIR 0.2 03/16/2022    IBILI 0.20 03/16/2022    AST 15 03/04/2023    ALT 12 03/04/2023    MG 2.1 08/21/2018    CITCNHFZ68IQ 28.9 (L) 09/12/2022    TSH 0.8620 09/12/2022        Lab Results   Component Value Date    HGBA1C 5.1 09/12/2022        Hematology:  Lab Results   Component Value Date    WBC 8.8 03/04/2023    HGB 11.6 (L) 03/04/2023    HCT 37.4 03/04/2023     03/04/2023       Lipid Panel:  Lab Results   Component Value Date    CHOL 195 03/03/2023    HDL 64 (H) 03/03/2023    .00 03/03/2023    TRIG 61 03/03/2023    TOTALCHOLEST 3 03/03/2023        Urine:  Lab Results   Component Value Date    APPEARANCEUA Clear 04/29/2022    PHUA 5.5 08/21/2018    PROTEINUA Negative 04/29/2022    LEUKOCYTESUR Negative 04/29/2022    RBCUA None Seen 04/29/2022    WBCUA 2-3 04/29/2022    BACTERIA Rare 04/29/2022         Assessment       1. Wellness examination    2. Gastroesophageal reflux disease, unspecified whether esophagitis present    3. Esophageal spasm    4. Gastroesophageal reflux disease with hiatal hernia            Plan       1. Wellness examination  Discussed labs and preventative screenings   Overall health status reviewed.    Significant chronic conditions addressed, including ongoing treatment plans.   Good health habits reinforced.    Cardiovascular disease risk factors discussed.   Appropriate recommendations and preventative care medical   information provided with annual wellness exams encouraged.  Vaccination status     Colon to the left colon screen.  In 21  PSA  Cervical up-to-date patient has appointment with gyn in July    2. Gastroesophageal reflux disease, unspecified whether esophagitis present  Lifestyle modifications and avoidance of food any precipitating heartburn:    Alcohol, caffeine, chocolate, coffee, citrus foods, onions, garlic, tomato   based foods, peppermint and spicy food.   Weight loss, smoking cessation, elevate head of bed and avoiding lying flat for at least 3 to 4 hours after meals   Patient is pending surgery for hiatal hernia and esophageal  3. Esophageal spasm  Appointment with Dr. Billings in [place].  Will gyn hernia surgery  4. Gastroesophageal reflux disease with hiatal hernia        Medication List with Changes/Refills   Current Medications    ADALIMUMAB (HUMIRA) 40 MG/0.8 ML SYKT INJECTION    Inject 40 mg into the skin.    ALBUTEROL (PROVENTIL/VENTOLIN HFA) 90 MCG/ACTUATION INHALER    inhale1 puff by mouth 4 times per day as needed for wheezing    ALUMINUM-MAGNESIUM HYDROXIDE-SIMETHICONE (MAALOX) 200-200-20 MG/5 ML SUSP    Take 30 mLs by mouth every 6 (six) hours as needed (Acid reflux).    BLOOD-GLUCOSE METER KIT    To check BG 2 times daily, to use with insurance preferred meter    DICLOFENAC SODIUM (VOLTAREN) 1 % GEL    apply 4 grams TO affected AREA EVERY 6 HOURS AS NEEDED FOR PAIN  Strength: 1 %    DOCUSATE SODIUM (COLACE) 100 MG CAPSULE    TAKE ONE CAPSULE BY MOUTH TWICE DAILY    ERGOCALCIFEROL (ERGOCALCIFEROL) 50,000 UNIT CAP    Take 50,000 Units by mouth every 7 days.     HYOSCYAMINE (LEVSIN/SL) 0.125 MG SUBL    Place 1 tablet (0.125 mg total) under the tongue every 4 (four) hours as needed (esophageal spasm).    LANCETS (TRUEPLUS LANCETS) 30 GAUGE MISC    USE TO check sugar TWICE DAILY    MECLIZINE (ANTIVERT) 25 MG TABLET    TAKE ONE TABLET BY MOUTH THREE TIMES DAILY as needed for dizziness  Strength: 25 mg    MULTIVITAMIN WITH MINERALS TABLET    Take by mouth.    OMEPRAZOLE (PRILOSEC) 10 MG CAPSULE      1, Daily, 0 Refill(s), Start Date: 08/21/22 21:04:00 CDT    PANTOPRAZOLE (PROTONIX) 40 MG TABLET    TAKE ONE TABLET DAILY BY MOUTH    TRAZODONE (DESYREL) 50 MG TABLET    Take 1 tablet (50 mg total) by mouth every evening.    TRUE METRIX GLUCOSE TEST STRIP STRP    test once daily    TRUE METRIX GLUCOSE TEST STRIP STRP    USE TO check sugar TWICE DAILY    VENLAFAXINE (EFFEXOR-XR) 150 MG CP24    Take 150 mg by mouth once daily.    ZINC GLUCONATE 50 MG TABLET    Take 50 mg by mouth once daily.       No follow-ups on file.     Future Appointments   Date Time Provider Department Center   3/8/2023  3:00 PM [facility]   7/26/2023  1:30 PM Dickson Ayala MD [facility]

## 2023-03-28 ENCOUNTER — LAB VISIT (OUTPATIENT)
Dept: LAB | Facility: HOSPITAL | Age: 53
End: 2023-03-28
Attending: INTERNAL MEDICINE
Payer: MEDICAID

## 2023-03-28 DIAGNOSIS — Z86.010 PERSONAL HISTORY OF COLONIC POLYPS: ICD-10-CM

## 2023-03-28 DIAGNOSIS — Z12.11 SPECIAL SCREENING FOR MALIGNANT NEOPLASMS, COLON: Primary | ICD-10-CM

## 2023-03-28 DIAGNOSIS — M25.562 LEFT MEDIAL KNEE PAIN: ICD-10-CM

## 2023-03-28 DIAGNOSIS — K20.90 ESOPHAGITIS: ICD-10-CM

## 2023-03-28 DIAGNOSIS — M62.81 MUSCLE WEAKNESS OF LOWER EXTREMITY: ICD-10-CM

## 2023-03-28 LAB
HEMOCCULT SP1 STL QL: NEGATIVE
HEMOCCULT SP2 STL QL: POSITIVE
HEMOCCULT SP3 STL QL: NEGATIVE

## 2023-03-28 PROCEDURE — 82270 OCCULT BLOOD FECES: CPT

## 2023-04-05 ENCOUNTER — HOSPITAL ENCOUNTER (EMERGENCY)
Facility: HOSPITAL | Age: 53
Discharge: HOME | End: 2023-04-06
Attending: EMERGENCY MEDICINE
Payer: MEDICAID

## 2023-04-05 DIAGNOSIS — R07.9 ACUTE CHEST PAIN: ICD-10-CM

## 2023-04-05 DIAGNOSIS — R07.89 BURNING CHEST PAIN: ICD-10-CM

## 2023-04-05 DIAGNOSIS — K22.4 ESOPHAGEAL SPASM: Primary | ICD-10-CM

## 2023-04-05 LAB — TROPONIN I SERPL-MCNC: <0.01 NG/ML (ref 0–0.04)

## 2023-04-05 PROCEDURE — 25000003 PHARM REV CODE 250: Performed by: NURSE PRACTITIONER

## 2023-04-05 PROCEDURE — 80053 COMPREHEN METABOLIC PANEL: CPT | Performed by: EMERGENCY MEDICINE

## 2023-04-05 PROCEDURE — 93010 EKG 12-LEAD: ICD-10-PCS | Mod: ,,, | Performed by: INTERNAL MEDICINE

## 2023-04-05 PROCEDURE — 93010 ELECTROCARDIOGRAM REPORT: CPT | Mod: ,,, | Performed by: INTERNAL MEDICINE

## 2023-04-05 PROCEDURE — 63600175 PHARM REV CODE 636 W HCPCS: Mod: JZ,JG | Performed by: NURSE PRACTITIONER

## 2023-04-05 PROCEDURE — 93005 ELECTROCARDIOGRAM TRACING: CPT

## 2023-04-05 PROCEDURE — 83690 ASSAY OF LIPASE: CPT | Performed by: EMERGENCY MEDICINE

## 2023-04-05 PROCEDURE — 96372 THER/PROPH/DIAG INJ SC/IM: CPT | Performed by: NURSE PRACTITIONER

## 2023-04-05 PROCEDURE — 99285 EMERGENCY DEPT VISIT HI MDM: CPT | Mod: 25

## 2023-04-05 PROCEDURE — 84484 ASSAY OF TROPONIN QUANT: CPT | Performed by: NURSE PRACTITIONER

## 2023-04-05 RX ORDER — LIDOCAINE HYDROCHLORIDE 20 MG/ML
15 SOLUTION OROPHARYNGEAL ONCE
Status: COMPLETED | OUTPATIENT
Start: 2023-04-05 | End: 2023-04-05

## 2023-04-05 RX ORDER — MAG HYDROX/ALUMINUM HYD/SIMETH 200-200-20
30 SUSPENSION, ORAL (FINAL DOSE FORM) ORAL ONCE
Status: COMPLETED | OUTPATIENT
Start: 2023-04-05 | End: 2023-04-05

## 2023-04-05 RX ORDER — GLUCAGON 1 MG
1 KIT INJECTION
Status: COMPLETED | OUTPATIENT
Start: 2023-04-05 | End: 2023-04-05

## 2023-04-05 RX ADMIN — GLUCAGON 1 MG: KIT at 08:04

## 2023-04-05 RX ADMIN — LIDOCAINE HYDROCHLORIDE 15 ML: 20 SOLUTION ORAL at 08:04

## 2023-04-05 RX ADMIN — ALUMINUM HYDROXIDE, MAGNESIUM HYDROXIDE, AND SIMETHICONE 30 ML: 200; 200; 20 SUSPENSION ORAL at 08:04

## 2023-04-06 VITALS
BODY MASS INDEX: 37.74 KG/M2 | TEMPERATURE: 99 F | SYSTOLIC BLOOD PRESSURE: 122 MMHG | HEART RATE: 85 BPM | DIASTOLIC BLOOD PRESSURE: 73 MMHG | WEIGHT: 213 LBS | OXYGEN SATURATION: 95 % | RESPIRATION RATE: 20 BRPM | HEIGHT: 63 IN

## 2023-04-06 LAB
ALBUMIN SERPL-MCNC: 4.2 G/DL (ref 3.5–5)
ALBUMIN/GLOB SERPL: 1 RATIO (ref 1.1–2)
ALP SERPL-CCNC: 103 UNIT/L (ref 40–150)
ALT SERPL-CCNC: 12 UNIT/L (ref 0–55)
AST SERPL-CCNC: 17 UNIT/L (ref 5–34)
BASOPHILS # BLD AUTO: 0.06 X10(3)/MCL (ref 0–0.2)
BASOPHILS NFR BLD AUTO: 0.5 %
BILIRUBIN DIRECT+TOT PNL SERPL-MCNC: 0.3 MG/DL
BUN SERPL-MCNC: 23.8 MG/DL (ref 9.8–20.1)
CALCIUM SERPL-MCNC: 9.5 MG/DL (ref 8.4–10.2)
CHLORIDE SERPL-SCNC: 107 MMOL/L (ref 98–107)
CO2 SERPL-SCNC: 22 MMOL/L (ref 22–29)
CREAT SERPL-MCNC: 0.71 MG/DL (ref 0.55–1.02)
EOSINOPHIL # BLD AUTO: 0.14 X10(3)/MCL (ref 0–0.9)
EOSINOPHIL NFR BLD AUTO: 1.3 %
ERYTHROCYTE [DISTWIDTH] IN BLOOD BY AUTOMATED COUNT: 13 % (ref 11.5–17)
GFR SERPLBLD CREATININE-BSD FMLA CKD-EPI: >60 MLS/MIN/1.73/M2
GLOBULIN SER-MCNC: 4.1 GM/DL (ref 2.4–3.5)
GLUCOSE SERPL-MCNC: 78 MG/DL (ref 74–100)
HCT VFR BLD AUTO: 37.6 % (ref 37–47)
HGB BLD-MCNC: 12.3 G/DL (ref 12–16)
IMM GRANULOCYTES # BLD AUTO: 0.03 X10(3)/MCL (ref 0–0.04)
IMM GRANULOCYTES NFR BLD AUTO: 0.3 %
LIPASE SERPL-CCNC: 36 U/L
LYMPHOCYTES # BLD AUTO: 3.98 X10(3)/MCL (ref 0.6–4.6)
LYMPHOCYTES NFR BLD AUTO: 36.4 %
MCH RBC QN AUTO: 28 PG (ref 27–31)
MCHC RBC AUTO-ENTMCNC: 32.7 G/DL (ref 33–36)
MCV RBC AUTO: 85.6 FL (ref 80–94)
MONOCYTES # BLD AUTO: 1.03 X10(3)/MCL (ref 0.1–1.3)
MONOCYTES NFR BLD AUTO: 9.4 %
NEUTROPHILS # BLD AUTO: 5.69 X10(3)/MCL (ref 2.1–9.2)
NEUTROPHILS NFR BLD AUTO: 52.1 %
NRBC BLD AUTO-RTO: 0 %
PLATELET # BLD AUTO: 366 X10(3)/MCL (ref 130–400)
PMV BLD AUTO: 11.2 FL (ref 7.4–10.4)
POTASSIUM SERPL-SCNC: 4.1 MMOL/L (ref 3.5–5.1)
PROT SERPL-MCNC: 8.3 GM/DL (ref 6.4–8.3)
RBC # BLD AUTO: 4.39 X10(6)/MCL (ref 4.2–5.4)
SODIUM SERPL-SCNC: 140 MMOL/L (ref 136–145)
WBC # SPEC AUTO: 10.9 X10(3)/MCL (ref 4.5–11.5)

## 2023-04-06 PROCEDURE — 25000242 PHARM REV CODE 250 ALT 637 W/ HCPCS: Performed by: EMERGENCY MEDICINE

## 2023-04-06 PROCEDURE — 63600175 PHARM REV CODE 636 W HCPCS: Performed by: EMERGENCY MEDICINE

## 2023-04-06 PROCEDURE — 96372 THER/PROPH/DIAG INJ SC/IM: CPT | Performed by: EMERGENCY MEDICINE

## 2023-04-06 PROCEDURE — 85025 COMPLETE CBC W/AUTO DIFF WBC: CPT | Performed by: EMERGENCY MEDICINE

## 2023-04-06 RX ORDER — DICYCLOMINE HYDROCHLORIDE 10 MG/ML
20 INJECTION INTRAMUSCULAR
Status: COMPLETED | OUTPATIENT
Start: 2023-04-06 | End: 2023-04-06

## 2023-04-06 RX ORDER — NITROGLYCERIN 0.4 MG/1
0.4 TABLET SUBLINGUAL
Status: COMPLETED | OUTPATIENT
Start: 2023-04-06 | End: 2023-04-06

## 2023-04-06 RX ORDER — NITROGLYCERIN 0.4 MG/1
0.4 TABLET SUBLINGUAL 2 TIMES DAILY PRN
Qty: 20 TABLET | Refills: 0 | Status: SHIPPED | OUTPATIENT
Start: 2023-04-06 | End: 2024-01-18

## 2023-04-06 RX ADMIN — NITROGLYCERIN 0.4 MG: 0.4 TABLET, ORALLY DISINTEGRATING SUBLINGUAL at 12:04

## 2023-04-06 RX ADMIN — DICYCLOMINE HYDROCHLORIDE 20 MG: 20 INJECTION, SOLUTION INTRAMUSCULAR at 12:04

## 2023-04-06 NOTE — ED PROVIDER NOTES
"Encounter Date: 2023       History     Chief Complaint   Patient presents with    Achalasia     Pt c/o esophageal/chest "spasms" has extensive GI sx (esophageal dilation, bypass) pt has a Heller myotomy scheduled w/ Dr. Billings in  on . States the spasms are constant, and the pain is no different or worse than normal, states she could not "take the pain anymore" Pt takes hycosamine and carafate, states it does not help the pain. Denies SOB.      53 y/o F presents to the emergency department for evaluation esophageal spasms.  Reports that she has a history of the same, symptoms have been worsening of late, scheduled for a Heller myotomy with Dr. Billings in [place] in 3 weeks; however, pain not controlled with her home diltiazem, hyoscyamine and Carafate. She denies any change in the quality of her symptoms. No associated vomiting, diaphoresis or shortness of breath. No cardiac history.     The history is provided by the patient and medical records.   Illness   The current episode started several weeks ago. The problem occurs continuously. The problem has been unchanged. Nothing relieves the symptoms. Nothing aggravates the symptoms. Pertinent negatives include no fever, no abdominal pain, no nausea, no vomiting and no shortness of breath.   Review of patient's allergies indicates:   Allergen Reactions    Aspirin      Other reaction(s): Bleeding  unable to take due to Gastric Bypass    Penicillin Rash     Past Medical History:   Diagnosis Date    Arthritis     Asthma     Esophageal spasm     Esophageal spasm     GERD (gastroesophageal reflux disease)     Hidradenitis axillaris     bilateral, had surgery on right    Insulin resistance      Past Surgical History:   Procedure Laterality Date    AXILLARY HIDRADENITIS EXCISION  in 10/2018    Dr. Payan in [facility], right side, wound dehisced    BILATERAL TUBAL LIGATION       SECTION      CHOLECYSTECTOMY      COLONOSCOPY  2021    GASTRIC BYPASS      " LAPAROSCOPIC GASTRIC BANDING  2006    [place], [place]    TONSILLECTOMY       Family History   Problem Relation Age of Onset    Hypertension Mother     Diabetes Mother     Mitral valve prolapse Mother     Hypertension Father     Diabetes Father     Kidney cancer Father      Social History     Tobacco Use    Smoking status: Never    Smokeless tobacco: Never   Substance Use Topics    Alcohol use: Yes     Comment: occasionally    Drug use: Never     Review of Systems   Constitutional:  Negative for fever.   Respiratory:  Negative for shortness of breath.    Cardiovascular:  Positive for chest pain.   Gastrointestinal:  Negative for abdominal pain, nausea and vomiting.   Musculoskeletal:  Negative for back pain.   Neurological:  Negative for syncope.   Psychiatric/Behavioral:  Positive for sleep disturbance.    All other systems reviewed and are negative.    Physical Exam     Initial Vitals [04/05/23 2035]   BP Pulse Resp Temp SpO2   (!) 155/119 99 20 98.6 °F (37 °C) 98 %      MAP       --         Physical Exam    Nursing note and vitals reviewed.  Constitutional: She appears well-developed and well-nourished. No distress.   HENT:   Head: Normocephalic and atraumatic.   Mouth/Throat: Oropharynx is clear and moist.   Eyes: Conjunctivae are normal. Pupils are equal, round, and reactive to light.   Neck: Neck supple. No JVD present.   Normal range of motion.  Cardiovascular:  Normal rate and regular rhythm.           Pulmonary/Chest: No stridor. No respiratory distress.   Abdominal: Abdomen is soft. She exhibits no distension. There is no abdominal tenderness.   Musculoskeletal:         General: No edema.      Cervical back: Normal range of motion and neck supple.     Neurological: She is alert and oriented to person, place, and time. She has normal strength. GCS score is 15. GCS eye subscore is 4. GCS verbal subscore is 5. GCS motor subscore is 6.   Skin: Skin is warm and dry.       ED Course   Procedures  Labs Reviewed    COMPREHENSIVE METABOLIC PANEL - Abnormal; Notable for the following components:       Result Value    Blood Urea Nitrogen 23.8 (*)     Globulin 4.1 (*)     Albumin/Globulin Ratio 1.0 (*)     All other components within normal limits   CBC WITH DIFFERENTIAL - Abnormal; Notable for the following components:    MCHC 32.7 (*)     MPV 11.2 (*)     All other components within normal limits   TROPONIN I - Normal   LIPASE - Normal   CBC W/ AUTO DIFFERENTIAL    Narrative:     The following orders were created for panel order CBC auto differential.  Procedure                               Abnormality         Status                     ---------                               -----------         ------                     CBC with Differential[[id number]]        Abnormal            Final result                 Please view results for these tests on the individual orders.     EKG Readings: (Independently Interpreted)   Initial Reading: No STEMI. Rhythm: Sinus Tachycardia. Heart Rate: 102. ST Segments: Normal ST Segments. T Waves: Normal. Axis: Normal.   04/05/2023 @ 2033     ECG Results              EKG 12-lead (Final result)  Result time 04/06/23 06:25:32      Final result by Interface, Lab In [facility] (04/06/23 06:25:32)                   Narrative:    Test Reason : R07.89,    Vent. Rate : 102 BPM     Atrial Rate : 102 BPM     P-R Int : 152 ms          QRS Dur : 074 ms      QT Int : 348 ms       P-R-T Axes : 030 018 050 degrees     QTc Int : 453 ms    Sinus tachycardia  Otherwise normal ECG  When compared with ECG of 04-MAR-2023 16:50,  No significant change was found  Confirmed by Alhaji Joseph MD ([id number]) on 4/6/2023 6:25:23 AM    Referred By:             Confirmed By:Alhaji Joseph MD                                     EKG 12-LEAD (Final result)  Result time 04/13/23 10:58:33      Final result by Unknown User (04/13/23 10:58:33)                                      Imaging Results              X-Ray Chest 1 View (Final result)   Result time 04/05/23 21:42:33      Final result by Tomas Li MD (04/05/23 21:42:33)                   Impression:      No acute cardiopulmonary process.      Electronically signed by: Tomas Li  Date:    04/05/2023  Time:    21:42               Narrative:    EXAMINATION:  XR CHEST 1 VIEW    CLINICAL HISTORY:  esophageal spasms;    TECHNIQUE:  Single view of the chest    COMPARISON:  03/04/2023    FINDINGS:  No focal opacification, pleural effusion, or pneumothorax.    The cardiomediastinal silhouette remains prominent.    No acute osseous abnormality.                                       Medications   glucagon (human recombinant) injection 1 mg (1 mg Intramuscular Given 4/5/23 2045)   aluminum-magnesium hydroxide-simethicone 200-200-20 mg/5 mL suspension 30 mL (30 mLs Oral Given 4/5/23 2045)     And   LIDOcaine HCl 2% oral solution 15 mL (15 mLs Oral Given 4/5/23 2045)   nitroGLYCERIN SL tablet 0.4 mg (0.4 mg Sublingual Given 4/6/23 0042)   dicyclomine injection 20 mg (20 mg Intramuscular Given 4/6/23 0042)   Medical Decision Making  Problems Addressed:  Acute chest pain: acute illness or injury  Burning chest pain: acute illness or injury  Esophageal spasm: chronic illness or injury with exacerbation, progression, or side effects of treatment      ED assessment:    Ms. Arroyo has a history of esophageal spasm pending surgical intervention for the same.  No relief with her home medications.  Afebrile, nontoxic appearing, hemodynamically stable.  No abdominal tenderness.  Denies any vomiting.  No exertional symptoms.  No significant change in her symptoms accept that they are now unrelieved with her home medications.    Differential diagnosis (including but not limited to):   Esophageal spasm, GERD, PUD, esophagitis, gastritis, atypical ACS, acute hepatobiliary disease (e.g. cholelithiasis, cholecystitis, choledocholithiasis, cholangitis), pancreatitis    ED management:   Medications given in triage  (glucagon, GI cocktail) w/o significant improvement. Cardiac enzymes obtained by SORT provider negative - no indication of MI. Given now intractable symptoms, consideration of alternate pathology as above, LFTs, bili labs, lipase obtained w/ no clinically significant acute findings. EKG w/ no ischemic change. Given additional attempted symptomatic treatment w/ SL nitroglycerin and bentyl with significant improvement in her symptoms. 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   I've discussed the reassuring work up with the patient and she is comfortable with discharge home. As below, prescribed additional symptomatic therapies and advised to keep all scheduled follow up and procedural appointments. .     My independent radiology interpretation:   CXR: no focal infiltrate or effusion, no pneumothorax or pneumomediastinum    Amount and/or Complexity of Data Reviewed  Independent historian: none   Summary of history:   External data reviewed: notes from previous ED visits and notes from clinic visits  Summary of data reviewed: prior ED visits for same, relief w/ dicyclomine and GI cocktail, negative labs at that time. Scheduled w/ Dr. Jassi Billings for Heller Myotomy 4/26/2023.   Risk and benefits of testing: discussed   Labs: ordered and reviewed  Radiology: ordered and independent interpretation performed (see above or ED course)  ECG/medicine tests: ordered and independent interpretation performed (see above or ED course)      Risk  Prescription drug management   Shared decision making     Critical Care  none    I, Kamilah Zavaleta MD personally performed the history, PE, MDM, and procedures as documented above and agree with the scribe's documentation.                 ED Course as of 05/05/23 1319   Thu Apr 06, 2023   0130 Ms. Arroyo is feeling improved at this time. Will rx SL nitroglycerin to be used PRN. Advised not to take more than 1 in any setting as can contribute to lower  Bps, etc. Instructed to keep all follow up appointments including planned surgery. ED return precautions reviewed at the bedside and provided in the written discharge instructions. All questions answered to the best of my ability.  [KS]   0207 EKG reviewed. Sinus tachycardia but otherwise no acute findings.  [KS]      ED Course User Index  [KS] Kamilah Zavaleta MD                 Clinical Impression:   Final diagnoses:  [R07.89] Burning chest pain  [R07.9] Acute chest pain  [K22.4] Esophageal spasm (Primary)        ED Disposition Condition    Discharge Stable          ED Prescriptions       Medication Sig Dispense Start Date End Date Auth. Provider    nitroGLYCERIN (NITROSTAT) 0.4 MG SL tablet  Place 1 tablet (0.4 mg total) under the tongue 2 (two) times daily as needed for Chest pain (severe esophageal spasm). 20 tablet 4/6/2023 4/16/2023 Kamilah Zavaleta MD          Follow-up Information       Follow up With Specialties Details Why Contact Info    Jassi Billings MD General Surgery, Bariatrics  keep scheduled appointments [address] [facility] [address]  [phone]      [facility] - Emergency Dept Emergency Medicine  As needed, If symptoms worsen [address] [facility] [id number] [phone]             Kamilah Zavaleta MD  05/05/23 6124

## 2023-04-06 NOTE — FIRST PROVIDER EVALUATION
Medical screening examination initiated.  I have conducted a focused provider triage encounter, findings are as follows:    Brief history of present illness:  53 y/o with long history of GI issues with esophageal dilations, gastric bypass and another GJ anastamosis presents with chest pain but then describes it as esophageal spasms which she gets at times but it has been bad lately. She is on hycosamine, diltiazem but nothing is helping. No vomiting.     There were no vitals filed for this visit.    Pertinent physical exam:  alert, nonlabored, ambulatory     Brief workup plan:  labs, ekg, imaging, meds    Preliminary workup initiated; this workup will be continued and followed by the physician or advanced practice provider that is assigned to the patient when roomed.

## 2023-05-07 ENCOUNTER — HOSPITAL ENCOUNTER (EMERGENCY)
Facility: HOSPITAL | Age: 53
Discharge: HOME | End: 2023-05-07
Attending: STUDENT IN AN ORGANIZED HEALTH CARE EDUCATION/TRAINING PROGRAM
Payer: MEDICAID

## 2023-05-07 VITALS
OXYGEN SATURATION: 96 % | BODY MASS INDEX: 37.56 KG/M2 | HEART RATE: 98 BPM | SYSTOLIC BLOOD PRESSURE: 141 MMHG | RESPIRATION RATE: 18 BRPM | DIASTOLIC BLOOD PRESSURE: 88 MMHG | WEIGHT: 212 LBS | TEMPERATURE: 98 F | HEIGHT: 63 IN

## 2023-05-07 DIAGNOSIS — K59.00 CONSTIPATION, UNSPECIFIED CONSTIPATION TYPE: Primary | ICD-10-CM

## 2023-05-07 DIAGNOSIS — K59.00 CONSTIPATION: ICD-10-CM

## 2023-05-07 PROCEDURE — 96372 THER/PROPH/DIAG INJ SC/IM: CPT | Performed by: STUDENT IN AN ORGANIZED HEALTH CARE EDUCATION/TRAINING PROGRAM

## 2023-05-07 PROCEDURE — 63600175 PHARM REV CODE 636 W HCPCS: Performed by: STUDENT IN AN ORGANIZED HEALTH CARE EDUCATION/TRAINING PROGRAM

## 2023-05-07 PROCEDURE — 99284 EMERGENCY DEPT VISIT MOD MDM: CPT

## 2023-05-07 RX ORDER — POLYETHYLENE GLYCOL 3350 17 G/17G
17 POWDER, FOR SOLUTION ORAL 2 TIMES DAILY
Qty: 102 G | Refills: 0 | Status: SHIPPED | OUTPATIENT
Start: 2023-05-07 | End: 2023-05-10

## 2023-05-07 RX ADMIN — METHYLNALTREXONE BROMIDE 12 MG: 12 INJECTION, SOLUTION SUBCUTANEOUS at 02:05

## 2023-05-07 NOTE — ED PROVIDER NOTES
Encounter Date: 2023       History     Chief Complaint   Patient presents with    Fecal Impaction     Patient reports that she unable to have a bowel movement. She can feel the feces in her rectum. She put on a glove and tried to get it out but said it was hard. She reports she had surgery (heller myotomy) recently in [place] and was started on pain medication. She not sure when her last bowel movement was, she think beginning of the week. She took milk of magnesia around 0030 and 2330; fleets enema around 2100     Patient is a 52-year-old white female past medical history of recent gastric bypass procedure who presented to the emergency room constipation.  Patient states she is been on Percocets since the procedure 2 weeks ago.  Patient states she is not had a bowel movement in the last 2 days.  Patient states that she earlier felt the urge to have a bowel movement but did not thus presented to the emergency room.  Patient states she was prescribed a red pill called Colace but she did not know why it was prescribed and thus did not take.  Patient states she took some milk of magnesia later tonight as well as a Fleet's enema with no urge to defecate.  Patient denies any abdominal pain, nausea vomiting.  Patient denies any other complaints.    Review of patient's allergies indicates:   Allergen Reactions    Aspirin      Other reaction(s): Bleeding  unable to take due to Gastric Bypass    Penicillin Rash     Past Medical History:   Diagnosis Date    Arthritis     Asthma     Esophageal spasm     Esophageal spasm     GERD (gastroesophageal reflux disease)     Hidradenitis axillaris     bilateral, had surgery on right    Insulin resistance      Past Surgical History:   Procedure Laterality Date    AXILLARY HIDRADENITIS EXCISION  in 10/2018    Dr. Payan in [facility], right side, wound dehisced    BILATERAL TUBAL LIGATION       SECTION      CHOLECYSTECTOMY      COLONOSCOPY  2021    GASTRIC BYPASS       LAPAROSCOPIC GASTRIC BANDING  2006    [place], [place]    TONSILLECTOMY       Family History   Problem Relation Age of Onset    Hypertension Mother     Diabetes Mother     Mitral valve prolapse Mother     Hypertension Father     Diabetes Father     Kidney cancer Father      Social History     Tobacco Use    Smoking status: Never    Smokeless tobacco: Never   Substance Use Topics    Alcohol use: Yes     Comment: occasionally    Drug use: Never     Review of Systems   Constitutional:  Negative for chills, fatigue and fever.   HENT:  Negative for congestion, sore throat and trouble swallowing.    Eyes:  Negative for pain and visual disturbance.   Respiratory:  Negative for cough, shortness of breath and wheezing.    Cardiovascular:  Negative for chest pain and palpitations.   Gastrointestinal:  Positive for constipation. Negative for abdominal pain, blood in stool, diarrhea, nausea and vomiting.   Genitourinary:  Negative for dysuria and hematuria.   Musculoskeletal:  Negative for back pain and myalgias.   Skin:  Negative for rash and wound.   Neurological:  Negative for seizures, syncope and headaches.   Psychiatric/Behavioral:  Negative for confusion. The patient is not nervous/anxious.      Physical Exam     Initial Vitals [05/07/23 0128]   BP Pulse Resp Temp SpO2   133/87 107 18 98.4 °F (36.9 °C) 96 %      MAP       --         Physical Exam    Nursing note and vitals reviewed.  Constitutional: She appears well-developed and well-nourished. She is not diaphoretic. No distress.   HENT:   Head: Normocephalic.   Right Ear: External ear normal.   Left Ear: External ear normal.   Nose: Nose normal.   Eyes: Conjunctivae and EOM are normal. Right eye exhibits no discharge. Left eye exhibits no discharge. No scleral icterus.   Neck:   Normal range of motion.  Cardiovascular:  Normal rate, regular rhythm and normal heart sounds.     Exam reveals no gallop and no friction rub.       No murmur heard.  Pulmonary/Chest:  Breath sounds normal. No stridor. No respiratory distress. She has no wheezes. She has no rhonchi. She has no rales.   Abdominal: Abdomen is soft. She exhibits no distension. There is no abdominal tenderness. There is no rebound.   Musculoskeletal:         General: Normal range of motion.      Cervical back: Normal range of motion.     Neurological: She is alert.   Skin: Skin is warm. No rash noted. No erythema.   Psychiatric: She has a normal mood and affect. Her behavior is normal.       ED Course   Procedures  Labs Reviewed - No data to display       Imaging Results              X-Ray Abdomen Flat And Erect (In process)  Result time 05/07/23 01:39:35                  X-Rays:   Independently Interpreted Readings:   Abdomen: No air fluid levels or signs of obstruction.  No free air under diaphragm. Moderate stool burden   Medications   methylnaltrexone 12 mg/0.6 mL subcutaneous injection 12 mg (12 mg Subcutaneous Given 5/7/23 0220)     Medical Decision Making:   Initial Assessment:   No acute distress 52-year-old female  Differential Diagnosis:   Constipation, small-bowel obstruction, ileus  Clinical Tests:   Radiological Study: Ordered and Reviewed  ED Management:  Vital signs stable patient is afebrile   Abdominal exam is soft and nontender diffusely   Lack of bowel movement in 2 days   Patient has tried minimal options and continues to take opioids   Patient states she will run out for opioids today and thus she was stopped taking  Relistor given here in the emergency room   Offer patient enema versus disimpaction manually versus conservative medication management after discussion with her she elected for conservative medication management  Glycolax b.i.d. for 4 days sent pharmacy  Advised fiber in diet and to be compliant with her Colace   All questions answered in layman's terms  Return precautions discussed and follow up with PCP is recommended                        Clinical Impression:   Final  diagnoses:  [K59.00] Constipation  [K59.00] Constipation, unspecified constipation type (Primary)        ED Disposition Condition    Discharge Stable          ED Prescriptions       Medication Sig Dispense Start Date End Date Auth. Provider    polyethylene glycol (GLYCOLAX) 17 gram/dose powder Take 17 g by mouth 2 (two) times daily. for 3 days 102 g 5/7/2023 5/10/2023 Adelfo Stack MD          Follow-up Information       Follow up With Specialties Details Why Contact Info    [facility] - Emergency Dept Emergency Medicine  If symptoms worsen [address] [facility] [address] [phone]    [facility] Schedule an appointment as soon as possible for a visit   [address] [facility] [address] [phone]               Adelfo Stack MD  05/07/23 8238

## 2023-05-10 ENCOUNTER — TELEPHONE (OUTPATIENT)
Dept: URGENT CARE | Facility: CLINIC | Age: 53
End: 2023-05-10
Payer: MEDICAID

## 2023-05-10 NOTE — TELEPHONE ENCOUNTER
Attempted to call patient back. No answer & unable to leave a VM due to not being given the option to do so.

## 2023-05-10 NOTE — TELEPHONE ENCOUNTER
Pt : 1970, pt called clinic and states she had surgery on  for a hernia repair and esophagus due to spasms. She went to hospital in [place] on  due to constipation and can't get anything out. She states she was given a powder laxative (didn't know name) for   the constipation and was informed to take 2x a day for 3 days, some relief. Now she states that it feels like hard marbles, can't go at all and having abdominal cramps. She also states she has a doctor's appointment tomorrow in [place]. She wants to know on what to do. She wants to speak to a nurse on some recommendations. Phone Number: [phone]. Please advise......

## 2023-05-10 NOTE — TELEPHONE ENCOUNTER
We cannot give advice since we did not see her recently.  She needs to call the nurse hotline or her primary MD office.

## 2023-05-11 ENCOUNTER — TELEPHONE (OUTPATIENT)
Dept: FAMILY MEDICINE | Facility: CLINIC | Age: 53
End: 2023-05-11
Payer: MEDICAID

## 2023-05-11 NOTE — TELEPHONE ENCOUNTER
Called pt's telephone, no answer. Called pt's  Des to f/u regarding message. He reports he is not with pt at present. Pt's  reported that pt went to ED on last night. He did not have additional information regarding her visit. He confirmed pt was d/c'd from ED. Will attempt to f/u with pt.       ----- Message from Dalia Kim sent at 5/10/2023 11:08 AM CDT -----  Regarding: Constipation  .Type:  Needs Medical Advice    Who Called: pt    Symptoms (please be specific): constipation   How long has patient had these symptoms:  couple of days  Pharmacy name and phone #:    Would the patient rather a call back or a response via [facility]?   Best Call Back Number: [phone]  Additional Information: please call to advise - pt refuse nurse hotline

## 2023-05-30 DIAGNOSIS — E16.2 LOW BLOOD SUGAR READING: ICD-10-CM

## 2023-05-30 DIAGNOSIS — Z86.39 H/O DIABETES MELLITUS: ICD-10-CM

## 2023-05-31 RX ORDER — CALCIUM CITRATE/VITAMIN D3 200MG-6.25
TABLET ORAL
Qty: 100 EACH | Refills: 0 | Status: SHIPPED | OUTPATIENT
Start: 2023-05-31 | End: 2023-08-07

## 2023-06-18 ENCOUNTER — HOSPITAL ENCOUNTER (EMERGENCY)
Facility: HOSPITAL | Age: 53
Discharge: LEFT BEFORE DISCHARGE | End: 2023-06-18
Payer: MEDICAID

## 2023-06-18 ENCOUNTER — NURSE TRIAGE (OUTPATIENT)
Dept: ADMINISTRATIVE | Facility: CLINIC | Age: 53
End: 2023-06-18
Payer: MEDICAID

## 2023-06-18 VITALS
DIASTOLIC BLOOD PRESSURE: 72 MMHG | BODY MASS INDEX: 37.21 KG/M2 | RESPIRATION RATE: 18 BRPM | TEMPERATURE: 98 F | OXYGEN SATURATION: 96 % | WEIGHT: 210 LBS | HEIGHT: 63 IN | HEART RATE: 100 BPM | SYSTOLIC BLOOD PRESSURE: 101 MMHG

## 2023-06-18 DIAGNOSIS — R07.9 CHEST PAIN: ICD-10-CM

## 2023-06-18 PROCEDURE — 93005 ELECTROCARDIOGRAM TRACING: CPT

## 2023-06-18 PROCEDURE — 99900041 HC LEFT WITHOUT BEING SEEN- EMERGENCY

## 2023-06-18 PROCEDURE — 93010 ELECTROCARDIOGRAM REPORT: CPT | Mod: ,,, | Performed by: INTERNAL MEDICINE

## 2023-06-18 PROCEDURE — 93010 EKG 12-LEAD: ICD-10-PCS | Mod: ,,, | Performed by: INTERNAL MEDICINE

## 2023-06-18 NOTE — TELEPHONE ENCOUNTER
Reason for Disposition   Passed out (i.e., lost consciousness, collapsed and was not responding)    Additional Information   Negative: SEVERE difficulty breathing (e.g., struggling for each breath, speaks in single words)   Negative: Difficult to awaken or acting confused (e.g., disoriented, slurred speech)   Negative: Shock suspected (e.g., cold/pale/clammy skin, too weak to stand, low BP, rapid pulse)    Protocols used: Chest Pain-A-AH  Spouse called re pt had gastric bypass surgery couple years ago. had revision surgery. Was having CP so  did another surgery 7 weeks ago where  cut muscle around esophagus. Pt having CP in middle of breast today, no SOB. no nausea. no heavy perspiration , pt states she feels like she might pass out. Rec EMS. Caller states pt also having insomnia. Urgent office message sent to

## 2023-06-19 NOTE — TELEPHONE ENCOUNTER
Called pt's  Des to f/u on pt. He reports that pt went to ER on previous day d/t chest pains but pt left ER due to the waiting time. He reports that pt is currently asleep. Advised pt's  to return to ER if pt c/o chest pains.

## 2023-06-29 ENCOUNTER — HOSPITAL ENCOUNTER (EMERGENCY)
Facility: HOSPITAL | Age: 53
Discharge: HOME | End: 2023-06-29
Attending: SPECIALIST
Payer: MEDICAID

## 2023-06-29 VITALS
TEMPERATURE: 98 F | HEART RATE: 99 BPM | OXYGEN SATURATION: 96 % | BODY MASS INDEX: 37.74 KG/M2 | WEIGHT: 213 LBS | RESPIRATION RATE: 16 BRPM | DIASTOLIC BLOOD PRESSURE: 83 MMHG | HEIGHT: 63 IN | SYSTOLIC BLOOD PRESSURE: 122 MMHG

## 2023-06-29 DIAGNOSIS — A08.4 VIRAL GASTROENTERITIS: Primary | ICD-10-CM

## 2023-06-29 DIAGNOSIS — K57.92 DIVERTICULITIS: ICD-10-CM

## 2023-06-29 LAB
ALBUMIN SERPL-MCNC: 3.7 G/DL (ref 3.5–5)
ALBUMIN/GLOB SERPL: 1 RATIO (ref 1.1–2)
ALP SERPL-CCNC: 95 UNIT/L (ref 40–150)
ALT SERPL-CCNC: 15 UNIT/L (ref 0–55)
APPEARANCE UR: CLEAR
AST SERPL-CCNC: 19 UNIT/L (ref 5–34)
BACTERIA #/AREA URNS AUTO: ABNORMAL /HPF
BASOPHILS # BLD AUTO: 0.04 X10(3)/MCL
BASOPHILS NFR BLD AUTO: 0.3 %
BILIRUB UR QL STRIP.AUTO: NEGATIVE MG/DL
BILIRUBIN DIRECT+TOT PNL SERPL-MCNC: 0.2 MG/DL
BUN SERPL-MCNC: 22.9 MG/DL (ref 9.8–20.1)
CALCIUM SERPL-MCNC: 8.8 MG/DL (ref 8.4–10.2)
CHLORIDE SERPL-SCNC: 109 MMOL/L (ref 98–107)
CO2 SERPL-SCNC: 26 MMOL/L (ref 22–29)
COLOR UR: YELLOW
CREAT SERPL-MCNC: 0.66 MG/DL (ref 0.55–1.02)
EOSINOPHIL # BLD AUTO: 0.27 X10(3)/MCL (ref 0–0.9)
EOSINOPHIL NFR BLD AUTO: 2.2 %
ERYTHROCYTE [DISTWIDTH] IN BLOOD BY AUTOMATED COUNT: 13.8 % (ref 11.5–17)
GFR SERPLBLD CREATININE-BSD FMLA CKD-EPI: >60 MLS/MIN/1.73/M2
GLOBULIN SER-MCNC: 3.6 GM/DL (ref 2.4–3.5)
GLUCOSE SERPL-MCNC: 103 MG/DL (ref 74–100)
GLUCOSE UR QL STRIP.AUTO: NEGATIVE MG/DL
HCT VFR BLD AUTO: 37.8 % (ref 37–47)
HGB BLD-MCNC: 11.1 G/DL (ref 12–16)
HYALINE CASTS URNS QL MICRO: ABNORMAL /LPF
IMM GRANULOCYTES # BLD AUTO: 0.02 X10(3)/MCL (ref 0–0.04)
IMM GRANULOCYTES NFR BLD AUTO: 0.2 %
KETONES UR QL STRIP.AUTO: NEGATIVE MG/DL
LEUKOCYTE ESTERASE UR QL STRIP.AUTO: NEGATIVE UNIT/L
LIPASE SERPL-CCNC: 45 U/L
LYMPHOCYTES # BLD AUTO: 3.5 X10(3)/MCL (ref 0.6–4.6)
LYMPHOCYTES NFR BLD AUTO: 28.8 %
MCH RBC QN AUTO: 25.5 PG (ref 27–31)
MCHC RBC AUTO-ENTMCNC: 29.4 G/DL (ref 33–36)
MCV RBC AUTO: 86.7 FL (ref 80–94)
MONOCYTES # BLD AUTO: 1.08 X10(3)/MCL (ref 0.1–1.3)
MONOCYTES NFR BLD AUTO: 8.9 %
NEUTROPHILS # BLD AUTO: 7.25 X10(3)/MCL (ref 2.1–9.2)
NEUTROPHILS NFR BLD AUTO: 59.6 %
NITRITE UR QL STRIP.AUTO: NEGATIVE
PH UR STRIP.AUTO: 5.5 [PH]
PLATELET # BLD AUTO: 348 X10(3)/MCL (ref 130–400)
PMV BLD AUTO: 10.2 FL (ref 7.4–10.4)
POTASSIUM SERPL-SCNC: 3.3 MMOL/L (ref 3.5–5.1)
PROT SERPL-MCNC: 7.3 GM/DL (ref 6.4–8.3)
PROT UR QL STRIP.AUTO: NEGATIVE MG/DL
RBC # BLD AUTO: 4.36 X10(6)/MCL (ref 4.2–5.4)
RBC #/AREA URNS AUTO: ABNORMAL /HPF
RBC UR QL AUTO: ABNORMAL UNIT/L
SODIUM SERPL-SCNC: 144 MMOL/L (ref 136–145)
SP GR UR STRIP.AUTO: 1.02
SQUAMOUS #/AREA URNS AUTO: ABNORMAL /HPF
UROBILINOGEN UR STRIP-ACNC: 0.2 MG/DL
WBC # SPEC AUTO: 12.16 X10(3)/MCL (ref 4.5–11.5)
WBC #/AREA URNS AUTO: ABNORMAL /HPF

## 2023-06-29 PROCEDURE — 85025 COMPLETE CBC W/AUTO DIFF WBC: CPT | Performed by: SPECIALIST

## 2023-06-29 PROCEDURE — 25000003 PHARM REV CODE 250: Performed by: SPECIALIST

## 2023-06-29 PROCEDURE — 80053 COMPREHEN METABOLIC PANEL: CPT | Performed by: SPECIALIST

## 2023-06-29 PROCEDURE — 99283 EMERGENCY DEPT VISIT LOW MDM: CPT

## 2023-06-29 PROCEDURE — 83690 ASSAY OF LIPASE: CPT | Performed by: SPECIALIST

## 2023-06-29 PROCEDURE — 81001 URINALYSIS AUTO W/SCOPE: CPT | Performed by: SPECIALIST

## 2023-06-29 RX ORDER — ONDANSETRON 4 MG/1
4 TABLET, ORALLY DISINTEGRATING ORAL
Status: COMPLETED | OUTPATIENT
Start: 2023-06-29 | End: 2023-06-29

## 2023-06-29 RX ORDER — ONDANSETRON 4 MG/1
4 TABLET, FILM COATED ORAL 3 TIMES DAILY PRN
Qty: 15 TABLET | Refills: 0 | Status: SHIPPED | OUTPATIENT
Start: 2023-06-29 | End: 2023-07-25

## 2023-06-29 RX ADMIN — ONDANSETRON 4 MG: 4 TABLET, ORALLY DISINTEGRATING ORAL at 05:06

## 2023-06-29 NOTE — ED PROVIDER NOTES
Encounter Date: 2023       History     Chief Complaint   Patient presents with    Emesis     Pt into ED with complaints of vomiting and abdominal pain.     Patient reports nausea, vomiting and generalized abdominal cramping several hours prior to arrival; no fever, no diarrhea, no blood in her stool; history of cholecystectomy    The history is provided by the patient.   Review of patient's allergies indicates:   Allergen Reactions    Aspirin      Other reaction(s): Bleeding  unable to take due to Gastric Bypass    Penicillin Rash     Past Medical History:   Diagnosis Date    Arthritis     Asthma     Esophageal spasm     Esophageal spasm     GERD (gastroesophageal reflux disease)     Hidradenitis axillaris     bilateral, had surgery on right    Insulin resistance      Past Surgical History:   Procedure Laterality Date    AXILLARY HIDRADENITIS EXCISION  in 10/2018    Dr. Payan in [facility], right side, wound dehisced    BILATERAL TUBAL LIGATION       SECTION      CHOLECYSTECTOMY      COLONOSCOPY  2021    GASTRIC BYPASS      LAPAROSCOPIC GASTRIC BANDING      [place], [place]    TONSILLECTOMY       Family History   Problem Relation Age of Onset    Hypertension Mother     Diabetes Mother     Mitral valve prolapse Mother     Hypertension Father     Diabetes Father     Kidney cancer Father      Social History     Tobacco Use    Smoking status: Never    Smokeless tobacco: Never   Substance Use Topics    Alcohol use: Yes     Comment: occasionally    Drug use: Never     Review of Systems   Constitutional: Negative.    HENT: Negative.     Respiratory: Negative.     Cardiovascular: Negative.    Gastrointestinal: Negative.    Musculoskeletal: Negative.    Skin: Negative.    Neurological: Negative.    All other systems reviewed and are negative.    Physical Exam     Initial Vitals [23 0440]   BP Pulse Resp Temp SpO2   122/83 99 16 98.4 °F (36.9 °C) 96 %      MAP       --         Physical  Exam    Nursing note and vitals reviewed.  Constitutional: She appears well-developed and well-nourished.   HENT:   Head: Normocephalic and atraumatic.   Eyes: EOM are normal. Pupils are equal, round, and reactive to light.   Neck: Neck supple.   Normal range of motion.  Cardiovascular:  Normal rate, regular rhythm, normal heart sounds and intact distal pulses.           Pulmonary/Chest: Breath sounds normal.   Abdominal: Abdomen is soft. Bowel sounds are normal. She exhibits no distension. There is abdominal tenderness (Mild, generalized).   Musculoskeletal:         General: Normal range of motion.      Cervical back: Normal range of motion and neck supple.     Neurological: She is alert and oriented to person, place, and time. She has normal strength.   Skin: Skin is warm and dry.       ED Course   Procedures  Labs Reviewed   URINALYSIS, REFLEX TO URINE CULTURE - Abnormal; Notable for the following components:       Result Value    Blood, UA Trace-Lysed (*)     All other components within normal limits   COMPREHENSIVE METABOLIC PANEL - Abnormal; Notable for the following components:    Potassium Level 3.3 (*)     Chloride 109 (*)     Glucose Level 103 (*)     Blood Urea Nitrogen 22.9 (*)     Globulin 3.6 (*)     Albumin/Globulin Ratio 1.0 (*)     All other components within normal limits   CBC WITH DIFFERENTIAL - Abnormal; Notable for the following components:    WBC 12.16 (*)     Hgb 11.1 (*)     MCH 25.5 (*)     MCHC 29.4 (*)     All other components within normal limits   URINALYSIS, MICROSCOPIC - Abnormal; Notable for the following components:    Bacteria, UA 1+ (*)     Hyaline Casts, UA Few (*)     Squamous Epithelial Cells, UA Many (*)     All other components within normal limits   LIPASE - Normal   CBC W/ AUTO DIFFERENTIAL    Narrative:     The following orders were created for panel order CBC auto differential.  Procedure                               Abnormality         Status                    "  ---------                               -----------         ------                     CBC with Differential[[id number]]        Abnormal            Final result                 Please view results for these tests on the individual orders.          Imaging Results    None          Medications   ondansetron disintegrating tablet 4 mg (4 mg Oral Given 6/29/23 0500)     Medical Decision Making:   Initial Assessment:   Patient reports nausea, vomiting and generalized abdominal cramping several hours prior to arrival; no fever, no diarrhea, no blood in her stool; history of cholecystectomy  Differential Diagnosis:   Viral gastroenteritis, electrolyte abnormality, food toxicity  Clinical Tests:   Lab Tests: Ordered and Reviewed  ED Management:  Patient given Zofran 4 mg sublingual; symptoms much improved; prescription for Zofran sent to her pharmacy           ED Course as of 06/29/23 0605 Thu Jun 29, 2023   0549 WBC(!): 12.16 [DD]   0554 Potassium(!): 3.3 [DD]      ED Course User Index  [DD] Avel Chino MD          Patient Vitals for the past 24 hrs:   BP Temp Temp src Pulse Resp SpO2 Height Weight   06/29/23 0440 122/83 98.4 °F (36.9 °C) Oral 99 16 96 % 5' 3" (1.6 m) 96.6 kg (213 lb)   The patient is resting comfortably and in no acute distress.  She states that her symptoms have improved after treatment in Emergency Department. I personally discussed her test results and treatment plan.  Gave strict ED precautions.  Specific conditions for return to the emergency department and importance of follow up with her primary care provided or the physician listed on the discharge instructions.  Patient voices understanding and agrees to the plan discussed. All patients' questions have been answered at this time.   She has remained hemodynamically stable throughout entire stay in ED and is stable for discharge home.          Clinical Impression:   Final diagnoses:  [A08.4] Viral gastroenteritis (Primary)  [K57.92] " Diverticulitis        ED Disposition Condition    Discharge Stable          ED Prescriptions       Medication Sig Dispense Start Date End Date Auth. Provider    ondansetron (ZOFRAN) 4 MG tablet Take 1 tablet (4 mg total) by mouth 3 (three) times daily as needed for Nausea. 15 tablet 6/29/2023 -- Avel Chino MD          Follow-up Information       Follow up With Specialties Details Why Contact Info    [facility]  [facility] [address] [facility] [place] [address]  [phone]               Avel Chino MD  06/29/23 0606

## 2023-07-10 RX ORDER — PROMETHAZINE HYDROCHLORIDE AND DEXTROMETHORPHAN HYDROBROMIDE 6.25; 15 MG/5ML; MG/5ML
5 SYRUP ORAL EVERY 4 HOURS PRN
Qty: 118 ML | Refills: 0 | Status: SHIPPED | OUTPATIENT
Start: 2023-07-10 | End: 2023-07-20

## 2023-07-10 RX ORDER — AZITHROMYCIN 250 MG/1
TABLET, FILM COATED ORAL
Qty: 6 TABLET | Refills: 0 | Status: SHIPPED | OUTPATIENT
Start: 2023-07-10 | End: 2023-07-25

## 2023-07-20 ENCOUNTER — TELEPHONE (OUTPATIENT)
Dept: FAMILY MEDICINE | Facility: CLINIC | Age: 53
End: 2023-07-20
Payer: MEDICAID

## 2023-07-20 DIAGNOSIS — Z86.39 H/O DIABETES MELLITUS: Primary | ICD-10-CM

## 2023-07-24 ENCOUNTER — LAB VISIT (OUTPATIENT)
Dept: LAB | Facility: HOSPITAL | Age: 53
End: 2023-07-24
Attending: NURSE PRACTITIONER
Payer: MEDICAID

## 2023-07-24 DIAGNOSIS — Z86.39 H/O DIABETES MELLITUS: ICD-10-CM

## 2023-07-24 LAB
EST. AVERAGE GLUCOSE BLD GHB EST-MCNC: 105.4 MG/DL
HBA1C MFR BLD: 5.3 %

## 2023-07-24 PROCEDURE — 36415 COLL VENOUS BLD VENIPUNCTURE: CPT

## 2023-07-24 PROCEDURE — 83036 HEMOGLOBIN GLYCOSYLATED A1C: CPT

## 2023-07-25 ENCOUNTER — OFFICE VISIT (OUTPATIENT)
Dept: FAMILY MEDICINE | Facility: CLINIC | Age: 53
End: 2023-07-25
Payer: MEDICAID

## 2023-07-25 VITALS
RESPIRATION RATE: 20 BRPM | SYSTOLIC BLOOD PRESSURE: 106 MMHG | WEIGHT: 214 LBS | HEIGHT: 63 IN | OXYGEN SATURATION: 96 % | BODY MASS INDEX: 37.92 KG/M2 | DIASTOLIC BLOOD PRESSURE: 75 MMHG | HEART RATE: 98 BPM

## 2023-07-25 DIAGNOSIS — E16.2 LOW BLOOD SUGAR READING: ICD-10-CM

## 2023-07-25 DIAGNOSIS — Z12.31 BREAST CANCER SCREENING BY MAMMOGRAM: ICD-10-CM

## 2023-07-25 DIAGNOSIS — M62.838 MUSCLE SPASM: Primary | ICD-10-CM

## 2023-07-25 PROCEDURE — 3078F DIAST BP <80 MM HG: CPT | Mod: CPTII,,, | Performed by: NURSE PRACTITIONER

## 2023-07-25 PROCEDURE — 3074F PR MOST RECENT SYSTOLIC BLOOD PRESSURE < 130 MM HG: ICD-10-PCS | Mod: CPTII,,, | Performed by: NURSE PRACTITIONER

## 2023-07-25 PROCEDURE — 1159F MED LIST DOCD IN RCRD: CPT | Mod: CPTII,,, | Performed by: NURSE PRACTITIONER

## 2023-07-25 PROCEDURE — 1159F PR MEDICATION LIST DOCUMENTED IN MEDICAL RECORD: ICD-10-PCS | Mod: CPTII,,, | Performed by: NURSE PRACTITIONER

## 2023-07-25 PROCEDURE — 3078F PR MOST RECENT DIASTOLIC BLOOD PRESSURE < 80 MM HG: ICD-10-PCS | Mod: CPTII,,, | Performed by: NURSE PRACTITIONER

## 2023-07-25 PROCEDURE — 99213 OFFICE O/P EST LOW 20 MIN: CPT | Mod: ,,, | Performed by: NURSE PRACTITIONER

## 2023-07-25 PROCEDURE — 3044F PR MOST RECENT HEMOGLOBIN A1C LEVEL <7.0%: ICD-10-PCS | Mod: CPTII,,, | Performed by: NURSE PRACTITIONER

## 2023-07-25 PROCEDURE — 3008F BODY MASS INDEX DOCD: CPT | Mod: CPTII,,, | Performed by: NURSE PRACTITIONER

## 2023-07-25 PROCEDURE — 3074F SYST BP LT 130 MM HG: CPT | Mod: CPTII,,, | Performed by: NURSE PRACTITIONER

## 2023-07-25 PROCEDURE — 3044F HG A1C LEVEL LT 7.0%: CPT | Mod: CPTII,,, | Performed by: NURSE PRACTITIONER

## 2023-07-25 PROCEDURE — 99213 PR OFFICE/OUTPT VISIT, EST, LEVL III, 20-29 MIN: ICD-10-PCS | Mod: ,,, | Performed by: NURSE PRACTITIONER

## 2023-07-25 PROCEDURE — 3008F PR BODY MASS INDEX (BMI) DOCUMENTED: ICD-10-PCS | Mod: CPTII,,, | Performed by: NURSE PRACTITIONER

## 2023-07-25 RX ORDER — TRAZODONE HYDROCHLORIDE 50 MG/1
50 TABLET ORAL NIGHTLY
Qty: 30 TABLET | Refills: 11 | Status: SHIPPED | OUTPATIENT
Start: 2023-07-25 | End: 2024-01-18

## 2023-07-25 RX ORDER — ALBUTEROL SULFATE 90 UG/1
AEROSOL, METERED RESPIRATORY (INHALATION)
Qty: 18 G | Refills: 2 | Status: SHIPPED | OUTPATIENT
Start: 2023-07-25

## 2023-07-25 RX ORDER — PANTOPRAZOLE SODIUM 40 MG/1
1 TABLET, DELAYED RELEASE ORAL EVERY MORNING
COMMUNITY
Start: 2023-06-20 | End: 2024-03-11

## 2023-07-25 RX ORDER — TRIAMCINOLONE ACETONIDE 1 MG/G
0.1 CREAM TOPICAL 2 TIMES DAILY
COMMUNITY
Start: 2023-06-11 | End: 2024-01-18

## 2023-07-25 RX ORDER — METHOCARBAMOL 500 MG/1
500 TABLET, FILM COATED ORAL NIGHTLY PRN
Qty: 40 TABLET | Refills: 0 | Status: SHIPPED | OUTPATIENT
Start: 2023-07-25 | End: 2024-01-18

## 2023-07-25 NOTE — PROGRESS NOTES
SUBJECTIVE:     History of Present Illness      Chief Complaint: Diabetes    HPI:  Patient is a 52 y.o. year old female who presents to clinic for for diabetes follow-up.  Patient past medical history of diabetes which has improved with weight loss weight surgery about 2 years ago.  Patient has been off her diabetic medicine for over year.  Today she presents to the clinic or  states that her blood sugars drop in the 40s yesterday she was and fatigue.    Patient has chronic chest esophageal spasms since bariatric surgery.  She follows up with Dr. Billings in [place] for esophageal/ weight loss concerns.      Review of Systems:    Review of Systems    12 point review of systems conducted, negative except as stated in the history of present illness. See HPI for details.     Previous History      Review of patient's allergies indicates:   Allergen Reactions    Aspirin      Other reaction(s): Bleeding  unable to take due to Gastric Bypass    Penicillin Rash       Past Medical History:   Diagnosis Date    Arthritis     Asthma     Esophageal spasm     Esophageal spasm     GERD (gastroesophageal reflux disease)     Hidradenitis axillaris     bilateral, had surgery on right    Insulin resistance      Current Outpatient Medications   Medication Instructions    albuterol (PROVENTIL/VENTOLIN HFA) 90 mcg/actuation inhaler inhale1 puff by mouth 4 times per day as needed for wheezing    aluminum-magnesium hydroxide-simethicone (MAALOX) 200-200-20 mg/5 mL Susp 30 mLs, Oral, Every 6 hours PRN    blood-glucose meter kit To check BG 2 times daily, to use with insurance preferred meter    blood-glucose meter,continuous (DEXCOM ) Misc 1 each, Misc.(Non-Drug; Combo Route), Daily    diclofenac sodium (VOLTAREN) 1 % Gel apply 4 grams TO affected AREA EVERY 6 HOURS AS NEEDED FOR PAIN<BR>Strength: 1 %    docusate sodium (COLACE) 100 MG capsule TAKE ONE CAPSULE BY MOUTH TWICE DAILY    ergocalciferol (ERGOCALCIFEROL) 50,000  "Units, Oral, Every 7 days    HUMIRA 40 mg, Subcutaneous    lancets (TRUEPLUS LANCETS) 30 gauge Misc USE TO check sugar TWICE DAILY    meclizine (ANTIVERT) 25 mg tablet TAKE ONE TABLET BY MOUTH THREE TIMES DAILY as needed for dizziness<BR>Strength: 25 mg    methocarbamoL (ROBAXIN) 500 mg, Oral, Nightly PRN    multivitamin with minerals tablet Oral    nitroGLYCERIN (NITROSTAT) 0.4 mg, Sublingual, 2 times daily PRN    pantoprazole (PROTONIX) 40 MG tablet 1 tablet, Oral, Every morning    traZODone (DESYREL) 50 mg, Oral, Nightly    triamcinolone acetonide 0.1% (KENALOG) 0.1 g, Topical (Top), 2 times daily, Apply to affected area    TRUE METRIX GLUCOSE TEST STRIP Strp test once daily    TRUE METRIX GLUCOSE TEST STRIP Strp USE TO check sugar TWICE DAILY    venlafaxine (EFFEXOR-XR) 150 mg, Oral, Daily    zinc gluconate 50 mg, Oral, Daily     Past Surgical History:   Procedure Laterality Date    AXILLARY HIDRADENITIS EXCISION  in 10/2018    Dr. Payan in [facility], right side, wound dehisced    BILATERAL TUBAL LIGATION       SECTION      CHOLECYSTECTOMY      COLONOSCOPY  2021    GASTRIC BYPASS      LAPAROSCOPIC GASTRIC BANDING  2006    [place], [place]    TONSILLECTOMY       Family History   Problem Relation Age of Onset    Hypertension Mother     Diabetes Mother     Mitral valve prolapse Mother     Hypertension Father     Diabetes Father     Kidney cancer Father        Social History     Tobacco Use    Smoking status: Never    Smokeless tobacco: Never   Substance Use Topics    Alcohol use: Yes     Comment: occasionally    Drug use: Never        Health Maintenance      Health Maintenance   Topic Date Due    TETANUS VACCINE  Never done    Mammogram  2023    Lipid Panel  2028    Hepatitis C Screening  Completed       OBJECTIVE:     Physical Exam      Vital Signs Reviewed   Visit Vitals  /75 (BP Location: Left arm)   Pulse 98   Resp 20   Ht 5' 3" (1.6 m)   Wt 97.1 kg (214 lb)   LMP  (LMP " Unknown)   SpO2 96%   BMI 37.91 kg/m²       Physical Exam    Physical Exam:  General: Alert, well nourished, no acute distress, non-toxic appearing.   Eyes: Anicteric sclera, without conjunctival injection, normal lids, no purulent drainage, EOMs grossly intact.   Ears: No tragal tenderness. Tympanic membranes intact, pearly grey, without effusion or erythema and with a positive light reflex.   Mouth: Posterior pharynx without erythema. No exudate, ulcerations, or lesion. No tonsillar swelling.   Neck: Supple, full ROM, no rigidity, no cervical adenopathy.   Cardio: Normal rate and rhythm    Resp: Respirations even and unlabored, clear to auscultation bilaterally.   Abd: No ecchymosis or distension. Normal bowel sounds in all 4 quadrants. No tenderness to palpation. No rebound tenderness or guarding. No CVA tenderness.   Skin: No rashes or open lesions noted.   MSK: No swelling. No abrasions or signs of trauma. Ambulating without assistance.   Neuro: Alert,oriented No focal deficits noted. Facial expressions even.   Psych: Cooperative, Normal affect      Procedures    Procedures     Labs     Results for orders placed or performed in visit on 07/24/23   Hemoglobin A1C   Result Value Ref Range    Hemoglobin A1c 5.3 <=7.0 %    Estimated Average Glucose 105.4 mg/dL       Chemistry:  Lab Results   Component Value Date     06/29/2023    K 3.3 (L) 06/29/2023    CHLORIDE 109 (H) 06/29/2023    BUN 22.9 (H) 06/29/2023    CREATININE 0.66 06/29/2023    EGFRNORACEVR >60 06/29/2023    GLUCOSE 103 (H) 06/29/2023    CALCIUM 8.8 06/29/2023    ALKPHOS 95 06/29/2023    LABPROT 7.3 06/29/2023    ALBUMIN 3.7 06/29/2023    BILIDIR 0.2 03/16/2022    IBILI 0.20 03/16/2022    AST 19 06/29/2023    ALT 15 06/29/2023    MG 2.1 08/21/2018    IGXLEEGQ39NW 28.9 (L) 09/12/2022    TSH 0.8620 09/12/2022        Lab Results   Component Value Date    HGBA1C 5.3 07/24/2023        Hematology:  Lab Results   Component Value Date    WBC 12.16 (H)  06/29/2023    HGB 11.1 (L) 06/29/2023    HCT 37.8 06/29/2023     06/29/2023       Lipid Panel:  Lab Results   Component Value Date    CHOL 195 03/03/2023    HDL 64 (H) 03/03/2023    .00 03/03/2023    TRIG 61 03/03/2023    TOTALCHOLEST 3 03/03/2023        Urine:  Lab Results   Component Value Date    COLORUA Yellow 06/29/2023    APPEARANCEUA Clear 06/29/2023    SGUA 1.025 06/29/2023    PHUA 5.5 06/29/2023    PROTEINUA Negative 06/29/2023    GLUCOSEUA Negative 06/29/2023    KETONESUA Negative 06/29/2023    BLOODUA Trace-Lysed (A) 06/29/2023    NITRITESUA Negative 06/29/2023    LEUKOCYTESUR Negative 06/29/2023    RBCUA 3-5 06/29/2023    WBCUA 0-2 06/29/2023    BACTERIA 1+ (A) 06/29/2023         Assessment            ICD-10-CM ICD-9-CM   1. Muscle spasm  M62.838 728.85   2. Breast cancer screening by mammogram  Z12.31 V76.12   3. Low blood sugar reading  E16.2 251.2       Plan       1. Muscle spasm  - methocarbamoL (ROBAXIN) 500 MG Tab; Take 1 tablet (500 mg total) by mouth nightly as needed (leg spasm).  Dispense: 40 tablet; Refill: 0    2. Breast cancer screening by mammogram  - Mammo Digital Screening Bilat w/ [facility]; Future    3. Low blood sugar reading  History of diabetes patient has been without medication for the last 2 years       Current a1c 5.3  Thinks hypoglycemic episodes may be related to absorption from bariatric surgery.  Patient instructed to continue to monitor blood glucose.  Estimated average glucose is ranging 105  Orders Placed This Encounter    Mammo Digital Screening Bilat w/ [facility]    methocarbamoL (ROBAXIN) 500 MG Tab    traZODone (DESYREL) 50 MG tablet    albuterol (PROVENTIL/VENTOLIN HFA) 90 mcg/actuation inhaler    blood-glucose meter,continuous (DEXCOM ) Misc      Medication List with Changes/Refills   New Medications    BLOOD-GLUCOSE METER,CONTINUOUS (DEXCOM ) MISC    1 each by Misc.(Non-Drug; Combo Route) route Daily.    METHOCARBAMOL (ROBAXIN) 500 MG TAB    Take  1 tablet (500 mg total) by mouth nightly as needed (leg spasm).    TRAZODONE (DESYREL) 50 MG TABLET    Take 1 tablet (50 mg total) by mouth every evening.   Current Medications    ADALIMUMAB (HUMIRA) 40 MG/0.8 ML SYKT INJECTION    Inject 40 mg into the skin.    ALUMINUM-MAGNESIUM HYDROXIDE-SIMETHICONE (MAALOX) 200-200-20 MG/5 ML SUSP    Take 30 mLs by mouth every 6 (six) hours as needed (Acid reflux).    BLOOD-GLUCOSE METER KIT    To check BG 2 times daily, to use with insurance preferred meter    DICLOFENAC SODIUM (VOLTAREN) 1 % GEL    apply 4 grams TO affected AREA EVERY 6 HOURS AS NEEDED FOR PAIN  Strength: 1 %    DOCUSATE SODIUM (COLACE) 100 MG CAPSULE    TAKE ONE CAPSULE BY MOUTH TWICE DAILY    ERGOCALCIFEROL (ERGOCALCIFEROL) 50,000 UNIT CAP    Take 50,000 Units by mouth every 7 days.    LANCETS (TRUEPLUS LANCETS) 30 GAUGE MISC    USE TO check sugar TWICE DAILY    MECLIZINE (ANTIVERT) 25 MG TABLET    TAKE ONE TABLET BY MOUTH THREE TIMES DAILY as needed for dizziness  Strength: 25 mg    MULTIVITAMIN WITH MINERALS TABLET    Take by mouth.    NITROGLYCERIN (NITROSTAT) 0.4 MG SL TABLET    Place 1 tablet (0.4 mg total) under the tongue 2 (two) times daily as needed for Chest pain (severe esophageal spasm).    PANTOPRAZOLE (PROTONIX) 40 MG TABLET    Take 1 tablet by mouth every morning.    TRIAMCINOLONE ACETONIDE 0.1% (KENALOG) 0.1 % CREAM    Apply 0.1 g topically 2 (two) times daily. Apply to affected area    TRUE METRIX GLUCOSE TEST STRIP STRP    test once daily    TRUE METRIX GLUCOSE TEST STRIP STRP    USE TO check sugar TWICE DAILY    ZINC GLUCONATE 50 MG TABLET    Take 50 mg by mouth once daily.   Changed and/or Refilled Medications    Modified Medication Previous Medication    ALBUTEROL (PROVENTIL/VENTOLIN HFA) 90 MCG/ACTUATION INHALER albuterol (PROVENTIL/VENTOLIN HFA) 90 mcg/actuation inhaler       inhale1 puff by mouth 4 times per day as needed for wheezing    inhale1 puff by mouth 4 times per day as needed  for wheezing    VENLAFAXINE (EFFEXOR-XR) 150 MG CP24 venlafaxine (EFFEXOR-XR) 150 MG Cp24       Take 1 capsule (150 mg total) by mouth once daily.    Take 150 mg by mouth once daily.   Discontinued Medications    AZITHROMYCIN (Z-HILL) 250 MG TABLET    Take 2 tablets by mouth on day 1; Take 1 tablet by mouth on days 2-5    HYOSCYAMINE (LEVSIN/SL) 0.125 MG SUBL    Place 1 tablet (0.125 mg total) under the tongue every 4 (four) hours as needed (esophageal spasm).    OMEPRAZOLE (PRILOSEC) 10 MG CAPSULE      1, Daily, 0 Refill(s), Start Date: 08/21/22 21:04:00 CDT    ONDANSETRON (ZOFRAN) 4 MG TABLET    Take 1 tablet (4 mg total) by mouth 3 (three) times daily as needed for Nausea.    TRAZODONE (DESYREL) 50 MG TABLET    Take 1 tablet (50 mg total) by mouth every evening.       Follow up in about 4 weeks (around 8/22/2023).   Follow up in about 4 weeks (around 8/22/2023). In addition to their scheduled follow up, the patient has also been instructed to follow up on as needed basis.   Future Appointments   Date Time Provider Department Center   8/29/2023  3:15 PM SARAI Ramos [facility]   9/12/2023  2:15 PM [facility]icks[facility]   9/14/2023  1:00 PM [facility]   3/11/2024  1:30 PM SARAI Ramos [facility]

## 2023-08-07 DIAGNOSIS — Z86.39 H/O DIABETES MELLITUS: ICD-10-CM

## 2023-08-07 DIAGNOSIS — E16.2 LOW BLOOD SUGAR READING: ICD-10-CM

## 2023-08-07 RX ORDER — CALCIUM CITRATE/VITAMIN D3 200MG-6.25
TABLET ORAL
Qty: 100 EACH | Refills: 0 | Status: SHIPPED | OUTPATIENT
Start: 2023-08-07 | End: 2023-11-30

## 2023-08-27 ENCOUNTER — HOSPITAL ENCOUNTER (EMERGENCY)
Facility: HOSPITAL | Age: 53
Discharge: HOME | End: 2023-08-27
Attending: STUDENT IN AN ORGANIZED HEALTH CARE EDUCATION/TRAINING PROGRAM
Payer: MEDICAID

## 2023-08-27 ENCOUNTER — NURSE TRIAGE (OUTPATIENT)
Dept: ADMINISTRATIVE | Facility: CLINIC | Age: 53
End: 2023-08-27
Payer: MEDICAID

## 2023-08-27 VITALS
HEART RATE: 77 BPM | BODY MASS INDEX: 33.9 KG/M2 | TEMPERATURE: 98 F | SYSTOLIC BLOOD PRESSURE: 158 MMHG | OXYGEN SATURATION: 97 % | RESPIRATION RATE: 18 BRPM | HEIGHT: 67 IN | WEIGHT: 216 LBS | DIASTOLIC BLOOD PRESSURE: 94 MMHG

## 2023-08-27 DIAGNOSIS — R53.83 FATIGUE, UNSPECIFIED TYPE: Primary | ICD-10-CM

## 2023-08-27 LAB
ANION GAP SERPL CALC-SCNC: 11 MEQ/L
APPEARANCE UR: CLEAR
BACTERIA #/AREA URNS AUTO: ABNORMAL /HPF
BASOPHILS # BLD AUTO: 0.06 X10(3)/MCL
BASOPHILS NFR BLD AUTO: 0.7 %
BILIRUB UR QL STRIP.AUTO: NEGATIVE
BUN SERPL-MCNC: 19.6 MG/DL (ref 9.8–20.1)
CALCIUM SERPL-MCNC: 9.3 MG/DL (ref 8.4–10.2)
CHLORIDE SERPL-SCNC: 105 MMOL/L (ref 98–107)
CO2 SERPL-SCNC: 25 MMOL/L (ref 22–29)
COLOR UR: ABNORMAL
CREAT SERPL-MCNC: 0.7 MG/DL (ref 0.55–1.02)
CREAT/UREA NIT SERPL: 28
EOSINOPHIL # BLD AUTO: 0.21 X10(3)/MCL (ref 0–0.9)
EOSINOPHIL NFR BLD AUTO: 2.4 %
ERYTHROCYTE [DISTWIDTH] IN BLOOD BY AUTOMATED COUNT: 14.5 % (ref 11.5–17)
GFR SERPLBLD CREATININE-BSD FMLA CKD-EPI: >60 MLS/MIN/1.73/M2
GLUCOSE SERPL-MCNC: 100 MG/DL (ref 74–100)
GLUCOSE UR QL STRIP.AUTO: NEGATIVE
HCT VFR BLD AUTO: 39.9 % (ref 37–47)
HGB BLD-MCNC: 12.1 G/DL (ref 12–16)
IMM GRANULOCYTES # BLD AUTO: 0 X10(3)/MCL (ref 0–0.04)
IMM GRANULOCYTES NFR BLD AUTO: 0 %
KETONES UR QL STRIP.AUTO: NEGATIVE
LEUKOCYTE ESTERASE UR QL STRIP.AUTO: NEGATIVE
LYMPHOCYTES # BLD AUTO: 2.92 X10(3)/MCL (ref 0.6–4.6)
LYMPHOCYTES NFR BLD AUTO: 34 %
MCH RBC QN AUTO: 25.6 PG (ref 27–31)
MCHC RBC AUTO-ENTMCNC: 30.3 G/DL (ref 33–36)
MCV RBC AUTO: 84.5 FL (ref 80–94)
MONOCYTES # BLD AUTO: 0.71 X10(3)/MCL (ref 0.1–1.3)
MONOCYTES NFR BLD AUTO: 8.3 %
NEUTROPHILS # BLD AUTO: 4.7 X10(3)/MCL (ref 2.1–9.2)
NEUTROPHILS NFR BLD AUTO: 54.6 %
NITRITE UR QL STRIP.AUTO: NEGATIVE
PH UR STRIP.AUTO: 5 [PH]
PLATELET # BLD AUTO: 380 X10(3)/MCL (ref 130–400)
PMV BLD AUTO: 10.4 FL (ref 7.4–10.4)
POCT GLUCOSE: 80 MG/DL (ref 70–110)
POTASSIUM SERPL-SCNC: 3.9 MMOL/L (ref 3.5–5.1)
PROT UR QL STRIP.AUTO: NEGATIVE
RBC # BLD AUTO: 4.72 X10(6)/MCL (ref 4.2–5.4)
RBC #/AREA URNS AUTO: ABNORMAL /HPF
RBC UR QL AUTO: ABNORMAL
SODIUM SERPL-SCNC: 141 MMOL/L (ref 136–145)
SP GR UR STRIP.AUTO: 1.01
SQUAMOUS #/AREA URNS AUTO: ABNORMAL /HPF
UROBILINOGEN UR STRIP-ACNC: 0.2
WBC # SPEC AUTO: 8.6 X10(3)/MCL (ref 4.5–11.5)
WBC #/AREA URNS AUTO: ABNORMAL /HPF

## 2023-08-27 PROCEDURE — 99283 EMERGENCY DEPT VISIT LOW MDM: CPT

## 2023-08-27 PROCEDURE — 82962 GLUCOSE BLOOD TEST: CPT

## 2023-08-27 PROCEDURE — 85025 COMPLETE CBC W/AUTO DIFF WBC: CPT | Performed by: STUDENT IN AN ORGANIZED HEALTH CARE EDUCATION/TRAINING PROGRAM

## 2023-08-27 PROCEDURE — 81001 URINALYSIS AUTO W/SCOPE: CPT | Performed by: STUDENT IN AN ORGANIZED HEALTH CARE EDUCATION/TRAINING PROGRAM

## 2023-08-27 PROCEDURE — 80048 BASIC METABOLIC PNL TOTAL CA: CPT | Performed by: STUDENT IN AN ORGANIZED HEALTH CARE EDUCATION/TRAINING PROGRAM

## 2023-08-27 NOTE — TELEPHONE ENCOUNTER
"Reason for Disposition   [1] MODERATE weakness (i.e., interferes with work, school, normal activities) AND [2] cause unknown  (Exceptions: Weakness from acute minor illness or poor fluid intake; weakness is chronic and not worse.)    Additional Information   Negative: SEVERE difficulty breathing (e.g., struggling for each breath, speaks in single words)   Negative: Shock suspected (e.g., cold/pale/clammy skin, too weak to stand, low BP, rapid pulse)   Negative: Difficult to awaken or acting confused (e.g., disoriented, slurred speech)   Negative: [1] Fainted > 15 minutes ago AND [2] still feels too weak or dizzy to stand   Negative: [1] SEVERE weakness (i.e., unable to walk or barely able to walk, requires support) AND [2] new-onset or worsening   Negative: Sounds like a life-threatening emergency to the triager   Negative: Weakness of the face, arm or leg on one side of the body   Negative: [1] Diabetes mellitus AND [2] weakness from low blood sugar (i.e., < 60 mg/dl or 3.5 mmol/l)   Negative: Heat exhaustion suspected (i.e., dehydration from heat exposure)   Negative: Chest pain   Negative: Vomiting is main symptom   Negative: Diarrhea is main symptom     Not main symptom   Negative: Difficulty breathing   Negative: Heart beating < 50 beats per minute OR > 140 beats per minute   Negative: Extra heartbeats, irregular heart beating, or heart is beating very fast  (i.e., "palpitations")   Negative: Follows large amount of bleeding (e.g., from vomiting, rectum, vagina  (Exception: Small brief weakness from sight of a small amount blood.)   Negative: Black or tarry bowel movements   Negative: [1] Drinking very little AND [2] dehydration suspected (e.g., no urine > 12 hours, very dry mouth, very lightheaded)   Negative: Patient sounds very sick or weak to the triager    Protocols used: Weakness (Generalized) and Fatigue-A-AH  Pt's spouse states the pt complains of feeling weak like she will pass out. States she has a hx " of gastric by pass surgery. Advised per Triage protocol to see a Healthcare Provider at the ED within 4 hrs. Caller verbalized understanding.

## 2023-08-28 NOTE — ED PROVIDER NOTES
"Encounter Date: 2023       History     Chief Complaint   Patient presents with    Fatigue     Weakness x a few hours, states it feels like her sugar goes up and down, but isn't a diabetic     Patient is a 52-year-old white female with a past medical history of gastric bypass self-reported as a Sreekanth-en-Y procedure who presented to the ER today due to a 4 month history of feeling fatigued at night.  Patient states that she is been having this off and on for several months and states she is been followed up with the PCP who states "that it may be that she is prediabetic. "Patient states that she checks her sugars very frequently and states that it has been as low as 33 in the past.  Patient states she did feel symptomatic at that time though.  Patient states she was told by her gastric doctor to drink "a protein shake" when that happens.  She states that on average it it drops to the low 60s at night and during the day it has been as high as 105.  She denies any diplopia, dysarthria, dizziness, facial asymmetry, drooling, this orthopnea, focal deficits, changes in sensation.  She denies any chest pain, shortness of breath, abdominal pain.  She denies any dysuria hematuria.        Review of patient's allergies indicates:   Allergen Reactions    Aspirin      Other reaction(s): Bleeding  unable to take due to Gastric Bypass    Penicillin Rash     Past Medical History:   Diagnosis Date    Arthritis     Asthma     Esophageal spasm     Esophageal spasm     GERD (gastroesophageal reflux disease)     Hidradenitis axillaris     bilateral, had surgery on right    Insulin resistance      Past Surgical History:   Procedure Laterality Date    AXILLARY HIDRADENITIS EXCISION  in 10/2018    Dr. Payan in [facility], right side, wound dehisced    BILATERAL TUBAL LIGATION       SECTION      CHOLECYSTECTOMY      COLONOSCOPY  2021    GASTRIC BYPASS      LAPAROSCOPIC GASTRIC BANDING      [place], [place]    " TONSILLECTOMY       Family History   Problem Relation Age of Onset    Hypertension Mother     Diabetes Mother     Mitral valve prolapse Mother     Hypertension Father     Diabetes Father     Kidney cancer Father      Social History     Tobacco Use    Smoking status: Never    Smokeless tobacco: Never   Substance Use Topics    Alcohol use: Yes     Comment: occasionally    Drug use: Never     Review of Systems   Constitutional:  Negative for chills, fatigue and fever.   HENT:  Negative for congestion, sore throat and trouble swallowing.    Eyes:  Negative for pain and visual disturbance.   Respiratory:  Negative for cough, shortness of breath and wheezing.    Cardiovascular:  Negative for chest pain and palpitations.   Gastrointestinal:  Negative for abdominal pain, blood in stool, constipation, diarrhea, nausea and vomiting.   Genitourinary:  Negative for dysuria and hematuria.   Musculoskeletal:  Negative for back pain and myalgias.   Skin:  Negative for rash and wound.   Neurological:  Negative for dizziness, tremors, seizures, syncope, facial asymmetry, speech difficulty, weakness, light-headedness, numbness and headaches.   Psychiatric/Behavioral:  Negative for confusion. The patient is not nervous/anxious.        Physical Exam     Initial Vitals [08/27/23 1742]   BP Pulse Resp Temp SpO2   123/88 88 18 98.3 °F (36.8 °C) 98 %      MAP       --         Physical Exam    Nursing note and vitals reviewed.  Constitutional: She appears well-developed and well-nourished. She is not diaphoretic. No distress.   HENT:   Head: Normocephalic.   Right Ear: External ear normal.   Left Ear: External ear normal.   Nose: Nose normal.   Eyes: Conjunctivae and EOM are normal. Right eye exhibits no discharge. Left eye exhibits no discharge. No scleral icterus.   Neck:   Normal range of motion.  Cardiovascular:  Normal rate, regular rhythm and normal heart sounds.     Exam reveals no gallop and no friction rub.       No murmur  heard.  Pulmonary/Chest: Breath sounds normal. No stridor. No respiratory distress. She has no wheezes. She has no rhonchi. She has no rales.   Abdominal: Abdomen is soft. She exhibits no distension. There is no abdominal tenderness. There is no rebound and no guarding.   Musculoskeletal:         General: Normal range of motion.      Cervical back: Normal range of motion.     Neurological: She is alert and oriented to person, place, and time. She has normal strength. No cranial nerve deficit or sensory deficit. GCS score is 15. GCS eye subscore is 4. GCS verbal subscore is 5. GCS motor subscore is 6.   CN II-XII intact bilaterally, PERRLA, EOMI, AO, no facial asymmetry noted, no abnormalities of vision loss or peripheral vision loss, strength 5/5 x 4 extremities, sensation intact throughout, no focal neurological deficits noted on exam.  Gait stable without assistance. Negative Pronator drift bilaterally.       Skin: Skin is warm. No rash noted. No erythema.   Psychiatric: She has a normal mood and affect. Her behavior is normal.         ED Course   Procedures  Labs Reviewed   URINALYSIS, REFLEX TO URINE CULTURE - Abnormal; Notable for the following components:       Result Value    Blood, UA Trace-Intact (*)     All other components within normal limits   CBC WITH DIFFERENTIAL - Abnormal; Notable for the following components:    MCH 25.6 (*)     MCHC 30.3 (*)     All other components within normal limits   URINALYSIS, MICROSCOPIC - Abnormal; Notable for the following components:    Squamous Epithelial Cells, UA Few (*)     All other components within normal limits   CBC W/ AUTO DIFFERENTIAL    Narrative:     The following orders were created for panel order CBC Auto Differential.  Procedure                               Abnormality         Status                     ---------                               -----------         ------                     CBC with Differential[[id number]]        Abnormal             Final result                 Please view results for these tests on the individual orders.   BASIC METABOLIC PANEL   POCT GLUCOSE          Imaging Results    None          Medications - No data to display  Medical Decision Making  Differentials: UTI, dehydration, electrolyte disturbance, CVA/TIA, hypoglycemia  52-year-old female no significant past medical history presented due to concerns for fatigue chronically.  No other discernible symptoms were identified.  Patient seems to think that all symptoms are attributed to her glucose being elevated however per chart review it appears her A1c has always been less than 5.5 and she is never been hyperglycemic.  She seems overtly fixated on her glucose as the problem.  Neuro exam is completely unremarkable basic labs reassuring and urine studies were unremarkable as well.  I do somewhat have concerns about hypoglycemia given her read at home being low in the past.  They seemed to have been low a long time ago I do have concerns about possible in accuracy of her glucose monitor.  I did advise possibly using another glucose monitor to confirm its accuracy into follow up closely with her PCP.  It seems that she was never symptomatic when she was hypoglycemic thus leading me to be concerned about possible in accuracy.  Given the chronicity of her symptoms I do not feel that she is a danger for discharge and can follow up with outpatient workup.  All questions were answered in layman's terms and return precautions discussed    Amount and/or Complexity of Data Reviewed  Labs: ordered. Decision-making details documented in ED Course.                               Clinical Impression:   Final diagnoses:  [R53.83] Fatigue, unspecified type (Primary)        ED Disposition Condition    Discharge Stable          ED Prescriptions    None       Follow-up Information       Follow up With Specialties Details Why Contact [facility] [place] - Emergency Dept Emergency Medicine  If  symptoms worsen [address] [facility] [address] [phone]    Rachel Smith, FNP Family Medicine Schedule an appointment as soon as possible for a visit   [address] [facility] [address] [phone]               Adelfo Stack MD  08/27/23 5106

## 2023-08-28 NOTE — ED NOTES
Received report from Nyla BLAKE, assumed care of pt at this time.  Pt had gone to bathroom to collect urine sample but states she dropped the cup in the toilet.  New urine cup given to pt.  Pt drinking water

## 2023-09-09 ENCOUNTER — HOSPITAL ENCOUNTER (EMERGENCY)
Facility: HOSPITAL | Age: 53
Discharge: LEFT BEFORE DISCHARGE | End: 2023-09-10
Payer: MEDICAID

## 2023-09-09 VITALS
BODY MASS INDEX: 40.72 KG/M2 | SYSTOLIC BLOOD PRESSURE: 153 MMHG | DIASTOLIC BLOOD PRESSURE: 95 MMHG | HEART RATE: 93 BPM | TEMPERATURE: 98 F | RESPIRATION RATE: 20 BRPM | WEIGHT: 260 LBS

## 2023-09-09 DIAGNOSIS — R07.9 CHEST PAIN: ICD-10-CM

## 2023-09-09 LAB
ALBUMIN SERPL-MCNC: 3.8 G/DL (ref 3.5–5)
ALBUMIN/GLOB SERPL: 1 RATIO (ref 1.1–2)
ALP SERPL-CCNC: 108 UNIT/L (ref 40–150)
ALT SERPL-CCNC: 12 UNIT/L (ref 0–55)
AST SERPL-CCNC: 15 UNIT/L (ref 5–34)
BASOPHILS # BLD AUTO: 0.06 X10(3)/MCL
BASOPHILS NFR BLD AUTO: 0.6 %
BILIRUB SERPL-MCNC: 0.2 MG/DL
BUN SERPL-MCNC: 20.5 MG/DL (ref 9.8–20.1)
CALCIUM SERPL-MCNC: 8.6 MG/DL (ref 8.4–10.2)
CHLORIDE SERPL-SCNC: 110 MMOL/L (ref 98–107)
CO2 SERPL-SCNC: 22 MMOL/L (ref 22–29)
CREAT SERPL-MCNC: 0.65 MG/DL (ref 0.55–1.02)
EOSINOPHIL # BLD AUTO: 0.28 X10(3)/MCL (ref 0–0.9)
EOSINOPHIL NFR BLD AUTO: 2.8 %
ERYTHROCYTE [DISTWIDTH] IN BLOOD BY AUTOMATED COUNT: 14.6 % (ref 11.5–17)
GFR SERPLBLD CREATININE-BSD FMLA CKD-EPI: >60 MLS/MIN/1.73/M2
GLOBULIN SER-MCNC: 3.7 GM/DL (ref 2.4–3.5)
GLUCOSE SERPL-MCNC: 99 MG/DL (ref 74–100)
HCT VFR BLD AUTO: 35.7 % (ref 37–47)
HGB BLD-MCNC: 11.4 G/DL (ref 12–16)
IMM GRANULOCYTES # BLD AUTO: 0.03 X10(3)/MCL (ref 0–0.04)
IMM GRANULOCYTES NFR BLD AUTO: 0.3 %
LYMPHOCYTES # BLD AUTO: 4.19 X10(3)/MCL (ref 0.6–4.6)
LYMPHOCYTES NFR BLD AUTO: 41.9 %
MCH RBC QN AUTO: 26.5 PG (ref 27–31)
MCHC RBC AUTO-ENTMCNC: 31.9 G/DL (ref 33–36)
MCV RBC AUTO: 82.8 FL (ref 80–94)
MONOCYTES # BLD AUTO: 1.16 X10(3)/MCL (ref 0.1–1.3)
MONOCYTES NFR BLD AUTO: 11.6 %
NEUTROPHILS # BLD AUTO: 4.29 X10(3)/MCL (ref 2.1–9.2)
NEUTROPHILS NFR BLD AUTO: 42.8 %
NRBC BLD AUTO-RTO: 0 %
PLATELET # BLD AUTO: 350 X10(3)/MCL (ref 130–400)
PMV BLD AUTO: 10.7 FL (ref 7.4–10.4)
POTASSIUM SERPL-SCNC: 3.9 MMOL/L (ref 3.5–5.1)
PROT SERPL-MCNC: 7.5 GM/DL (ref 6.4–8.3)
RBC # BLD AUTO: 4.31 X10(6)/MCL (ref 4.2–5.4)
SODIUM SERPL-SCNC: 142 MMOL/L (ref 136–145)
TROPONIN I SERPL-MCNC: <0.01 NG/ML (ref 0–0.04)
WBC # SPEC AUTO: 10.01 X10(3)/MCL (ref 4.5–11.5)

## 2023-09-09 PROCEDURE — 93010 EKG 12-LEAD: ICD-10-PCS | Mod: ,,, | Performed by: INTERNAL MEDICINE

## 2023-09-09 PROCEDURE — 84484 ASSAY OF TROPONIN QUANT: CPT | Performed by: EMERGENCY MEDICINE

## 2023-09-09 PROCEDURE — 93005 ELECTROCARDIOGRAM TRACING: CPT

## 2023-09-09 PROCEDURE — 99900041 HC LEFT WITHOUT BEING SEEN- EMERGENCY

## 2023-09-09 PROCEDURE — 85025 COMPLETE CBC W/AUTO DIFF WBC: CPT | Performed by: EMERGENCY MEDICINE

## 2023-09-09 PROCEDURE — 80053 COMPREHEN METABOLIC PANEL: CPT | Performed by: EMERGENCY MEDICINE

## 2023-09-09 PROCEDURE — 93010 ELECTROCARDIOGRAM REPORT: CPT | Mod: ,,, | Performed by: INTERNAL MEDICINE

## 2023-09-14 ENCOUNTER — HOSPITAL ENCOUNTER (OUTPATIENT)
Dept: RADIOLOGY | Facility: HOSPITAL | Age: 53
Discharge: HOME | End: 2023-09-14
Attending: NURSE PRACTITIONER
Payer: MEDICAID

## 2023-09-14 DIAGNOSIS — Z12.31 BREAST CANCER SCREENING BY MAMMOGRAM: ICD-10-CM

## 2023-09-14 PROCEDURE — 77067 SCR MAMMO BI INCL CAD: CPT | Mod: 26,,, | Performed by: RADIOLOGY

## 2023-09-14 PROCEDURE — 77067 SCR MAMMO BI INCL CAD: CPT | Mod: TC

## 2023-09-14 PROCEDURE — 77067 MAMMO DIGITAL SCREENING BILAT WITH TOMO: ICD-10-PCS | Mod: 26,,, | Performed by: RADIOLOGY

## 2023-09-14 PROCEDURE — 77063 MAMMO DIGITAL SCREENING BILAT WITH TOMO: ICD-10-PCS | Mod: 26,,, | Performed by: RADIOLOGY

## 2023-09-14 PROCEDURE — 77063 BREAST TOMOSYNTHESIS BI: CPT | Mod: 26,,, | Performed by: RADIOLOGY

## 2023-10-09 ENCOUNTER — HOSPITAL ENCOUNTER (EMERGENCY)
Facility: HOSPITAL | Age: 53
Discharge: HOME | End: 2023-10-09
Attending: SPECIALIST
Payer: MEDICAID

## 2023-10-09 VITALS
BODY MASS INDEX: 38.62 KG/M2 | DIASTOLIC BLOOD PRESSURE: 76 MMHG | HEART RATE: 99 BPM | WEIGHT: 218 LBS | HEIGHT: 63 IN | OXYGEN SATURATION: 100 % | RESPIRATION RATE: 18 BRPM | SYSTOLIC BLOOD PRESSURE: 121 MMHG | TEMPERATURE: 98 F

## 2023-10-09 DIAGNOSIS — N30.90 CYSTITIS: Primary | ICD-10-CM

## 2023-10-09 DIAGNOSIS — R10.32 LLQ PAIN: ICD-10-CM

## 2023-10-09 DIAGNOSIS — K59.00 CONSTIPATION, UNSPECIFIED CONSTIPATION TYPE: ICD-10-CM

## 2023-10-09 LAB
APPEARANCE UR: CLEAR
BACTERIA #/AREA URNS AUTO: ABNORMAL /HPF
BILIRUB UR QL STRIP.AUTO: ABNORMAL
COLOR UR AUTO: YELLOW
GLUCOSE UR QL STRIP.AUTO: NEGATIVE
KETONES UR QL STRIP.AUTO: ABNORMAL
LEUKOCYTE ESTERASE UR QL STRIP.AUTO: NEGATIVE
MUCOUS THREADS URNS QL MICRO: ABNORMAL /LPF
NITRITE UR QL STRIP.AUTO: NEGATIVE
PH UR STRIP.AUTO: 5.5 [PH]
PROT UR QL STRIP.AUTO: ABNORMAL
RBC #/AREA URNS AUTO: ABNORMAL /HPF
RBC UR QL AUTO: NEGATIVE
SP GR UR STRIP.AUTO: >=1.03 (ref 1–1.03)
SQUAMOUS #/AREA URNS AUTO: ABNORMAL /HPF
UROBILINOGEN UR STRIP-ACNC: 0.2
WBC #/AREA URNS AUTO: ABNORMAL /HPF

## 2023-10-09 PROCEDURE — 25000003 PHARM REV CODE 250: Performed by: SPECIALIST

## 2023-10-09 PROCEDURE — 87088 URINE BACTERIA CULTURE: CPT | Performed by: SPECIALIST

## 2023-10-09 PROCEDURE — 81001 URINALYSIS AUTO W/SCOPE: CPT | Performed by: SPECIALIST

## 2023-10-09 PROCEDURE — 99283 EMERGENCY DEPT VISIT LOW MDM: CPT

## 2023-10-09 RX ORDER — NITROFURANTOIN 25; 75 MG/1; MG/1
100 CAPSULE ORAL
Status: COMPLETED | OUTPATIENT
Start: 2023-10-09 | End: 2023-10-09

## 2023-10-09 RX ORDER — NITROFURANTOIN 25; 75 MG/1; MG/1
100 CAPSULE ORAL 2 TIMES DAILY
Qty: 10 CAPSULE | Refills: 0 | Status: SHIPPED | OUTPATIENT
Start: 2023-10-09 | End: 2023-10-14

## 2023-10-09 RX ORDER — POLYETHYLENE GLYCOL 3350 17 G/17G
17 POWDER, FOR SOLUTION ORAL DAILY PRN
Qty: 10 EACH | Refills: 0 | Status: SHIPPED | OUTPATIENT
Start: 2023-10-09 | End: 2024-01-18

## 2023-10-09 RX ADMIN — NITROFURANTOIN MONOHYDRATE/MACROCRYSTALS 100 MG: 25; 75 CAPSULE ORAL at 09:10

## 2023-10-10 NOTE — ED PROVIDER NOTES
Encounter Date: 10/9/2023       History     Chief Complaint   Patient presents with    Flank Pain     Pt. Reports of left side pain that started today .Hx of diverticulitis. Denies N/V/D.      Patient points to her left lower quadrant and says the pain is there and not in the flank; she states she ate peanuts which she said she is not supposed to eat and the pain started after that; she also notes some dysuria; no nausea or vomiting, no diarrhea    The history is provided by the patient.     Review of patient's allergies indicates:   Allergen Reactions    Aspirin      Other reaction(s): Bleeding  unable to take due to Gastric Bypass    Penicillin Rash     Past Medical History:   Diagnosis Date    Arthritis     Asthma     Esophageal spasm     Esophageal spasm     GERD (gastroesophageal reflux disease)     Hidradenitis axillaris     bilateral, had surgery on right    Insulin resistance      Past Surgical History:   Procedure Laterality Date    AXILLARY HIDRADENITIS EXCISION  in 10/2018    Dr. Payan in [facility], right side, wound dehisced    BILATERAL TUBAL LIGATION       SECTION      CHOLECYSTECTOMY      COLONOSCOPY  2021    GASTRIC BYPASS      LAPAROSCOPIC GASTRIC BANDING      [place], [place]    TONSILLECTOMY       Family History   Problem Relation Age of Onset    Hypertension Mother     Diabetes Mother     Mitral valve prolapse Mother     Hypertension Father     Diabetes Father     Kidney cancer Father      Social History     Tobacco Use    Smoking status: Never    Smokeless tobacco: Never   Substance Use Topics    Alcohol use: Yes     Comment: occasionally    Drug use: Never     Review of Systems   Constitutional: Negative.    HENT: Negative.     Respiratory: Negative.     Cardiovascular: Negative.    Gastrointestinal: Negative.    Musculoskeletal:  Positive for arthralgias.   Skin: Negative.    Neurological: Negative.    Psychiatric/Behavioral:  The patient is nervous/anxious.    All other  systems reviewed and are negative.      Physical Exam     Initial Vitals [10/09/23 2012]   BP Pulse Resp Temp SpO2   121/76 99 18 98.1 °F (36.7 °C) 100 %      MAP       --         Physical Exam    Nursing note and vitals reviewed.  Constitutional: She appears well-developed and well-nourished.   HENT:   Head: Normocephalic and atraumatic.   Eyes: EOM are normal. Pupils are equal, round, and reactive to light.   Neck: Neck supple.   Normal range of motion.  Cardiovascular:  Normal rate, regular rhythm, normal heart sounds and intact distal pulses.           Pulmonary/Chest: Breath sounds normal.   Abdominal: Abdomen is soft. Bowel sounds are normal. She exhibits no distension and no mass. There is abdominal tenderness (minimal left lower quadrant tenderness to palpation). There is no rebound and no guarding.   Musculoskeletal:         General: Normal range of motion.      Cervical back: Normal range of motion and neck supple.     Neurological: She is alert and oriented to person, place, and time. She has normal strength. GCS score is 15. GCS eye subscore is 4. GCS verbal subscore is 5. GCS motor subscore is 6.   Skin: Skin is warm and dry.         ED Course   Procedures  Labs Reviewed   URINALYSIS, REFLEX TO URINE CULTURE - Abnormal; Notable for the following components:       Result Value    Protein, UA Trace (*)     Ketones, UA Trace (*)     Bilirubin, UA Small (*)     All other components within normal limits   URINALYSIS, MICROSCOPIC - Abnormal; Notable for the following components:    Bacteria, UA Many (*)     Mucous, UA Small (*)     WBC, UA 11-20 (*)     Squamous Epithelial Cells, UA Moderate (*)     All other components within normal limits   CULTURE, URINE          Imaging Results              X-Ray Abdomen AP 1 View (KUB) (Final result)  Result time 10/09/23 20:58:45      Final result by Tye Dean MD (10/09/23 20:58:45)                   Impression:      Nonobstructive bowel gas pattern.  Moderate  amount of retained stool in the colon suggest constipation.      Electronically signed by: Tye Dean MD  Date:    10/09/2023  Time:    20:58               Narrative:    EXAMINATION:  Abdomen one view    CLINICAL HISTORY:  Flank pain    COMPARISON:  05/07/2023    FINDINGS:  There is a moderate amount of retained stool in the colon.  The bowel gas pattern is nonobstructive.  No suspicious calcification.  There is evidence for prior hernia repair in the midline.  There are surgical clips in the right upper quadrant suggestive of prior cholecystectomy.                        Wet Read by Avel Chino MD (10/09/23 20:53:04, [facility] - Emergency Dept, Emergency Medicine)    Nonspecific bowel gas pattern                                     Medications   nitrofurantoin (macrocrystal-monohydrate) 100 MG capsule 100 mg (100 mg Oral Given 10/9/23 2110)     Medical Decision Making  Patient points to her left lower quadrant and says the pain is there and not in the flank; she states she ate peanuts which she said she is not supposed to eat and the pain started after that; she also notes some dysuria; no nausea or vomiting, no diarrhea    DIFFERENTIAL DIAGNOSIS- constipation, diverticulosis, diverticulitis, UTI    Amount and/or Complexity of Data Reviewed  Labs: ordered. Decision-making details documented in ED Course.  Radiology: ordered and independent interpretation performed. Decision-making details documented in ED Course.    Risk  OTC drugs.  Prescription drug management.  Risk Details: Patient with somewhat contaminated urine but is having urinary symptoms for possible cystitis; we will give Macrobid and a prescription; patient's x-ray did reveal some evidence of constipation and a prescription MiraLax was sent to her pharmacy; I reviewed her x-ray and urinalysis with her    Patient Vitals for the past 24 hrs:   BP Temp Temp src Pulse Resp SpO2 Height Weight   10/09/23 2012 121/76 98.1 °F (36.7 °C) Oral  "99 18 100 % 5' 3" (1.6 m) 98.9 kg (218 lb)        The patient is resting comfortably and in no acute distress.  She states that her symptoms have improved after treatment in Emergency Department. I personally discussed her test results and treatment plan.  Gave strict ED precautions.  Specific conditions for return to the emergency department and importance of follow up with her primary care provided or the physician listed on the discharge instructions.  Patient voices understanding and agrees to the plan discussed. All patients' questions have been answered at this time.   She has remained hemodynamically stable throughout entire stay in ED and is stable for discharge home.                         Clinical Impression:   Final diagnoses:  [R10.32] LLQ pain  [N30.90] Cystitis (Primary)  [K59.00] Constipation, unspecified constipation type        ED Disposition Condition    Discharge Stable          ED Prescriptions       Medication Sig Dispense Start Date End Date Auth. Provider    nitrofurantoin, macrocrystal-monohydrate, (MACROBID) 100 MG capsule Take 1 capsule (100 mg total) by mouth 2 (two) times daily. for 5 days 10 capsule 10/9/2023 10/14/2023 Avel Chino MD    polyethylene glycol (GLYCOLAX) 17 gram PwPk Take 17 g by mouth daily as needed (Constipation). 10 each 10/9/2023 -- Avel Chino MD          Follow-up Information       Follow up With Specialties Details Why Contact Info    Rachel Smith, P Family Medicine In 1 week As needed [address] [facility] [address]  [phone]               Avel Chino MD  10/09/23 2228    "

## 2023-10-12 LAB — BACTERIA UR CULT: NORMAL

## 2023-11-07 ENCOUNTER — LAB VISIT (OUTPATIENT)
Dept: LAB | Facility: HOSPITAL | Age: 53
End: 2023-11-07
Attending: INTERNAL MEDICINE
Payer: MEDICAID

## 2023-11-07 DIAGNOSIS — Z11.1 SCREENING EXAMINATION FOR PULMONARY TUBERCULOSIS: Primary | ICD-10-CM

## 2023-11-07 DIAGNOSIS — Z51.81 ENCOUNTER FOR THERAPEUTIC DRUG MONITORING: ICD-10-CM

## 2023-11-07 LAB
ALBUMIN SERPL-MCNC: 3.7 G/DL (ref 3.5–5)
ALBUMIN/GLOB SERPL: 1.1 RATIO (ref 1.1–2)
ALP SERPL-CCNC: 101 UNIT/L (ref 40–150)
ALT SERPL-CCNC: 11 UNIT/L (ref 0–55)
AST SERPL-CCNC: 15 UNIT/L (ref 5–34)
BASOPHILS # BLD AUTO: 0.06 X10(3)/MCL
BASOPHILS NFR BLD AUTO: 0.8 %
BILIRUB SERPL-MCNC: 0.3 MG/DL
BUN SERPL-MCNC: 15 MG/DL (ref 9.8–20.1)
CALCIUM SERPL-MCNC: 8.5 MG/DL (ref 8.4–10.2)
CHLORIDE SERPL-SCNC: 107 MMOL/L (ref 98–107)
CO2 SERPL-SCNC: 27 MMOL/L (ref 22–29)
CREAT SERPL-MCNC: 0.64 MG/DL (ref 0.55–1.02)
EOSINOPHIL # BLD AUTO: 0.32 X10(3)/MCL (ref 0–0.9)
EOSINOPHIL NFR BLD AUTO: 4.1 %
ERYTHROCYTE [DISTWIDTH] IN BLOOD BY AUTOMATED COUNT: 13.7 % (ref 11.5–17)
GFR SERPLBLD CREATININE-BSD FMLA CKD-EPI: >60 MLS/MIN/1.73/M2
GLOBULIN SER-MCNC: 3.5 GM/DL (ref 2.4–3.5)
GLUCOSE SERPL-MCNC: 73 MG/DL (ref 74–100)
HCT VFR BLD AUTO: 38.6 % (ref 37–47)
HGB BLD-MCNC: 11.4 G/DL (ref 12–16)
IMM GRANULOCYTES # BLD AUTO: 0.01 X10(3)/MCL (ref 0–0.04)
IMM GRANULOCYTES NFR BLD AUTO: 0.1 %
LYMPHOCYTES # BLD AUTO: 3.43 X10(3)/MCL (ref 0.6–4.6)
LYMPHOCYTES NFR BLD AUTO: 44.4 %
MCH RBC QN AUTO: 25.1 PG (ref 27–31)
MCHC RBC AUTO-ENTMCNC: 29.5 G/DL (ref 33–36)
MCV RBC AUTO: 84.8 FL (ref 80–94)
MONOCYTES # BLD AUTO: 0.73 X10(3)/MCL (ref 0.1–1.3)
MONOCYTES NFR BLD AUTO: 9.5 %
NEUTROPHILS # BLD AUTO: 3.17 X10(3)/MCL (ref 2.1–9.2)
NEUTROPHILS NFR BLD AUTO: 41.1 %
PLATELET # BLD AUTO: 350 X10(3)/MCL (ref 130–400)
PMV BLD AUTO: 10.2 FL (ref 7.4–10.4)
POTASSIUM SERPL-SCNC: 3.4 MMOL/L (ref 3.5–5.1)
PROT SERPL-MCNC: 7.2 GM/DL (ref 6.4–8.3)
RBC # BLD AUTO: 4.55 X10(6)/MCL (ref 4.2–5.4)
SODIUM SERPL-SCNC: 142 MMOL/L (ref 136–145)
WBC # SPEC AUTO: 7.72 X10(3)/MCL (ref 4.5–11.5)

## 2023-11-07 PROCEDURE — 36415 COLL VENOUS BLD VENIPUNCTURE: CPT

## 2023-11-07 PROCEDURE — 85025 COMPLETE CBC W/AUTO DIFF WBC: CPT

## 2023-11-07 PROCEDURE — 86480 TB TEST CELL IMMUN MEASURE: CPT

## 2023-11-07 PROCEDURE — 80053 COMPREHEN METABOLIC PANEL: CPT

## 2023-11-09 LAB
GAMMA INTERFERON BACKGROUND BLD IA-ACNC: 0.04 IU/ML
M TB IFN-G BLD-IMP: NEGATIVE
M TB IFN-G CD4+ BCKGRND COR BLD-ACNC: 0.04 IU/ML
M TB IFN-G CD4+CD8+ BCKGRND COR BLD-ACNC: 0.02 IU/ML
MITOGEN IGNF BCKGRD COR BLD-ACNC: 7.66 IU/ML

## 2023-11-10 ENCOUNTER — HOSPITAL ENCOUNTER (EMERGENCY)
Facility: HOSPITAL | Age: 53
Discharge: HOME | End: 2023-11-10
Attending: EMERGENCY MEDICINE
Payer: MEDICAID

## 2023-11-10 VITALS
DIASTOLIC BLOOD PRESSURE: 78 MMHG | HEIGHT: 63 IN | TEMPERATURE: 98 F | OXYGEN SATURATION: 98 % | WEIGHT: 218 LBS | SYSTOLIC BLOOD PRESSURE: 123 MMHG | BODY MASS INDEX: 38.62 KG/M2 | RESPIRATION RATE: 18 BRPM | HEART RATE: 73 BPM

## 2023-11-10 DIAGNOSIS — K57.92 DIVERTICULITIS: Primary | ICD-10-CM

## 2023-11-10 LAB
ALBUMIN SERPL-MCNC: 3.6 G/DL (ref 3.5–5)
ALBUMIN/GLOB SERPL: 0.9 RATIO (ref 1.1–2)
ALP SERPL-CCNC: 106 UNIT/L (ref 40–150)
ALT SERPL-CCNC: 8 UNIT/L (ref 0–55)
APPEARANCE UR: CLEAR
AST SERPL-CCNC: 17 UNIT/L (ref 5–34)
BACTERIA #/AREA URNS AUTO: ABNORMAL /HPF
BASOPHILS # BLD AUTO: 0.04 X10(3)/MCL
BASOPHILS NFR BLD AUTO: 0.4 %
BILIRUB SERPL-MCNC: 0.2 MG/DL
BILIRUB UR QL STRIP.AUTO: NEGATIVE
BUN SERPL-MCNC: 18.1 MG/DL (ref 9.8–20.1)
CALCIUM SERPL-MCNC: 8.9 MG/DL (ref 8.4–10.2)
CHLORIDE SERPL-SCNC: 109 MMOL/L (ref 98–107)
CO2 SERPL-SCNC: 27 MMOL/L (ref 22–29)
COLOR UR AUTO: YELLOW
CREAT SERPL-MCNC: 0.65 MG/DL (ref 0.55–1.02)
EOSINOPHIL # BLD AUTO: 0.4 X10(3)/MCL (ref 0–0.9)
EOSINOPHIL NFR BLD AUTO: 4.5 %
ERYTHROCYTE [DISTWIDTH] IN BLOOD BY AUTOMATED COUNT: 14.2 % (ref 11.5–17)
GFR SERPLBLD CREATININE-BSD FMLA CKD-EPI: >60 MLS/MIN/1.73/M2
GLOBULIN SER-MCNC: 3.9 GM/DL (ref 2.4–3.5)
GLUCOSE SERPL-MCNC: 100 MG/DL (ref 74–100)
GLUCOSE UR QL STRIP.AUTO: NEGATIVE
HCT VFR BLD AUTO: 35.8 % (ref 37–47)
HGB BLD-MCNC: 10.8 G/DL (ref 12–16)
IMM GRANULOCYTES # BLD AUTO: 0 X10(3)/MCL (ref 0–0.04)
IMM GRANULOCYTES NFR BLD AUTO: 0 %
KETONES UR QL STRIP.AUTO: ABNORMAL
LEUKOCYTE ESTERASE UR QL STRIP.AUTO: NEGATIVE
LYMPHOCYTES # BLD AUTO: 2.85 X10(3)/MCL (ref 0.6–4.6)
LYMPHOCYTES NFR BLD AUTO: 31.8 %
MCH RBC QN AUTO: 25.7 PG (ref 27–31)
MCHC RBC AUTO-ENTMCNC: 30.2 G/DL (ref 33–36)
MCV RBC AUTO: 85.2 FL (ref 80–94)
MONOCYTES # BLD AUTO: 0.85 X10(3)/MCL (ref 0.1–1.3)
MONOCYTES NFR BLD AUTO: 9.5 %
NEUTROPHILS # BLD AUTO: 4.83 X10(3)/MCL (ref 2.1–9.2)
NEUTROPHILS NFR BLD AUTO: 53.8 %
NITRITE UR QL STRIP.AUTO: NEGATIVE
PH UR STRIP.AUTO: 5.5 [PH]
PLATELET # BLD AUTO: 324 X10(3)/MCL (ref 130–400)
PMV BLD AUTO: 10.1 FL (ref 7.4–10.4)
POTASSIUM SERPL-SCNC: 3.8 MMOL/L (ref 3.5–5.1)
PROT SERPL-MCNC: 7.5 GM/DL (ref 6.4–8.3)
PROT UR QL STRIP.AUTO: NEGATIVE
RBC # BLD AUTO: 4.2 X10(6)/MCL (ref 4.2–5.4)
RBC #/AREA URNS AUTO: ABNORMAL /HPF
RBC UR QL AUTO: NEGATIVE
SODIUM SERPL-SCNC: 144 MMOL/L (ref 136–145)
SP GR UR STRIP.AUTO: >=1.03 (ref 1–1.03)
SQUAMOUS #/AREA URNS AUTO: ABNORMAL /HPF
UROBILINOGEN UR STRIP-ACNC: 0.2
WBC # SPEC AUTO: 8.97 X10(3)/MCL (ref 4.5–11.5)
WBC #/AREA URNS AUTO: ABNORMAL /HPF

## 2023-11-10 PROCEDURE — 80053 COMPREHEN METABOLIC PANEL: CPT | Performed by: EMERGENCY MEDICINE

## 2023-11-10 PROCEDURE — 99285 EMERGENCY DEPT VISIT HI MDM: CPT | Mod: 25

## 2023-11-10 PROCEDURE — 25500020 PHARM REV CODE 255: Performed by: EMERGENCY MEDICINE

## 2023-11-10 PROCEDURE — 85025 COMPLETE CBC W/AUTO DIFF WBC: CPT | Performed by: EMERGENCY MEDICINE

## 2023-11-10 PROCEDURE — 81001 URINALYSIS AUTO W/SCOPE: CPT | Performed by: EMERGENCY MEDICINE

## 2023-11-10 RX ORDER — METRONIDAZOLE 500 MG/1
500 TABLET ORAL 4 TIMES DAILY
Qty: 28 TABLET | Refills: 0 | Status: SHIPPED | OUTPATIENT
Start: 2023-11-10 | End: 2024-01-18

## 2023-11-10 RX ORDER — TRAMADOL HYDROCHLORIDE 50 MG/1
50 TABLET ORAL EVERY 6 HOURS PRN
Qty: 12 TABLET | Refills: 0 | Status: SHIPPED | OUTPATIENT
Start: 2023-11-10 | End: 2024-01-18

## 2023-11-10 RX ORDER — LEVOFLOXACIN 750 MG/1
750 TABLET ORAL DAILY
Qty: 7 TABLET | Refills: 0 | Status: SHIPPED | OUTPATIENT
Start: 2023-11-10 | End: 2023-11-17

## 2023-11-10 RX ADMIN — IOPAMIDOL 100 ML: 755 INJECTION, SOLUTION INTRAVENOUS at 04:11

## 2023-11-10 NOTE — ED PROVIDER NOTES
Encounter Date: 11/10/2023       History     Chief Complaint   Patient presents with    Abdominal Pain     Right sided abdominal pain thinks she has diverticulitis     This 53-year-old female presents to the emergency room with lower abdominal pain that began at 5:00 a.m. this morning.  It is made worse by standing up.  She states she has been having a little more difficulty defecating.  She has a history of diverticulitis.  She denies fever chills or sweats nausea or vomiting diarrhea or dysuria.       Review of patient's allergies indicates:   Allergen Reactions    Aspirin      Other reaction(s): Bleeding  unable to take due to Gastric Bypass    Penicillin Rash     Past Medical History:   Diagnosis Date    Arthritis     Asthma     Esophageal spasm     Esophageal spasm     GERD (gastroesophageal reflux disease)     Hidradenitis axillaris     bilateral, had surgery on right    Insulin resistance      Past Surgical History:   Procedure Laterality Date    AXILLARY HIDRADENITIS EXCISION  in 10/2018    Dr. Payan in [facility], right side, wound dehisced    BILATERAL TUBAL LIGATION       SECTION      CHOLECYSTECTOMY      COLONOSCOPY  2021    GASTRIC BYPASS      LAPAROSCOPIC GASTRIC BANDING      [place], [place]    TONSILLECTOMY       Family History   Problem Relation Age of Onset    Hypertension Mother     Diabetes Mother     Mitral valve prolapse Mother     Hypertension Father     Diabetes Father     Kidney cancer Father      Social History     Tobacco Use    Smoking status: Never    Smokeless tobacco: Never   Substance Use Topics    Alcohol use: Yes     Comment: occasionally    Drug use: Never     Review of Systems   Constitutional:  Negative for fever.   HENT:  Negative for sore throat.    Respiratory:  Negative for shortness of breath.    Cardiovascular:  Negative for chest pain.   Gastrointestinal:  Positive for abdominal pain (low abdomen). Negative for nausea.   Genitourinary:  Negative for  dysuria.   Musculoskeletal:  Negative for back pain.   Skin:  Negative for rash.   Neurological:  Negative for weakness.   Hematological:  Does not bruise/bleed easily.       Physical Exam     Initial Vitals [11/10/23 1332]   BP Pulse Resp Temp SpO2   123/78 73 18 98.2 °F (36.8 °C) 98 %      MAP       --         Physical Exam    Nursing note and vitals reviewed.  Constitutional: She appears well-developed and well-nourished.   HENT:   Head: Normocephalic and atraumatic.   Eyes: Conjunctivae and EOM are normal. Pupils are equal, round, and reactive to light.   Neck: Neck supple.   Normal range of motion.  Cardiovascular:  Normal rate, regular rhythm, normal heart sounds and intact distal pulses.           Pulmonary/Chest: Breath sounds normal.   Abdominal: Abdomen is soft. Bowel sounds are normal. There is abdominal tenderness (LUQ mostly). There is no rebound and no guarding.   Musculoskeletal:         General: Normal range of motion.      Cervical back: Normal range of motion and neck supple.     Neurological: She is alert and oriented to person, place, and time. She has normal strength.   Skin: Skin is warm and dry. Capillary refill takes less than 2 seconds.   Psychiatric: She has a normal mood and affect. Her behavior is normal. Judgment and thought content normal.         ED Course   Procedures  Labs Reviewed   URINALYSIS, REFLEX TO URINE CULTURE - Abnormal; Notable for the following components:       Result Value    Ketones, UA Trace (*)     All other components within normal limits   COMPREHENSIVE METABOLIC PANEL - Abnormal; Notable for the following components:    Chloride 109 (*)     Globulin 3.9 (*)     Albumin/Globulin Ratio 0.9 (*)     All other components within normal limits   CBC WITH DIFFERENTIAL - Abnormal; Notable for the following components:    Hgb 10.8 (*)     Hct 35.8 (*)     MCH 25.7 (*)     MCHC 30.2 (*)     All other components within normal limits   URINALYSIS, MICROSCOPIC - Abnormal;  Notable for the following components:    Bacteria, UA Moderate (*)     Squamous Epithelial Cells, UA Moderate (*)     All other components within normal limits   CBC W/ AUTO DIFFERENTIAL    Narrative:     The following orders were created for panel order CBC auto differential.  Procedure                               Abnormality         Status                     ---------                               -----------         ------                     CBC with Differential[[id number]]       Abnormal            Final result                 Please view results for these tests on the individual orders.          Imaging Results              CT Abdomen Pelvis With IV Contrast (Final result)  Result time 11/10/23 16:39:56      Final result by Tye Dean MD (11/10/23 16:39:56)                   Impression:      Descending and sigmoid colon diverticula with very mild pericolonic inflammatory changes about the distal descending colon, suggesting mild acute diverticulitis.    Fluid in nondistended loops of small bowel with questionable hyperenhancement of the mucosa, possibly reflecting mild enteritis.      Electronically signed by: Tye Dean MD  Date:    11/10/2023  Time:    16:39               Narrative:    EXAMINATION:  CT of the abdomen pelvis with contrast    CLINICAL HISTORY:  Left lower quadrant abdominal pain    COMPARISON:  12/11/2020    TECHNIQUE:  Routine CT of the abdomen pelvis performed with intravenous contrast    Total DLP: 1116.02 mGy.cm    Automatic exposure control was utilized to reduce the patient's dose    FINDINGS:  There are dependent changes in the lung bases noted.  There are descending and sigmoid colon diverticula.  There are mild pericolonic inflammatory changes about the distal descending colon best seen on image number 33 of series 601 and axial image 67 of series 3.    There are fluid-filled loops of small bowel which are not dilated with questionable mucosal hyperenhancement possibly  enteritis.  No free air, free fluid, fluid collection.  Postop changes in small bowel loops in the left hemiabdomen.    Small-sized hiatal hernia noted.  The gallbladder surgically absent.    The spleen, pancreas, right adrenal gland, and kidneys appear unremarkable.  The liver is enlarged.  There is a left adrenal nodule, unchanged when compared to prior CT.    The abdominal aorta normal caliber.  No abdominopelvic adenopathy.  There are postop changes from anterior abdominal ventral hernia repair.  There is small fat containing umbilical hernia.  There is right ovarian 3.8 cm cyst noted.    No osseous destructive process.                                       Medications   iopamidoL (ISOVUE-370) injection 100 mL (100 mLs Intravenous Given 11/10/23 1626)     Medical Decision Making  This 53-year-old female presents to the emergency room with lower abdominal pain that began at 5:00 a.m. this morning.  It is made worse by standing up.  She states she has been having a little more difficulty defecating.  She has a history of diverticulitis.  She denies fever chills or sweats nausea or vomiting diarrhea or dysuria.     Amount and/or Complexity of Data Reviewed  Labs: ordered. Decision-making details documented in ED Course.  Radiology: ordered. Decision-making details documented in ED Course.  Discussion of management or test interpretation with external provider(s): Will treat for early diverticulitis.    Risk  Prescription drug management.                               Clinical Impression:   Final diagnoses:  [K57.92] Diverticulitis (Primary)        ED Disposition Condition    Discharge Stable          ED Prescriptions       Medication Sig Dispense Start Date End Date Auth. Provider    levoFLOXacin (LEVAQUIN) 750 MG tablet Take 1 tablet (750 mg total) by mouth once daily. for 7 days 7 tablet 11/10/2023 11/17/2023 Celso Martinez MD    metroNIDAZOLE (FLAGYL) 500 MG tablet Take 1 tablet (500 mg total) by mouth 4  (four) times daily. 28 tablet 11/10/2023 -- Celso Martinez MD          Follow-up Information       Follow up With Specialties Details Why Contact Info    Rachel Smith, P Family Medicine Schedule an appointment as soon as possible for a visit   [address] [address]  [phone]               Ceslo Martinez MD  11/10/23 8392

## 2023-11-16 ENCOUNTER — HOSPITAL ENCOUNTER (EMERGENCY)
Facility: HOSPITAL | Age: 53
Discharge: LEFT BEFORE DISCHARGE | End: 2023-11-17
Payer: MEDICAID

## 2023-11-16 VITALS
WEIGHT: 218 LBS | OXYGEN SATURATION: 98 % | DIASTOLIC BLOOD PRESSURE: 90 MMHG | HEIGHT: 63 IN | RESPIRATION RATE: 16 BRPM | HEART RATE: 80 BPM | BODY MASS INDEX: 38.62 KG/M2 | TEMPERATURE: 98 F | SYSTOLIC BLOOD PRESSURE: 149 MMHG
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
## 2023-11-16 PROCEDURE — 99900041 HC LEFT WITHOUT BEING SEEN- EMERGENCY

## 2023-11-17 ENCOUNTER — HOSPITAL ENCOUNTER (EMERGENCY)
Facility: HOSPITAL | Age: 53
Discharge: HOME | End: 2023-11-17
Attending: STUDENT IN AN ORGANIZED HEALTH CARE EDUCATION/TRAINING PROGRAM
Payer: MEDICAID

## 2023-11-17 VITALS
OXYGEN SATURATION: 99 % | HEART RATE: 85 BPM | HEIGHT: 63 IN | RESPIRATION RATE: 18 BRPM | TEMPERATURE: 98 F | SYSTOLIC BLOOD PRESSURE: 119 MMHG | DIASTOLIC BLOOD PRESSURE: 79 MMHG | BODY MASS INDEX: 38.62 KG/M2

## 2023-11-17 DIAGNOSIS — M17.11 OSTEOARTHRITIS OF RIGHT KNEE, UNSPECIFIED OSTEOARTHRITIS TYPE: Primary | ICD-10-CM

## 2023-11-17 PROCEDURE — 63600175 PHARM REV CODE 636 W HCPCS: Performed by: STUDENT IN AN ORGANIZED HEALTH CARE EDUCATION/TRAINING PROGRAM

## 2023-11-17 PROCEDURE — 99284 EMERGENCY DEPT VISIT MOD MDM: CPT

## 2023-11-17 PROCEDURE — 96372 THER/PROPH/DIAG INJ SC/IM: CPT | Performed by: STUDENT IN AN ORGANIZED HEALTH CARE EDUCATION/TRAINING PROGRAM

## 2023-11-17 RX ORDER — KETOROLAC TROMETHAMINE 30 MG/ML
30 INJECTION, SOLUTION INTRAMUSCULAR; INTRAVENOUS
Status: COMPLETED | OUTPATIENT
Start: 2023-11-17 | End: 2023-11-17

## 2023-11-17 RX ORDER — KETOROLAC TROMETHAMINE 10 MG/1
10 TABLET, FILM COATED ORAL 3 TIMES DAILY
Qty: 15 TABLET | Refills: 0 | Status: SHIPPED | OUTPATIENT
Start: 2023-11-17 | End: 2023-11-22

## 2023-11-17 RX ADMIN — KETOROLAC TROMETHAMINE 30 MG: 30 INJECTION, SOLUTION INTRAMUSCULAR at 03:11

## 2023-11-17 NOTE — ED PROVIDER NOTES
Encounter Date: 2023       History     Chief Complaint   Patient presents with    Knee Pain     Right knee pain     Patient is a 53-year-old white female with a history of osteoarthritis, GERD in insulin resistance who presented to the ER today due to right knee pain for the last 3 days.  Patient denies any direct trauma and denies any joint instability.  She denies any unilateral leg swelling, history of blood clots or calf pain.  Patient points to the anterior aspect of the right knee as the source of the pain.  Important to note patient went to another hospital earlier today and had an x-ray done.  She denies taking anything for relief.  Patient states the pain is worse with movement and better with rest.  Patient states she is been diagnosed with arthritis before.  Patient does unsure what normally makes it better.      Review of patient's allergies indicates:   Allergen Reactions    Aspirin      Other reaction(s): Bleeding  unable to take due to Gastric Bypass    Penicillin Rash     Past Medical History:   Diagnosis Date    Arthritis     Asthma     Esophageal spasm     Esophageal spasm     GERD (gastroesophageal reflux disease)     Hidradenitis axillaris     bilateral, had surgery on right    Insulin resistance      Past Surgical History:   Procedure Laterality Date    AXILLARY HIDRADENITIS EXCISION  in 10/2018    Dr. Payan in [facility], right side, wound dehisced    BILATERAL TUBAL LIGATION       SECTION      CHOLECYSTECTOMY      COLONOSCOPY  2021    GASTRIC BYPASS      LAPAROSCOPIC GASTRIC BANDING  2006    [place], [place]    TONSILLECTOMY       Family History   Problem Relation Age of Onset    Hypertension Mother     Diabetes Mother     Mitral valve prolapse Mother     Hypertension Father     Diabetes Father     Kidney cancer Father      Social History     Tobacco Use    Smoking status: Never    Smokeless tobacco: Never   Substance Use Topics    Alcohol use: Yes     Comment:  occasionally    Drug use: Never     Review of Systems   Constitutional:  Negative for chills, fatigue and fever.   HENT:  Negative for congestion, sore throat and trouble swallowing.    Eyes:  Negative for pain and visual disturbance.   Respiratory:  Negative for cough, shortness of breath and wheezing.    Cardiovascular:  Negative for chest pain and palpitations.   Gastrointestinal:  Negative for abdominal pain, blood in stool, constipation, diarrhea, nausea and vomiting.   Genitourinary:  Negative for dysuria and hematuria.   Musculoskeletal:  Positive for arthralgias. Negative for back pain and myalgias.   Skin:  Negative for rash and wound.   Neurological:  Negative for seizures, syncope and headaches.   Psychiatric/Behavioral:  Negative for confusion. The patient is not nervous/anxious.        Physical Exam     Initial Vitals [11/17/23 0313]   BP Pulse Resp Temp SpO2   119/79 85 18 98.2 °F (36.8 °C) 99 %      MAP       --         Physical Exam    Nursing note and vitals reviewed.  Constitutional: She appears well-developed and well-nourished. She is not diaphoretic. No distress.   HENT:   Head: Normocephalic.   Right Ear: External ear normal.   Left Ear: External ear normal.   Nose: Nose normal.   Eyes: Conjunctivae and EOM are normal. Right eye exhibits no discharge. Left eye exhibits no discharge. No scleral icterus.   Neck:   Normal range of motion.  Cardiovascular:  Normal rate, regular rhythm and normal heart sounds.     Exam reveals no gallop and no friction rub.       No murmur heard.  Pulmonary/Chest: Breath sounds normal. No stridor. No respiratory distress. She has no wheezes. She has no rhonchi. She has no rales.   Musculoskeletal:         General: Normal range of motion.      Cervical back: Normal range of motion.      Comments: Right Knee Exam:  Full ROM to flexion of >90 degrees and full extension; not limited due to pain.  Passive flexion and extension with some crepitus palpated.  No effusion or  TTP diffusely.  No joint laxity to varus or valgus strain.  No laxity of MCL or LCL.  Negative Lachman's, negative anterior drawer, and negative posterior drawer.  No warmth to palpation.  No signs of trauma on exam.         Neurological: She is alert.   Skin: Skin is warm. No rash noted. No erythema.   Psychiatric: She has a normal mood and affect. Her behavior is normal.         ED Course   Procedures  Labs Reviewed - No data to display       Imaging Results    None       X-Rays:   Independently Interpreted Readings:   Other Readings:  X-ray from outside hospital right knee:  No acute osseous abnormalities.  Findings consistent with mild degenerative changes.    Medications   ketorolac injection 30 mg (30 mg Intramuscular Given 11/17/23 0336)     Medical Decision Making  Differentials: Arthritis, ligament injury, fracture   Overall well-appearing 53-year-old female presents with right knee pain.  History of arthritis.  X-ray showed no acute osseous abnormalities and knee exam showed in points to all ligaments of the knee.  Findings most consistent with degenerative changes and thus anti-inflammatories and rice therapy were discussed.  Ace wrap applied, Toradol shot given and elevation discussed.  All questions answered in layman's terms and return precautions were discussed.    Amount and/or Complexity of Data Reviewed  Radiology: ordered and independent interpretation performed. Decision-making details documented in ED Course.    Risk  Prescription drug management.                                   Clinical Impression:  Final diagnoses:  [M17.11] Osteoarthritis of right knee, unspecified osteoarthritis type (Primary)          ED Disposition Condition    Discharge Stable          ED Prescriptions       Medication Sig Dispense Start Date End Date Auth. Provider    ketorolac (TORADOL) 10 mg tablet Take 1 tablet (10 mg total) by mouth 3 (three) times daily. for 5 days 15 tablet 11/17/2023 11/22/2023 Adelfo Stack MD           Follow-up Information       Follow up With Specialties Details Why Contact Info    [facility] - Emergency Dept Emergency Medicine  If symptoms worsen [address] [facility] [address] [phone]    [facility] Family Medicine Schedule an appointment as soon as possible for a visit   [address] [facility] [address] [phone]               Adelfo Stack MD  11/17/23 4402

## 2023-11-30 DIAGNOSIS — E16.2 LOW BLOOD SUGAR READING: ICD-10-CM

## 2023-11-30 DIAGNOSIS — Z86.39 H/O DIABETES MELLITUS: ICD-10-CM

## 2023-11-30 RX ORDER — CALCIUM CITRATE/VITAMIN D3 200MG-6.25
TABLET ORAL
Qty: 100 EACH | Refills: 0 | Status: SHIPPED | OUTPATIENT
Start: 2023-11-30 | End: 2024-02-27

## 2024-01-17 ENCOUNTER — E-VISIT (OUTPATIENT)
Dept: FAMILY MEDICINE | Facility: CLINIC | Age: 54
End: 2024-01-17
Payer: MEDICAID

## 2024-01-17 DIAGNOSIS — J02.9 PHARYNGITIS, UNSPECIFIED ETIOLOGY: Primary | ICD-10-CM

## 2024-01-17 PROCEDURE — 99422 OL DIG E/M SVC 11-20 MIN: CPT | Mod: ,,, | Performed by: NURSE PRACTITIONER

## 2024-01-17 NOTE — PROGRESS NOTES
Patient ID: Lilian Arroyo is a 53 y.o. female.    Chief Complaint: URI (Entered automatically based on patient selection in Patient Portal.)    The patient initiated a request through "[email or web address]" on 1/17/2024 for evaluation and management with a chief complaint of URI (Entered automatically based on patient selection in Patient Portal.)     I evaluated the questionnaire submission on 1/17/24.    [facility] Peq Evisit Upper Respitatory/Cough Questionnaire    1/17/2024 12:56 PM CST - Filed by Patient   Do you agree to participate in an E-Visit? Yes   If you have any of the following symptoms, please present to your local ER or call 911:  I acknowledge   What is the main issue that you would like for your doctor to address today? Throat and ear hurts   Are you able to take your vital signs? No   Are you currently pregnant, could you be pregnant, or are you breast feeding? None of the above   What symptoms do you currently have?  Sore throat;  Ear pain   Have you had any of the following? None of the above   Have you ever smoked? I have never smoked   Have you had a fever? No   When did your symptoms first appear? 1/15/2024   In the last two weeks, have you been in close contact with someone who has COVID-19 or the Flu? No   In the last two weeks, have you worked or volunteered in a healthcare facility or as a ? Healthcare facilities include a hospital, medical or dental clinic, long-term care facility, or nursing home No   Do you live in a long-term care facility, nursing home, group home, or homeless shelter? No   List what you have done or taken to help your symptoms. Ear drops and throat drops   How severe are your symptoms? Moderate   Have your symptoms improved since they first appeared? No change   Have you taken an at home Covid test? No   Have you taken a Flu test? No   Have you been fully vaccinated for COVID? (2 Pfizer, 2 Moderna or 1 Shiraz [employer] Shir[employer] vaccine injections) Yes   Have you received a  booster? Yes   Have you recieved a Flu shot? Yes   When did you recieve your Flu shot? 12/15/2023   Do you have transportation to get tested for COVID if it is indicated and ordered for you at an [facility] location? Yes   Provide any information you feel is important to your history not asked above    Please attach any relevant images or files          Encounter Diagnosis   Name Primary?    Pharyngitis, unspecified etiology Yes    1. Pharyngitis, unspecified etiology  - clindamycin (CLEOCIN) 300 MG capsule; Take 1 capsule (300 mg total) by mouth 2 (two) times a day.  Dispense: 7 capsule; Refill: 0   Warm salt Listerine gargles   OTC tylenol Ibuprofen for pain   If symptoms worsen or no improvement  recommend patient patient be evaluated in person    No orders of the defined types were placed in this encounter.     Medications Ordered This Encounter   Medications    clindamycin (CLEOCIN) 300 MG capsule     Sig: Take 1 capsule (300 mg total) by mouth 2 (two) times a day.     Dispense:  7 capsule     Refill:  0        No follow-ups on file.      E-Visit Time Trackin

## 2024-01-18 RX ORDER — CLINDAMYCIN HYDROCHLORIDE 300 MG/1
300 CAPSULE ORAL 2 TIMES DAILY
Qty: 7 CAPSULE | Refills: 0 | Status: SHIPPED | OUTPATIENT
Start: 2024-01-18 | End: 2024-03-11 | Stop reason: ALTCHOICE

## 2024-02-06 ENCOUNTER — LAB VISIT (OUTPATIENT)
Dept: LAB | Facility: HOSPITAL | Age: 54
End: 2024-02-06
Attending: NURSE PRACTITIONER
Payer: MEDICAID

## 2024-02-06 DIAGNOSIS — Z12.11 SPECIAL SCREENING FOR MALIGNANT NEOPLASMS, COLON: ICD-10-CM

## 2024-02-06 DIAGNOSIS — Z86.010 PERSONAL HISTORY OF COLONIC POLYPS: Primary | ICD-10-CM

## 2024-02-06 LAB
HEMOCCULT SP1 STL QL: NEGATIVE
HEMOCCULT SP2 STL QL: NEGATIVE
HEMOCCULT SP3 STL QL: NEGATIVE

## 2024-02-06 PROCEDURE — 82270 OCCULT BLOOD FECES: CPT

## 2024-02-14 DIAGNOSIS — Z86.39 H/O DIABETES MELLITUS: ICD-10-CM

## 2024-02-14 DIAGNOSIS — Z00.00 WELLNESS EXAMINATION: Primary | ICD-10-CM

## 2024-02-27 DIAGNOSIS — E16.2 LOW BLOOD SUGAR READING: ICD-10-CM

## 2024-02-27 DIAGNOSIS — Z86.39 H/O DIABETES MELLITUS: ICD-10-CM

## 2024-02-27 RX ORDER — CALCIUM CITRATE/VITAMIN D3 200MG-6.25
TABLET ORAL
Qty: 100 EACH | Refills: 0 | Status: SHIPPED | OUTPATIENT
Start: 2024-02-27 | End: 2024-04-10

## 2024-03-11 ENCOUNTER — OFFICE VISIT (OUTPATIENT)
Dept: FAMILY MEDICINE | Facility: CLINIC | Age: 54
End: 2024-03-11
Payer: MEDICAID

## 2024-03-11 VITALS
WEIGHT: 212.69 LBS | DIASTOLIC BLOOD PRESSURE: 64 MMHG | BODY MASS INDEX: 37.68 KG/M2 | SYSTOLIC BLOOD PRESSURE: 109 MMHG | TEMPERATURE: 98 F | RESPIRATION RATE: 18 BRPM | OXYGEN SATURATION: 97 % | HEART RATE: 95 BPM | HEIGHT: 63 IN

## 2024-03-11 DIAGNOSIS — R42 VERTIGO: ICD-10-CM

## 2024-03-11 DIAGNOSIS — Z00.00 WELLNESS EXAMINATION: Primary | ICD-10-CM

## 2024-03-11 PROCEDURE — 1159F MED LIST DOCD IN RCRD: CPT | Mod: CPTII,,, | Performed by: NURSE PRACTITIONER

## 2024-03-11 PROCEDURE — 3074F SYST BP LT 130 MM HG: CPT | Mod: CPTII,,, | Performed by: NURSE PRACTITIONER

## 2024-03-11 PROCEDURE — 99396 PREV VISIT EST AGE 40-64: CPT | Mod: ,,, | Performed by: NURSE PRACTITIONER

## 2024-03-11 PROCEDURE — 3078F DIAST BP <80 MM HG: CPT | Mod: CPTII,,, | Performed by: NURSE PRACTITIONER

## 2024-03-11 PROCEDURE — 3008F BODY MASS INDEX DOCD: CPT | Mod: CPTII,,, | Performed by: NURSE PRACTITIONER

## 2024-03-11 RX ORDER — MECLIZINE HYDROCHLORIDE 25 MG/1
TABLET ORAL
Qty: 30 TABLET | Refills: 1 | Status: SHIPPED | OUTPATIENT
Start: 2024-03-11

## 2024-03-11 RX ORDER — PNV NO.95/FERROUS FUM/FOLIC AC 28MG-0.8MG
1 TABLET ORAL DAILY
COMMUNITY

## 2024-03-11 RX ORDER — MUPIROCIN 20 MG/G
OINTMENT TOPICAL 3 TIMES DAILY
COMMUNITY
Start: 2024-02-23

## 2024-03-11 RX ORDER — SUCRALFATE 1 G/10ML
1 SUSPENSION ORAL
COMMUNITY
Start: 2024-01-11

## 2024-03-11 NOTE — PROGRESS NOTES
SUBJECTIVE:     History of Present Illness      Chief Complaint: Wellness exam (Did not do lab.  C/O vertigo ( has had for years ), this episode started today around 1130am.  CBG today 129.)    HPI:  Patient is a 53 y.o. year old female who presents to clinic for annual patient had not completed lab works.  Today she is complaining of dizziness related vertigo reports that she has been out of medication for awhile.    Review of Systems:    Review of Systems    12 point review of systems conducted, negative except as stated in the history of present illness. See HPI for details.     Previous History    Rachel Smith, [facility]  Review of patient's allergies indicates:   Allergen Reactions    Aspirin      Other reaction(s): Bleeding  unable to take due to Gastric Bypass    Penicillin Rash       Past Medical History:   Diagnosis Date    Arthritis     Asthma     Esophageal spasm     Esophageal spasm     GERD (gastroesophageal reflux disease)     Hidradenitis axillaris     bilateral, had surgery on right    Insulin resistance      Current Outpatient Medications   Medication Instructions    albuterol (PROVENTIL/VENTOLIN HFA) 90 mcg/actuation inhaler inhale1 puff by mouth 4 times per day as needed for wheezing    blood-glucose meter kit To check BG 2 times daily, to use with insurance preferred meter    blood-glucose meter,continuous (DEXCOM ) Misc 1 each, Misc.(Non-Drug; Combo Route), Daily    cyanocobalamin (VITAMIN B-12) 100 MCG tablet 1 tablet, Oral, Daily    docusate sodium (COLACE) 100 MG capsule TAKE ONE CAPSULE BY MOUTH TWICE DAILY    HUMIRA 40 mg, Subcutaneous    lancets (TRUEPLUS LANCETS) 30 gauge Misc USE TO check sugar TWICE DAILY    meclizine (ANTIVERT) 25 mg tablet TAKE ONE TABLET BY MOUTH THREE TIMES DAILY as needed for dizziness Strength: 25 mg    multivitamin with minerals tablet Oral    mupirocin (BACTROBAN) 2 % ointment Topical (Top), 3 times daily    sucralfate (CARAFATE) 1 g, Oral, Before meals  "& nightly    TRUE METRIX GLUCOSE TEST STRIP Strp test once daily    TRUE METRIX GLUCOSE TEST STRIP Strp USE TO check sugar TWICE DAILY    venlafaxine (EFFEXOR-XR) 150 mg, Oral, Daily    zinc gluconate 50 mg, Oral, Daily     Past Surgical History:   Procedure Laterality Date    AXILLARY HIDRADENITIS EXCISION  in 10/2018    Dr. Payan in [facility], right side, wound dehisced    BILATERAL TUBAL LIGATION       SECTION      CHOLECYSTECTOMY      COLONOSCOPY  2021    GASTRIC BYPASS      LAPAROSCOPIC GASTRIC BANDING      [place], [place]    TONSILLECTOMY       Family History   Problem Relation Age of Onset    Hypertension Mother     Diabetes Mother     Mitral valve prolapse Mother     Hypertension Father     Diabetes Father     Kidney cancer Father        Social History     Tobacco Use    Smoking status: Never    Smokeless tobacco: Never   Substance Use Topics    Alcohol use: Yes     Comment: occasionally    Drug use: Never        Health Maintenance      Health Maintenance   Topic Date Due    TETANUS VACCINE  Never done    Shingles Vaccine (1 of 2) Never done    Mammogram  2024    Lipid Panel  2028    Colorectal Cancer Screening  2031    Hepatitis C Screening  Completed       OBJECTIVE:     Physical Exam      Vital Signs Reviewed   Visit Vitals  /64 (BP Location: Left arm, Patient Position: Sitting)   Pulse 95   Temp 98.2 °F (36.8 °C) (Oral)   Resp 18   Ht 5' 3" (1.6 m)   Wt 96.5 kg (212 lb 11.2 oz)   LMP  (LMP Unknown)   SpO2 97%   BMI 37.68 kg/m²       Physical Exam    Physical Exam:  General: Alert, well nourished, no acute distress, non-toxic appearing.   Eyes: Anicteric sclera, without conjunctival injection, normal lids, no purulent drainage, EOMs grossly intact.   Ears: No tragal tenderness. Tympanic membranes intact, pearly grey, without effusion or erythema and with a positive light reflex.   Mouth: Posterior pharynx without erythema. No exudate, ulcerations, or lesion. " No tonsillar swelling.   Neck: Supple, full ROM, no rigidity, no cervical adenopathy.   Cardio: Normal rate and rhythm    Resp: Respirations even and unlabored, clear to auscultation bilaterally.   Abd: No ecchymosis or distension. Normal bowel sounds in all 4 quadrants. No tenderness to palpation. No rebound tenderness or guarding. No CVA tenderness.   Skin: No rashes or open lesions noted.   MSK: No swelling. No abrasions or signs of trauma. Ambulating without assistance.   Neuro: Alert,oriented No focal deficits noted. Facial expressions even.   Psych: Cooperative, Normal affect      Procedures    Procedures     Labs     Results for orders placed or performed in visit on 02/06/24   Occult Blood Stool, CA Screen   Result Value Ref Range    Occult Blood Stool 1 Negative Negative   OCCULT BLOOD STOOL, CA SCREEN 2ND SPEC   Result Value Ref Range    Occult Blood Stool 2 Negative Negative, N/A   OCCULT BLOOD STOOL, CA SCREEN 3RD SPEC   Result Value Ref Range    Occult Blood Stool 3 Negative Negative, N/A       Chemistry:  Lab Results   Component Value Date     11/10/2023    K 3.8 11/10/2023    CHLORIDE 109 (H) 11/10/2023    BUN 18.1 11/10/2023    CREATININE 0.65 11/10/2023    EGFRNORACEVR >60 11/10/2023    GLUCOSE 100 11/10/2023    CALCIUM 8.9 11/10/2023    ALKPHOS 106 11/10/2023    LABPROT 7.5 11/10/2023    ALBUMIN 3.6 11/10/2023    BILIDIR 0.2 03/16/2022    IBILI 0.20 03/16/2022    AST 17 11/10/2023    ALT 8 11/10/2023    MG 2.1 08/21/2018    FDEXHVTI59GI 28.9 (L) 09/12/2022    TSH 1.64 09/21/2023        Lab Results   Component Value Date    HGBA1C 5.5 09/21/2023        Hematology:  Lab Results   Component Value Date    WBC 8.97 11/10/2023    HGB 10.8 (L) 11/10/2023    HCT 35.8 (L) 11/10/2023     11/10/2023       Lipid Panel:  Lab Results   Component Value Date    CHOL 189 09/21/2023    HDL 61 (H) 09/21/2023    .00 03/03/2023    TRIG 117 09/21/2023    TOTALCHOLEST 3 03/03/2023        Urine:  Lab  Results   Component Value Date    COLORUA Yellow 11/10/2023    APPEARANCEUA Clear 11/10/2023    SGUA >=1.030 11/10/2023    PHUA 5.5 11/10/2023    PROTEINUA Negative 11/10/2023    GLUCOSEUA Negative 11/10/2023    KETONESUA Trace (A) 11/10/2023    BLOODUA Negative 11/10/2023    NITRITESUA Negative 11/10/2023    LEUKOCYTESUR Negative 11/10/2023    RBCUA None Seen 11/10/2023    WBCUA 0-2 11/10/2023    BACTERIA Moderate (A) 11/10/2023         Assessment            ICD-10-CM ICD-9-CM   1. Wellness examination  Z00.00 V70.0   2. Vertigo  R42 780.4       Plan       1. Wellness examination  Discussed completing labs - will call with results  and preventative screenings   Overall health status reviewed.    Significant chronic conditions addressed, including ongoing treatment plans.   Good health habits reinforced.    Cardiovascular disease risk factors discussed.   Appropriate recommendations and preventative care medical   information provided with annual wellness exams encouraged.  Vaccination status   Mammo- UTD due 9/2024   Colon- UTD due 2031    Cervical _ UTD due 2026    2. Vertigo  - meclizine (ANTIVERT) 25 mg tablet; TAKE ONE TABLET BY MOUTH THREE TIMES DAILY as needed for dizziness Strength: 25 mg  Dispense: 30 tablet; Refill: 1    Orders Placed This Encounter    meclizine (ANTIVERT) 25 mg tablet      Medication List with Changes/Refills   Current Medications    ADALIMUMAB (HUMIRA) 40 MG/0.8 ML SYKT INJECTION    Inject 40 mg into the skin.    ALBUTEROL (PROVENTIL/VENTOLIN HFA) 90 MCG/ACTUATION INHALER    inhale1 puff by mouth 4 times per day as needed for wheezing    BLOOD-GLUCOSE METER KIT    To check BG 2 times daily, to use with insurance preferred meter    BLOOD-GLUCOSE METER,CONTINUOUS (DEXCOM ) MISC    1 each by Misc.(Non-Drug; Combo Route) route Daily.    CYANOCOBALAMIN (VITAMIN B-12) 100 MCG TABLET    Take 1 tablet by mouth once daily.    DOCUSATE SODIUM (COLACE) 100 MG CAPSULE    TAKE ONE CAPSULE BY  MOUTH TWICE DAILY    LANCETS (TRUEPLUS LANCETS) 30 GAUGE MISC    USE TO check sugar TWICE DAILY    MULTIVITAMIN WITH MINERALS TABLET    Take by mouth.    MUPIROCIN (BACTROBAN) 2 % OINTMENT    Apply topically 3 (three) times daily.    SUCRALFATE (CARAFATE) 100 MG/ML SUSPENSION    Take 1 g by mouth 4 (four) times daily before meals and nightly.    TRUE METRIX GLUCOSE TEST STRIP STRP    test once daily    TRUE METRIX GLUCOSE TEST STRIP STRP    USE TO check sugar TWICE DAILY    VENLAFAXINE (EFFEXOR-XR) 150 MG CP24    Take 1 capsule (150 mg total) by mouth once daily.    ZINC GLUCONATE 50 MG TABLET    Take 50 mg by mouth once daily.   Changed and/or Refilled Medications    Modified Medication Previous Medication    MECLIZINE (ANTIVERT) 25 MG TABLET meclizine (ANTIVERT) 25 mg tablet       TAKE ONE TABLET BY MOUTH THREE TIMES DAILY as needed for dizziness Strength: 25 mg    TAKE ONE TABLET BY MOUTH THREE TIMES DAILY as needed for dizziness  Strength: 25 mg   Discontinued Medications    ALUMINUM-MAGNESIUM HYDROXIDE-SIMETHICONE (MAALOX) 200-200-20 MG/5 ML SUSP    Take 30 mLs by mouth every 6 (six) hours as needed (Acid reflux).    CLINDAMYCIN (CLEOCIN) 300 MG CAPSULE    Take 1 capsule (300 mg total) by mouth 2 (two) times a day.    DICLOFENAC SODIUM (VOLTAREN) 1 % GEL    apply 4 grams TO affected AREA EVERY 6 HOURS AS NEEDED FOR PAIN  Strength: 1 %    ERGOCALCIFEROL (ERGOCALCIFEROL) 50,000 UNIT CAP    Take 50,000 Units by mouth every 7 days.    PANTOPRAZOLE (PROTONIX) 40 MG TABLET    Take 1 tablet by mouth every morning.       Follow up if symptoms worsen or fail to improve.   Follow up if symptoms worsen or fail to improve. In addition to their scheduled follow up, the patient has also been instructed to follow up on as needed basis.   Future Appointments   Date Time Provider Department Center   9/18/2024  2:00 PM Dickson Ayala MD [facility]

## 2024-03-11 NOTE — PATIENT INSTRUCTIONS
Adeel Quintana,     If you are due for any health screening(s) below please notify me so we can arrange them to be ordered and scheduled. Most healthy patients at your age complete them, but you are free to accept or refuse.     If you can't do it, I'll definitely understand. If you can, I'd certainly appreciate it!    All of your core healthy metrics are met.

## 2024-03-12 ENCOUNTER — HOSPITAL ENCOUNTER (EMERGENCY)
Facility: HOSPITAL | Age: 54
Discharge: HOME | End: 2024-03-12
Attending: SPECIALIST
Payer: MEDICAID

## 2024-03-12 VITALS
OXYGEN SATURATION: 97 % | BODY MASS INDEX: 37.56 KG/M2 | HEART RATE: 90 BPM | HEIGHT: 63 IN | SYSTOLIC BLOOD PRESSURE: 113 MMHG | RESPIRATION RATE: 20 BRPM | TEMPERATURE: 98 F | WEIGHT: 212 LBS | DIASTOLIC BLOOD PRESSURE: 81 MMHG

## 2024-03-12 DIAGNOSIS — R42 VERTIGO: Primary | ICD-10-CM

## 2024-03-12 DIAGNOSIS — J30.1 ALLERGIC RHINITIS DUE TO POLLEN, UNSPECIFIED SEASONALITY: ICD-10-CM

## 2024-03-12 DIAGNOSIS — R42 DIZZINESS: ICD-10-CM

## 2024-03-12 LAB — POCT GLUCOSE: 100 MG/DL (ref 70–110)

## 2024-03-12 PROCEDURE — 99284 EMERGENCY DEPT VISIT MOD MDM: CPT | Mod: 25

## 2024-03-12 PROCEDURE — 96372 THER/PROPH/DIAG INJ SC/IM: CPT | Performed by: SPECIALIST

## 2024-03-12 PROCEDURE — 93010 ELECTROCARDIOGRAM REPORT: CPT | Mod: ,,, | Performed by: INTERNAL MEDICINE

## 2024-03-12 PROCEDURE — 25000003 PHARM REV CODE 250: Performed by: SPECIALIST

## 2024-03-12 PROCEDURE — 82962 GLUCOSE BLOOD TEST: CPT

## 2024-03-12 PROCEDURE — 93005 ELECTROCARDIOGRAM TRACING: CPT

## 2024-03-12 PROCEDURE — 63600175 PHARM REV CODE 636 W HCPCS: Performed by: SPECIALIST

## 2024-03-12 RX ORDER — PREDNISONE 20 MG/1
20 TABLET ORAL 2 TIMES DAILY
Qty: 10 TABLET | Refills: 0 | Status: SHIPPED | OUTPATIENT
Start: 2024-03-12 | End: 2024-03-17

## 2024-03-12 RX ORDER — DEXAMETHASONE SODIUM PHOSPHATE 4 MG/ML
8 INJECTION, SOLUTION INTRA-ARTICULAR; INTRALESIONAL; INTRAMUSCULAR; INTRAVENOUS; SOFT TISSUE
Status: COMPLETED | OUTPATIENT
Start: 2024-03-12 | End: 2024-03-12

## 2024-03-12 RX ORDER — MECLIZINE HYDROCHLORIDE 25 MG/1
25 TABLET ORAL
Status: COMPLETED | OUTPATIENT
Start: 2024-03-12 | End: 2024-03-12

## 2024-03-12 RX ORDER — PREDNISONE 20 MG/1
20 TABLET ORAL 2 TIMES DAILY
Qty: 6 TABLET | Refills: 0 | Status: SHIPPED | OUTPATIENT
Start: 2024-03-12 | End: 2024-03-12

## 2024-03-12 RX ADMIN — DEXAMETHASONE SODIUM PHOSPHATE 8 MG: 4 INJECTION, SOLUTION INTRA-ARTICULAR; INTRALESIONAL; INTRAMUSCULAR; INTRAVENOUS; SOFT TISSUE at 06:03

## 2024-03-12 RX ADMIN — MECLIZINE HYDROCHLORIDE 25 MG: 25 TABLET ORAL at 06:03

## 2024-03-12 NOTE — ED PROVIDER NOTES
"Encounter Date: 3/12/2024       History     Chief Complaint   Patient presents with    Dizziness     Complains of dizziness, weakness, and feels like she needs to passed. Saw her doctor for same complaint yesterday and was given a refill on her meclizine. Reports it's not helping     Patient presents with dizziness and weakness and states she almost passed out today from her head spinning; patient seen yesterday for a wellness exam with her primary care physician and was given a prescription for vertigo as she had run out of meclizine; patient states the meclizine has not helped very much and she also reports nasal congestion and sinus pressure; she also reports she is doing blood work tomorrow for her primary care physician; patient describes the dizziness as being "drunk" and that she has had these symptoms before and taken meclizine in the past when it did help; took 1 dose yesterday and 1 dose earlier this morning    The history is provided by the patient.     Review of patient's allergies indicates:   Allergen Reactions    Aspirin      Other reaction(s): Bleeding  unable to take due to Gastric Bypass    Penicillin Rash     Past Medical History:   Diagnosis Date    Arthritis     Asthma     Esophageal spasm     GERD (gastroesophageal reflux disease)     Hidradenitis axillaris     bilateral, had surgery on right    Insulin resistance      Past Surgical History:   Procedure Laterality Date    AXILLARY HIDRADENITIS EXCISION  in 10/2018    Dr. Payan in [facility], right side, wound dehisced    BILATERAL TUBAL LIGATION       SECTION      CHOLECYSTECTOMY      COLONOSCOPY  2021    GASTRIC BYPASS      LAPAROSCOPIC GASTRIC BANDING      [place], [place]    TONSILLECTOMY       Family History   Problem Relation Age of Onset    Hypertension Mother     Diabetes Mother     Mitral valve prolapse Mother     Hypertension Father     Diabetes Father     Kidney cancer Father      Social History     Tobacco Use    " Smoking status: Never    Smokeless tobacco: Never   Substance Use Topics    Alcohol use: Yes     Comment: occasionally    Drug use: Never     Review of Systems   Constitutional: Negative.    HENT: Negative.     Respiratory: Negative.     Cardiovascular: Negative.    Gastrointestinal: Negative.    Musculoskeletal:  Positive for arthralgias.   Skin: Negative.    Neurological:  Positive for dizziness.   All other systems reviewed and are negative.      Physical Exam     Initial Vitals [03/12/24 1737]   BP Pulse Resp Temp SpO2   113/81 90 20 98.1 °F (36.7 °C) 97 %      MAP       --         Physical Exam    Nursing note and vitals reviewed.  Constitutional: She appears well-developed and well-nourished.   HENT:   Head: Normocephalic and atraumatic.   Eyes: EOM are normal. Pupils are equal, round, and reactive to light.   Neck: Neck supple.   Normal range of motion.  Cardiovascular:  Normal rate, regular rhythm, normal heart sounds and intact distal pulses.           Pulmonary/Chest: Breath sounds normal.   Abdominal: Abdomen is soft.   Musculoskeletal:         General: Normal range of motion.      Cervical back: Normal range of motion and neck supple.     Neurological: She is alert and oriented to person, place, and time. She has normal strength. No cranial nerve deficit. GCS score is 15. GCS eye subscore is 4. GCS verbal subscore is 5. GCS motor subscore is 6.   Skin: Skin is warm and dry.         ED Course   Procedures  Labs Reviewed   POCT GLUCOSE     EKG Readings: (Independently Interpreted)   Rhythm: Normal Sinus Rhythm. Heart Rate: 92. Ectopy: No Ectopy. Conduction: Normal. ST Segments: Normal ST Segments. T Waves: Normal. Clinical Impression: Normal Sinus Rhythm       Imaging Results    None          Medications   dexAMETHasone injection 8 mg (8 mg Intramuscular Given 3/12/24 1816)   meclizine tablet 25 mg (25 mg Oral Given 3/12/24 1815)     Medical Decision Making  Patient presents with dizziness and weakness and  "states she almost passed out today from her head spinning; patient seen yesterday for a wellness exam with her primary care physician and was given a prescription for vertigo as she had run out of meclizine; patient states the meclizine has not helped very much and she also reports nasal congestion and sinus pressure; she also reports she is doing blood work tomorrow for her primary care physician; patient describes the dizziness as being "drunk" and that she has had these symptoms before and taken meclizine in the past when it did help; took 1 dose yesterday and 1 dose earlier this morning    DIFFERENTIAL DIAGNOSIS- vertigo, nasal congestion, URI    Amount and/or Complexity of Data Reviewed  Labs: ordered.    Risk  Prescription drug management.  Risk Details: Patient given Decadron 8 mg IM and meclizine 25 mg p.o. in the emergency room; a prescription for prednisone 20 mg twice a day for 3 days was sent to her pharmacy and she was encouraged to continue her meclizine and do her blood work in the morning as ordered by her primary care physician; also discussed following up with ENT if symptoms are not improving over the next 2 days                     Patient Vitals for the past 24 hrs:   BP Temp Temp src Pulse Resp SpO2 Height Weight   03/12/24 1737 113/81 98.1 °F (36.7 °C) Oral 90 20 97 % 5' 3" (1.6 m) 96.2 kg (212 lb)           The patient is resting comfortably and in no acute distress.  She states that her symptoms have improved after treatment in Emergency Department. I personally discussed her test results and treatment plan.  Gave strict ED precautions.  Specific conditions for return to the emergency department and importance of follow up with her primary care provided or the physician listed on the discharge instructions.  Patient voices understanding and agrees to the plan discussed. All patients' questions have been answered at this time.   She has remained hemodynamically stable throughout entire stay " in ED and is stable for discharge home.           Clinical Impression:  Final diagnoses:  [R42] Dizziness  [R42] Vertigo (Primary)  [J30.1] Allergic rhinitis due to pollen, unspecified seasonality          ED Disposition Condition    Discharge Stable          ED Prescriptions       Medication Sig Dispense Start Date End Date Auth. Provider    predniSONE (DELTASONE) 20 MG tablet  (Status: Discontinued) Take 1 tablet (20 mg total) by mouth 2 (two) times daily. for 3 days 6 tablet 3/12/2024 3/12/2024 Avel Chino MD    predniSONE (DELTASONE) 20 MG tablet Take 1 tablet (20 mg total) by mouth 2 (two) times daily. for 5 days 10 tablet 3/12/2024 3/17/2024 Avel Chino MD          Follow-up Information       Follow up With Specialties Details Why Contact Info    [facility]  As needed [address] [facility] [address]  [phone]               Avel Chino MD  03/13/24 0002

## 2024-03-13 LAB
OHS QRS DURATION: 70 MS
OHS QTC CALCULATION: 437 MS

## 2024-03-14 ENCOUNTER — LAB VISIT (OUTPATIENT)
Dept: LAB | Facility: HOSPITAL | Age: 54
End: 2024-03-14
Attending: NURSE PRACTITIONER
Payer: MEDICAID

## 2024-03-14 DIAGNOSIS — Z00.00 WELLNESS EXAMINATION: ICD-10-CM

## 2024-03-14 DIAGNOSIS — Z86.39 H/O DIABETES MELLITUS: ICD-10-CM

## 2024-03-14 LAB
ALBUMIN SERPL-MCNC: 3.7 G/DL (ref 3.5–5)
ALBUMIN/GLOB SERPL: 1.1 RATIO (ref 1.1–2)
ALP SERPL-CCNC: 90 UNIT/L (ref 40–150)
ALT SERPL-CCNC: 13 UNIT/L (ref 0–55)
AST SERPL-CCNC: 14 UNIT/L (ref 5–34)
BASOPHILS # BLD AUTO: 0.02 X10(3)/MCL
BASOPHILS NFR BLD AUTO: 0.2 %
BILIRUB SERPL-MCNC: 0.3 MG/DL
BUN SERPL-MCNC: 21.9 MG/DL (ref 9.8–20.1)
CALCIUM SERPL-MCNC: 9.3 MG/DL (ref 8.4–10.2)
CHLORIDE SERPL-SCNC: 110 MMOL/L (ref 98–107)
CHOLEST SERPL-MCNC: 182 MG/DL
CHOLEST/HDLC SERPL: 3 {RATIO} (ref 0–5)
CO2 SERPL-SCNC: 27 MMOL/L (ref 22–29)
CREAT SERPL-MCNC: 0.71 MG/DL (ref 0.55–1.02)
DEPRECATED CALCIDIOL+CALCIFEROL SERPL-MC: 23.2 NG/ML (ref 30–80)
EOSINOPHIL # BLD AUTO: 0.02 X10(3)/MCL (ref 0–0.9)
EOSINOPHIL NFR BLD AUTO: 0.2 %
ERYTHROCYTE [DISTWIDTH] IN BLOOD BY AUTOMATED COUNT: 15.1 % (ref 11.5–17)
EST. AVERAGE GLUCOSE BLD GHB EST-MCNC: 105.4 MG/DL
GFR SERPLBLD CREATININE-BSD FMLA CKD-EPI: >60 MLS/MIN/1.73/M2
GLOBULIN SER-MCNC: 3.5 GM/DL (ref 2.4–3.5)
GLUCOSE SERPL-MCNC: 96 MG/DL (ref 74–100)
HBA1C MFR BLD: 5.3 %
HCT VFR BLD AUTO: 35.7 % (ref 37–47)
HDLC SERPL-MCNC: 67 MG/DL (ref 35–60)
HGB BLD-MCNC: 11 G/DL (ref 12–16)
IMM GRANULOCYTES # BLD AUTO: 0.01 X10(3)/MCL (ref 0–0.04)
IMM GRANULOCYTES NFR BLD AUTO: 0.1 %
LDLC SERPL CALC-MCNC: 104 MG/DL (ref 50–140)
LYMPHOCYTES # BLD AUTO: 4.08 X10(3)/MCL (ref 0.6–4.6)
LYMPHOCYTES NFR BLD AUTO: 43.8 %
MCH RBC QN AUTO: 25.2 PG (ref 27–31)
MCHC RBC AUTO-ENTMCNC: 30.8 G/DL (ref 33–36)
MCV RBC AUTO: 81.7 FL (ref 80–94)
MONOCYTES # BLD AUTO: 0.74 X10(3)/MCL (ref 0.1–1.3)
MONOCYTES NFR BLD AUTO: 7.9 %
NEUTROPHILS # BLD AUTO: 4.44 X10(3)/MCL (ref 2.1–9.2)
NEUTROPHILS NFR BLD AUTO: 47.8 %
PLATELET # BLD AUTO: 399 X10(3)/MCL (ref 130–400)
PMV BLD AUTO: 10.4 FL (ref 7.4–10.4)
POTASSIUM SERPL-SCNC: 3.7 MMOL/L (ref 3.5–5.1)
PROT SERPL-MCNC: 7.2 GM/DL (ref 6.4–8.3)
RBC # BLD AUTO: 4.37 X10(6)/MCL (ref 4.2–5.4)
SODIUM SERPL-SCNC: 144 MMOL/L (ref 136–145)
TRIGL SERPL-MCNC: 57 MG/DL (ref 37–140)
TSH SERPL-ACNC: 0.41 UIU/ML (ref 0.35–4.94)
VLDLC SERPL CALC-MCNC: 11 MG/DL
WBC # SPEC AUTO: 9.31 X10(3)/MCL (ref 4.5–11.5)

## 2024-03-14 PROCEDURE — 36415 COLL VENOUS BLD VENIPUNCTURE: CPT

## 2024-03-14 PROCEDURE — 85025 COMPLETE CBC W/AUTO DIFF WBC: CPT

## 2024-03-14 PROCEDURE — 80061 LIPID PANEL: CPT

## 2024-03-14 PROCEDURE — 80053 COMPREHEN METABOLIC PANEL: CPT

## 2024-03-14 PROCEDURE — 82306 VITAMIN D 25 HYDROXY: CPT

## 2024-03-14 PROCEDURE — 83036 HEMOGLOBIN GLYCOSYLATED A1C: CPT

## 2024-03-14 PROCEDURE — 84443 ASSAY THYROID STIM HORMONE: CPT

## 2024-03-15 ENCOUNTER — HOSPITAL ENCOUNTER (EMERGENCY)
Facility: HOSPITAL | Age: 54
Discharge: HOME | End: 2024-03-15
Attending: STUDENT IN AN ORGANIZED HEALTH CARE EDUCATION/TRAINING PROGRAM
Payer: MEDICAID

## 2024-03-15 ENCOUNTER — NURSE TRIAGE (OUTPATIENT)
Dept: ADMINISTRATIVE | Facility: CLINIC | Age: 54
End: 2024-03-15
Payer: MEDICAID

## 2024-03-15 VITALS
WEIGHT: 212 LBS | HEIGHT: 63 IN | DIASTOLIC BLOOD PRESSURE: 64 MMHG | RESPIRATION RATE: 20 BRPM | BODY MASS INDEX: 37.56 KG/M2 | TEMPERATURE: 98 F | HEART RATE: 77 BPM | OXYGEN SATURATION: 95 % | SYSTOLIC BLOOD PRESSURE: 102 MMHG

## 2024-03-15 DIAGNOSIS — R42 VERTIGO: Primary | ICD-10-CM

## 2024-03-15 DIAGNOSIS — R51.9 NONINTRACTABLE HEADACHE, UNSPECIFIED CHRONICITY PATTERN, UNSPECIFIED HEADACHE TYPE: ICD-10-CM

## 2024-03-15 DIAGNOSIS — R42 DIZZINESS: ICD-10-CM

## 2024-03-15 LAB
ALBUMIN SERPL-MCNC: 3.6 G/DL (ref 3.5–5)
ALBUMIN/GLOB SERPL: 0.9 RATIO (ref 1.1–2)
ALP SERPL-CCNC: 88 UNIT/L (ref 40–150)
ALT SERPL-CCNC: 15 UNIT/L (ref 0–55)
AST SERPL-CCNC: 15 UNIT/L (ref 5–34)
BASOPHILS # BLD AUTO: 0.02 X10(3)/MCL
BASOPHILS NFR BLD AUTO: 0.3 %
BILIRUB SERPL-MCNC: 0.2 MG/DL
BNP BLD-MCNC: 11.6 PG/ML
BUN SERPL-MCNC: 19.6 MG/DL (ref 9.8–20.1)
CALCIUM SERPL-MCNC: 9.1 MG/DL (ref 8.4–10.2)
CHLORIDE SERPL-SCNC: 111 MMOL/L (ref 98–107)
CO2 SERPL-SCNC: 21 MMOL/L (ref 22–29)
CREAT SERPL-MCNC: 0.73 MG/DL (ref 0.55–1.02)
EOSINOPHIL # BLD AUTO: 0.01 X10(3)/MCL (ref 0–0.9)
EOSINOPHIL NFR BLD AUTO: 0.1 %
ERYTHROCYTE [DISTWIDTH] IN BLOOD BY AUTOMATED COUNT: 15.1 % (ref 11.5–17)
GFR SERPLBLD CREATININE-BSD FMLA CKD-EPI: >60 MLS/MIN/1.73/M2
GLOBULIN SER-MCNC: 4.1 GM/DL (ref 2.4–3.5)
GLUCOSE SERPL-MCNC: 159 MG/DL (ref 74–100)
HCT VFR BLD AUTO: 37.2 % (ref 37–47)
HGB BLD-MCNC: 11.5 G/DL (ref 12–16)
IMM GRANULOCYTES # BLD AUTO: 0.01 X10(3)/MCL (ref 0–0.04)
IMM GRANULOCYTES NFR BLD AUTO: 0.1 %
LYMPHOCYTES # BLD AUTO: 1.43 X10(3)/MCL (ref 0.6–4.6)
LYMPHOCYTES NFR BLD AUTO: 19.9 %
MCH RBC QN AUTO: 25.4 PG (ref 27–31)
MCHC RBC AUTO-ENTMCNC: 30.9 G/DL (ref 33–36)
MCV RBC AUTO: 82.1 FL (ref 80–94)
MONOCYTES # BLD AUTO: 0.23 X10(3)/MCL (ref 0.1–1.3)
MONOCYTES NFR BLD AUTO: 3.2 %
NEUTROPHILS # BLD AUTO: 5.47 X10(3)/MCL (ref 2.1–9.2)
NEUTROPHILS NFR BLD AUTO: 76.4 %
PLATELET # BLD AUTO: 384 X10(3)/MCL (ref 130–400)
PMV BLD AUTO: 10.5 FL (ref 7.4–10.4)
POC CARDIAC TROPONIN I: 0 NG/ML (ref 0–0.08)
POTASSIUM SERPL-SCNC: 4 MMOL/L (ref 3.5–5.1)
PROT SERPL-MCNC: 7.7 GM/DL (ref 6.4–8.3)
RBC # BLD AUTO: 4.53 X10(6)/MCL (ref 4.2–5.4)
SAMPLE: NORMAL
SODIUM SERPL-SCNC: 143 MMOL/L (ref 136–145)
WBC # SPEC AUTO: 7.17 X10(3)/MCL (ref 4.5–11.5)

## 2024-03-15 PROCEDURE — 93010 ELECTROCARDIOGRAM REPORT: CPT | Mod: ,,, | Performed by: INTERNAL MEDICINE

## 2024-03-15 PROCEDURE — 99285 EMERGENCY DEPT VISIT HI MDM: CPT | Mod: 25

## 2024-03-15 PROCEDURE — 84484 ASSAY OF TROPONIN QUANT: CPT

## 2024-03-15 PROCEDURE — 93005 ELECTROCARDIOGRAM TRACING: CPT

## 2024-03-15 PROCEDURE — 85025 COMPLETE CBC W/AUTO DIFF WBC: CPT | Performed by: STUDENT IN AN ORGANIZED HEALTH CARE EDUCATION/TRAINING PROGRAM

## 2024-03-15 PROCEDURE — 83880 ASSAY OF NATRIURETIC PEPTIDE: CPT | Performed by: STUDENT IN AN ORGANIZED HEALTH CARE EDUCATION/TRAINING PROGRAM

## 2024-03-15 PROCEDURE — 25000003 PHARM REV CODE 250: Performed by: STUDENT IN AN ORGANIZED HEALTH CARE EDUCATION/TRAINING PROGRAM

## 2024-03-15 PROCEDURE — 80053 COMPREHEN METABOLIC PANEL: CPT | Performed by: STUDENT IN AN ORGANIZED HEALTH CARE EDUCATION/TRAINING PROGRAM

## 2024-03-15 RX ORDER — BUTALBITAL, ACETAMINOPHEN AND CAFFEINE 50; 325; 40 MG/1; MG/1; MG/1
1 TABLET ORAL EVERY 12 HOURS PRN
Qty: 10 TABLET | Refills: 0 | Status: SHIPPED | OUTPATIENT
Start: 2024-03-15 | End: 2024-03-20

## 2024-03-15 RX ORDER — BUTALBITAL, ACETAMINOPHEN AND CAFFEINE 50; 325; 40 MG/1; MG/1; MG/1
1 TABLET ORAL
Status: COMPLETED | OUTPATIENT
Start: 2024-03-15 | End: 2024-03-15

## 2024-03-15 RX ORDER — MECLIZINE HYDROCHLORIDE 25 MG/1
50 TABLET ORAL
Status: COMPLETED | OUTPATIENT
Start: 2024-03-15 | End: 2024-03-15

## 2024-03-15 RX ADMIN — MECLIZINE HYDROCHLORIDE 50 MG: 25 TABLET ORAL at 06:03

## 2024-03-15 RX ADMIN — BUTALBITAL, ACETAMINOPHEN, AND CAFFEINE 1 TABLET: 325; 50; 40 TABLET ORAL at 09:03

## 2024-03-15 NOTE — TELEPHONE ENCOUNTER
Pt calling, reports vertigo and unable to stand/walk without blacking out. Pt has been evaluated for this multiple times recently, but pt states the meclizine she took has not worked at all. Advised to ED now with 911 precautions per protocol, she verbalized understanding.    Reason for Disposition   SEVERE dizziness (vertigo) (e.g., unable to walk without assistance)    Additional Information   Negative: [1] Weakness (i.e., paralysis, loss of muscle strength) of the face, arm or leg on one side of the body AND [2] sudden onset AND [3] present now   Negative: [1] Numbness (i.e., loss of sensation) of the face, arm or leg on one side of the body AND [2] sudden onset AND [3] present now   Negative: [1] Loss of speech or garbled speech AND [2] sudden onset AND [3] present now   Negative: Difficult to awaken or acting confused (e.g., disoriented, slurred speech)   Negative: Sounds like a life-threatening emergency to the triager    Protocols used: Dizziness - Vertigo-A-

## 2024-03-15 NOTE — ED PROVIDER NOTES
Encounter Date: 3/15/2024       History     Chief Complaint   Patient presents with    Dizziness     Patient reports was seen here on  diagnosed with vertigo it's acting up again medication not helping seen Dr. Chino then. Also reports left ear radiating to back of head pain      Patient is a 53-year-old female with past medical history of GERD, gastric bypass, history of vertigo presents to the emergency department for 1 week of vertigo.  She states she feels off balance.  She states her dizziness is intermittent.  She reports she has a family history of vertigo and her son also has vertigo.  She has taken meclizine without relief.  Has not followed up with ENT.  States she was also had some upper respiratory type symptoms.  Also complains of left ear pain and headache.      Review of patient's allergies indicates:   Allergen Reactions    Aspirin      Other reaction(s): Bleeding  unable to take due to Gastric Bypass    Penicillin Rash     Past Medical History:   Diagnosis Date    Arthritis     Asthma     Esophageal spasm     GERD (gastroesophageal reflux disease)     Hidradenitis axillaris     bilateral, had surgery on right    Insulin resistance      Past Surgical History:   Procedure Laterality Date    AXILLARY HIDRADENITIS EXCISION  in 10/2018    Dr. Payan in [facility], right side, wound dehisced    BILATERAL TUBAL LIGATION       SECTION      CHOLECYSTECTOMY      COLONOSCOPY  2021    GASTRIC BYPASS      LAPAROSCOPIC GASTRIC BANDING      [place], [place]    TONSILLECTOMY       Family History   Problem Relation Age of Onset    Hypertension Mother     Diabetes Mother     Mitral valve prolapse Mother     Hypertension Father     Diabetes Father     Kidney cancer Father      Social History     Tobacco Use    Smoking status: Never    Smokeless tobacco: Never   Substance Use Topics    Alcohol use: Yes     Comment: occasionally    Drug use: Never     Review of Systems   Constitutional:  Negative  for fever.   HENT:  Negative for sore throat.    Eyes:  Negative for visual disturbance.   Respiratory:  Negative for shortness of breath.    Cardiovascular:  Negative for chest pain.   Gastrointestinal:  Negative for abdominal pain.   Genitourinary:  Negative for dysuria.   Musculoskeletal:  Negative for joint swelling.   Skin:  Negative for rash.   Neurological:  Negative for weakness.   Psychiatric/Behavioral:  Negative for confusion.        Physical Exam     Initial Vitals [03/15/24 1812]   BP Pulse Resp Temp SpO2   112/77 104 20 97.8 °F (36.6 °C) 96 %      MAP       --         Physical Exam    Nursing note and vitals reviewed.  Constitutional: She appears well-developed and well-nourished.   HENT:   Head: Normocephalic and atraumatic.   Right Ear: Tympanic membrane normal.   Left Ear: Tympanic membrane normal.   Eyes: EOM are normal. Pupils are equal, round, and reactive to light.   No nystagmus   Neck:   Normal range of motion.  Cardiovascular:  Normal rate, regular rhythm, normal heart sounds and intact distal pulses.           No murmur heard.  Pulmonary/Chest: Breath sounds normal. No respiratory distress. She has no wheezes. She has no rales.   Abdominal: Abdomen is soft. She exhibits no distension. There is no abdominal tenderness. There is no rebound.   Musculoskeletal:         General: No tenderness or edema. Normal range of motion.      Cervical back: Normal range of motion.     Neurological: She is alert. She has normal strength. No cranial nerve deficit. GCS score is 15. GCS eye subscore is 4. GCS verbal subscore is 5. GCS motor subscore is 6.   5/5 upper and lower extremity strength.  Finger-nose intact.   Skin: Skin is warm and dry. Capillary refill takes less than 2 seconds. No rash noted. No erythema.   Psychiatric: She has a normal mood and affect.         ED Course   Procedures  Labs Reviewed   COMPREHENSIVE METABOLIC PANEL - Abnormal; Notable for the following components:       Result Value     Chloride 111 (*)     Carbon Dioxide 21 (*)     Glucose Level 159 (*)     Globulin 4.1 (*)     Albumin/Globulin Ratio 0.9 (*)     All other components within normal limits   CBC WITH DIFFERENTIAL - Abnormal; Notable for the following components:    Hgb 11.5 (*)     MCH 25.4 (*)     MCHC 30.9 (*)     MPV 10.5 (*)     All other components within normal limits   B-TYPE NATRIURETIC PEPTIDE - Normal   CBC W/ AUTO DIFFERENTIAL    Narrative:     The following orders were created for panel order CBC Auto Differential.  Procedure                               Abnormality         Status                     ---------                               -----------         ------                     CBC with Differential[[id number]]       Abnormal            Final result                 Please view results for these tests on the individual orders.   TROPONIN ISTAT     EKG Readings: (Independently Interpreted)   Normal sinus rhythm.  Rate of 99.  Normal intervals.  No STEMI.       Imaging Results              CT Head Without Contrast (Final result)  Result time 03/15/24 19:20:38      Final result by Tomas Li MD (03/15/24 19:20:38)                   Impression:      No acute intracranial abnormality identified.      Electronically signed by: Tomas Li  Date:    03/15/2024  Time:    19:20               Narrative:    EXAMINATION:  CT HEAD WITHOUT CONTRAST    CLINICAL HISTORY:  Dizziness, persistent/recurrent, cardiac or vascular cause suspected;    TECHNIQUE:  Low dose axial images were obtained through the head.  Coronal and sagittal reformations were also performed. Contrast was not administered.    Automatic exposure control was utilized to reduce the patient's radiation dose.    DLP= 1105    COMPARISON:  07/29/2020    FINDINGS:  No acute intracranial hemorrhage, edema or mass. No acute parenchymal abnormality.    There is no hydrocephalus, evidence of herniation or midline shift. The ventricles and sulci are  normal.    There is normal gray white differentiation.    The osseous structures are normal.    The mastoid air cells are clear.    The auditory canals are patent bilaterally.    The globes and orbital contents are normal bilaterally.    The visualized maxillary, ethmoid and sphenoid sinuses are clear.                                       X-Ray Chest 1 View (Final result)  Result time 03/15/24 18:51:54      Final result by Tomas Li MD (03/15/24 18:51:54)                   Impression:      No acute cardiopulmonary process.      Electronically signed by: Tomas Li  Date:    03/15/2024  Time:    18:51               Narrative:    EXAMINATION:  XR CHEST 1 VIEW    CLINICAL HISTORY:  chest pain;    TECHNIQUE:  Single view of the chest    COMPARISON:  09/09/2023    FINDINGS:  No focal opacification, pleural effusion, or pneumothorax.    The cardiomediastinal silhouette is within normal limits.    No acute osseous abnormality.                                       Medications   lactated ringers bolus 2,000 mL (2,000 mLs Intravenous Incomplete 3/15/24 1930)   meclizine tablet 50 mg (50 mg Oral Given 3/15/24 1839)   butalbital-acetaminophen-caffeine -40 mg per tablet 1 tablet (1 tablet Oral Given 3/15/24 2109)     Medical Decision Making  Problems Addressed:  Dizziness: acute illness or injury    Amount and/or Complexity of Data Reviewed  Labs: ordered.  Radiology: ordered and independent interpretation performed.  ECG/medicine tests: ordered and independent interpretation performed.    Risk  OTC drugs.  Prescription drug management.               ED Course as of 03/15/24 2144   Fri Mar 15, 2024   1837 Normal sinus rhythm.  Rate of 99.  Normal intervals.  No STEMI.  Time of 1831 [RP]   1915 POC Cardiac Troponin I: 0.00 [RP]   2133 Patient able to ambulate without difficulty.  No dizziness. [RP]      ED Course User Index  [RP] Anish Abarca MD               Medical Decision Making:   History:   I  obtained history from: someone other than patient.       <> Summary of History: Collateral from family  Old Medical Records: I decided to obtain old medical records.  Old Records Summarized: records from clinic visits, records from another hospital and records from previous admission(s).       <> Summary of Records: Reviewed recent ED records  Initial Assessment:   Dizziness  Differential Diagnosis:   Judging by the patient's chief complaint and pertinent history, the patient has the following possible differential diagnoses, including but not limited to the following.  Some of these are deemed to be lower likelihood and some more likely based on my physical exam and history combined with possible lab work and/or imaging studies.   Please see the pertinent studies, and refer to the HPI.  Some of these diagnoses will take further evaluation to fully rule out, perhaps as an outpatient and the patient was encouraged to follow up when discharged for more comprehensive evaluation.    BPPV, vertigo, sinus infection, Anemia, dehydration, electrolyte derangement, hypoglycemia, infection, intoxication, respiratory failure, toxic ingestion, ACS, thyroid disease, medications, , autoimmune, myositis, peripheral neuropathy  CVA, ICH,   Independently Interpreted Test(s):   I have ordered and independently interpreted X-rays - see prior notes.  I have ordered and independently interpreted EKG Reading(s) - see prior notes  Clinical Tests:   Lab Tests: Ordered and Reviewed  Radiological Study: Ordered and Reviewed  Medical Tests: Ordered and Reviewed  ED Management:  Patient is a 53-year-old female presents to emergency department for dizziness, some left ear pain and a mild headache.  See HPI.  See physical exam.  Dizziness has been ongoing for approximately 1 week.  It was intermittent.  States she was taking some meclizine Decadron without relief.  She was not had any previous imaging to rule out CVA.  She does not have any upper  or lower extremity weakness.  Symmetric smile.  Cranial nerves 2-12 appear to be intact.  Hints exam consistent with likely peripheral etiology however given that this being the 3rd presentation to a facility, CT of the head obtained.  CT of the head without any evidence of CVA, ICH, or other intracranial abnormality.  Normal TMs bilaterally.  EKG without evidence of dysrhythmia.  Labs as noted.  Within normal limits.  Troponin within normal limits.  Lungs clear to auscultation bilaterally.  Workup otherwise unremarkable.  Given IV fluids, meclizine, Fioricet.  On reassessment patient able to ambulate without difficulty.  All results discussed with the patient.  Discussed need for follow-up with ENT.  Discussed need for follow-up with primary care provider.  Given that she was no other weakness, numbness, or any other symptoms low suspicion for any CVA.  Should her symptoms persist you may require MRI of the head for further evaluation.  All results discussed with the patient and family.  Strict return precautions.  Answered all questions time.  On reassessment patient able to ambulate without difficulty able to ambulate around the emergency department without any dizziness, shortness of breath, chest pain, or any other symptoms.  Headache resolved.  No acute symptoms on reassessment.  Resting comfortably.  Hemodynamically stable for continued outpatient management strict return precautions.             Clinical Impression:  Final diagnoses:  [R42] Dizziness  [R42] Vertigo (Primary)  [R51.9] Nonintractable headache, unspecified chronicity pattern, unspecified headache type                 Anish Abarca MD  03/15/24 9781

## 2024-03-16 NOTE — DISCHARGE INSTRUCTIONS
Follow-up with the primary care physician.      You will need to follow-up with ENT.  Please see list of ENT providers.      If your dizziness persists or worsens you may require MRI of your head.  Please follow-up with your primary care physician.    Return to the emergency department for any new or worsening symptoms, nausea, vomiting, difficulty breathing, fever, headache, or any other concerns.

## 2024-03-19 LAB
OHS QRS DURATION: 74 MS
OHS QTC CALCULATION: 433 MS

## 2024-03-25 ENCOUNTER — HOSPITAL ENCOUNTER (EMERGENCY)
Facility: HOSPITAL | Age: 54
Discharge: HOME | End: 2024-03-25
Attending: EMERGENCY MEDICINE
Payer: MEDICAID

## 2024-03-25 ENCOUNTER — TELEPHONE (OUTPATIENT)
Dept: FAMILY MEDICINE | Facility: CLINIC | Age: 54
End: 2024-03-25
Payer: MEDICAID

## 2024-03-25 VITALS
DIASTOLIC BLOOD PRESSURE: 71 MMHG | OXYGEN SATURATION: 99 % | RESPIRATION RATE: 20 BRPM | SYSTOLIC BLOOD PRESSURE: 102 MMHG | HEIGHT: 63 IN | BODY MASS INDEX: 37.57 KG/M2 | HEART RATE: 87 BPM | TEMPERATURE: 97 F | WEIGHT: 212.06 LBS

## 2024-03-25 DIAGNOSIS — R42 VERTIGO: Primary | ICD-10-CM

## 2024-03-25 PROCEDURE — 99281 EMR DPT VST MAYX REQ PHY/QHP: CPT

## 2024-03-25 NOTE — TELEPHONE ENCOUNTER
Reported to patient and  Des labs within normal limits.  Referral sent to [facility] for vertigo.  If they decide they want a different provider they will check their insurance and call with the name of provider on their insurance.

## 2024-03-25 NOTE — TELEPHONE ENCOUNTER
----- Message from SARAI Ramos sent at 3/25/2024  2:49 PM CDT -----  Pts labs are  WNL- referral will be placed to [facility]   ----- Message -----  From: Trina Macedo LPN  Sent: 3/25/2024   2:15 PM CDT  To: SARAI Ramos    Patient calling again for lab results from 03/14/2024 and ENT referral (who would you recommend), ER visit x 2 for vertigo (one today).    thanks  ----- Message -----  From: Uzma Layne  Sent: 3/25/2024   2:03 PM CDT  To: Sarah Ramos Staff    Pt. Ask nurse for a call back at [phone].

## 2024-03-25 NOTE — ED PROVIDER NOTES
Encounter Date: 3/25/2024       History     Chief Complaint   Patient presents with    Ear Fullness     Reports she feels like her left ear is draining and dizzy. Would like a referral to an ENT because her doctor is not helping. Was seen recently for same complaint     This 53-year-old female presents for the 3rd time with complaints of dizziness.  States feels like she is drunk.  She requests a referral to the Ear Nose and Throat doctor.  She has been seen in the clinic in twice in the emergency room and diagnosed with vertigo on all visits.  She states her symptoms are not improving.  She does admit to some rhinorrhea and a feeling of fluid in her left ear.  She has a occasional ringing in the left ear but no hearing loss.  No neurologic deficits.       Review of patient's allergies indicates:   Allergen Reactions    Aspirin      Other reaction(s): Bleeding  unable to take due to Gastric Bypass    Penicillin Rash     Past Medical History:   Diagnosis Date    Arthritis     Asthma     Esophageal spasm     GERD (gastroesophageal reflux disease)     Hidradenitis axillaris     bilateral, had surgery on right    Insulin resistance      Past Surgical History:   Procedure Laterality Date    AXILLARY HIDRADENITIS EXCISION  in 10/2018    Dr. Payan in [facility], right side, wound dehisced    BILATERAL TUBAL LIGATION       SECTION      CHOLECYSTECTOMY      COLONOSCOPY  2021    GASTRIC BYPASS      LAPAROSCOPIC GASTRIC BANDING      [place], [place]    TONSILLECTOMY       Family History   Problem Relation Age of Onset    Hypertension Mother     Diabetes Mother     Mitral valve prolapse Mother     Hypertension Father     Diabetes Father     Kidney cancer Father      Social History     Tobacco Use    Smoking status: Never    Smokeless tobacco: Never   Substance Use Topics    Alcohol use: Yes     Comment: occasionally    Drug use: Never     Review of Systems   Constitutional:  Negative for fever.   HENT:   Positive for rhinorrhea. Negative for sore throat.    Respiratory:  Negative for shortness of breath.    Cardiovascular:  Negative for chest pain.   Gastrointestinal:  Negative for nausea.   Genitourinary:  Negative for dysuria.   Musculoskeletal:  Negative for back pain.   Skin:  Negative for rash.   Neurological:  Positive for dizziness. Negative for weakness.   Hematological:  Does not bruise/bleed easily.       Physical Exam     Initial Vitals [03/25/24 1329]   BP Pulse Resp Temp SpO2   102/71 87 20 97.3 °F (36.3 °C) 99 %      MAP       --         Physical Exam    Nursing note and vitals reviewed.  Constitutional: She appears well-developed and well-nourished.   HENT:   Head: Normocephalic and atraumatic.   Eyes: Conjunctivae and EOM are normal. Pupils are equal, round, and reactive to light.   Neck: Neck supple.   Normal range of motion.  Cardiovascular:  Normal rate, regular rhythm, normal heart sounds and intact distal pulses.           Pulmonary/Chest: Breath sounds normal.   Abdominal: Abdomen is soft. Bowel sounds are normal.   Musculoskeletal:         General: Normal range of motion.      Cervical back: Normal range of motion and neck supple.     Neurological: She is alert and oriented to person, place, and time. She has normal strength.   Skin: Skin is warm and dry. Capillary refill takes less than 2 seconds.   Psychiatric: She has a normal mood and affect. Her behavior is normal. Judgment and thought content normal.         ED Course   Procedures  Labs Reviewed - No data to display       Imaging Results    None          Medications - No data to display  Medical Decision Making                                    Clinical Impression:  Final diagnoses:  [R42] Vertigo (Primary)          ED Disposition Condition    Discharge Stable          ED Prescriptions    None       Follow-up Information       Follow up With Specialties Details Why Contact Info    Rachel Smith, SARAI Family Medicine Schedule an  appointment as soon as possible for a visit   [address] [facility] [address]  [phone]      Cruz Reid MD Otolaryngology Schedule an appointment as soon as possible for a visit   [address] [facility] [address]  [phone]               Celso Martinez MD  03/25/24 1862

## 2024-04-01 ENCOUNTER — OFFICE VISIT (OUTPATIENT)
Dept: OTOLARYNGOLOGY | Facility: CLINIC | Age: 54
End: 2024-04-01
Payer: MEDICAID

## 2024-04-01 DIAGNOSIS — R42 DIZZINESS: ICD-10-CM

## 2024-04-01 DIAGNOSIS — M26.629 ARTHRALGIA OF TEMPOROMANDIBULAR JOINT, UNSPECIFIED LATERALITY: ICD-10-CM

## 2024-04-01 DIAGNOSIS — J30.9 ALLERGIC RHINITIS, UNSPECIFIED SEASONALITY, UNSPECIFIED TRIGGER: ICD-10-CM

## 2024-04-01 DIAGNOSIS — R55 NEAR SYNCOPE: ICD-10-CM

## 2024-04-01 DIAGNOSIS — G43.109 VERTIGINOUS MIGRAINE: Primary | ICD-10-CM

## 2024-04-01 PROCEDURE — 1159F MED LIST DOCD IN RCRD: CPT | Mod: CPTII,95,, | Performed by: NURSE PRACTITIONER

## 2024-04-01 PROCEDURE — 3044F HG A1C LEVEL LT 7.0%: CPT | Mod: CPTII,95,, | Performed by: NURSE PRACTITIONER

## 2024-04-01 PROCEDURE — 99203 OFFICE O/P NEW LOW 30 MIN: CPT | Mod: 95,,, | Performed by: NURSE PRACTITIONER

## 2024-04-01 RX ORDER — FLUTICASONE PROPIONATE 50 MCG
2 SPRAY, SUSPENSION (ML) NASAL 2 TIMES DAILY
Qty: 16 G | Refills: 11 | Status: SHIPPED | OUTPATIENT
Start: 2024-04-01 | End: 2024-05-01

## 2024-04-01 NOTE — PROGRESS NOTES
Audio Only Telehealth Visit     The patient location is: [facility]  The chief complaint leading to consultation is: dizziness  Visit type: Virtual visit with audio only (telephone)  Total time spent on visit: 32 min     The reason for the audio only service rather than synchronous audio and video virtual visit was related to technical difficulties or patient preference/necessity.     Each patient to whom I provide medical services by telemedicine is:  (1) informed of the relationship between the physician and patient and the respective role of any other health care provider with respect to management of the patient; and (2) notified that they may decline to receive medical services by telemedicine and may withdraw from such care at any time. Patient verbally consented to receive this service via voice-only telephone call.       HPI: 04/01/2024: 53 y.o. female referred for dizziness. This began last month and occurs daily. States she has been seen in the ED multiple times and told she has vertigo. States she is taking meclizine as prescribed but that it is not helping. She c/o headaches and left otalgia which radiates across the back of her head to her right ear. Describes dizziness as feeling drunk and as if she is going to pass out. States she gets very dizzy when she turns in the bed in any direction. Also reports that sometimes when she stands to walk, she sees black and feels that she is going to fall to the floor. Admits that she always has severe headaches associated with dizzy episodes, that she has been struggling with migraines. Episodes last about an hour. States she goes to sleep and feels it has passed when she wakes up. States she has a known hx of TMJ and does not wear a . Denies HL, tinnitus, or previous otologic hx. Denies any medication changes, head trauma, or MVC around the time dizziness began. States she saw cardiology a few months ago and was told her heart is fine. Reports she was  given tylenol by ED for headaches. Unable to take NSAIDs r/t her gastric bypass. Also with seasonal nasal congestion and rhinitis.    Imaging:   CT HEAD WITHOUT CONTRAST     CLINICAL HISTORY:  Dizziness, persistent/recurrent, cardiac or vascular cause suspected;     TECHNIQUE:  Low dose axial images were obtained through the head.  Coronal and sagittal reformations were also performed. Contrast was not administered.     Automatic exposure control was utilized to reduce the patient's radiation dose.     DLP= 1105     COMPARISON:  07/29/2020     FINDINGS:  No acute intracranial hemorrhage, edema or mass. No acute parenchymal abnormality.     There is no hydrocephalus, evidence of herniation or midline shift. The ventricles and sulci are normal.     There is normal gray white differentiation.     The osseous structures are normal.     The mastoid air cells are clear.     The auditory canals are patent bilaterally.     The globes and orbital contents are normal bilaterally.     The visualized maxillary, ethmoid and sphenoid sinuses are clear.     Impression:     No acute intracranial abnormality identified.        Electronically signed by: Tomas Li  Date:                                            03/15/2024     Assessment and plan:  53 y.o. female referred for dizziness, likely multifactorial. Discussed that she should speak with PCP regarding headache mgmt as she sounds to be experiencing vertiginous migraines. However, near syncopal dizziness would now be c/w this or prashanth-vestibular etiology and would recommend further workup. CT head 3/15/24 unremarkable- reassurance provided there are no signs of middle ear infection or fluid. Also with AR, TMJ.   - Flonase BID   - Conservative mgmt of TMJ  - Encouraged to purchase OTC  and wear QHS  - Vestibular exercises  - F/u with PCP regarding h/a mgmt and near syncope workup  - Will consider VNG if vertiginous symptoms persist  - RTC 3-4mo    Jeanne Don  NP      This service was not originating from a related E/M service provided within the previous 7 days nor will  to an E/M service or procedure within the next 24 hours or my soonest available appointment.  Prevailing standard of care was able to be met in this audio-only visit.

## 2024-04-05 DIAGNOSIS — R42 DIZZINESS: Primary | ICD-10-CM

## 2024-04-05 DIAGNOSIS — Z82.0 FAMILY HISTORY OF SEIZURE DISORDER: ICD-10-CM

## 2024-04-10 DIAGNOSIS — Z86.39 H/O DIABETES MELLITUS: ICD-10-CM

## 2024-04-10 DIAGNOSIS — E16.2 LOW BLOOD SUGAR READING: ICD-10-CM

## 2024-04-10 RX ORDER — CALCIUM CITRATE/VITAMIN D3 200MG-6.25
TABLET ORAL
Qty: 100 EACH | Refills: 0 | Status: SHIPPED | OUTPATIENT
Start: 2024-04-10

## 2024-04-24 ENCOUNTER — LAB VISIT (OUTPATIENT)
Dept: LAB | Facility: HOSPITAL | Age: 54
End: 2024-04-24
Attending: FAMILY MEDICINE
Payer: MEDICAID

## 2024-04-24 DIAGNOSIS — J45.998 ALLERGIC-INFECTIVE ASTHMA: ICD-10-CM

## 2024-04-24 DIAGNOSIS — G47.00 PERSISTENT DISORDER OF INITIATING OR MAINTAINING SLEEP: ICD-10-CM

## 2024-04-24 DIAGNOSIS — K21.9 GASTROESOPHAGEAL REFLUX DISEASE, UNSPECIFIED WHETHER ESOPHAGITIS PRESENT: ICD-10-CM

## 2024-04-24 DIAGNOSIS — Z82.0 FAMILY HISTORY OF NEUROLOGICAL DISEASE: ICD-10-CM

## 2024-04-24 DIAGNOSIS — M54.50 LUMBAGO: ICD-10-CM

## 2024-04-24 DIAGNOSIS — E66.9 OBESITY, UNSPECIFIED CLASSIFICATION, UNSPECIFIED OBESITY TYPE, UNSPECIFIED WHETHER SERIOUS COMORBIDITY PRESENT: ICD-10-CM

## 2024-04-24 DIAGNOSIS — F90.0 ADHD, PREDOMINANTLY INATTENTIVE TYPE: Primary | ICD-10-CM

## 2024-04-24 DIAGNOSIS — L73.2 HIDRADENITIS: ICD-10-CM

## 2024-04-24 DIAGNOSIS — R51.9 FACIAL PAIN: ICD-10-CM

## 2024-04-24 DIAGNOSIS — K59.00 COLONIC CONSTIPATION: ICD-10-CM

## 2024-04-24 DIAGNOSIS — R42 DIZZINESS AND GIDDINESS: ICD-10-CM

## 2024-04-24 DIAGNOSIS — E55.9 AVITAMINOSIS D: ICD-10-CM

## 2024-04-24 DIAGNOSIS — R55 SYNCOPE AND COLLAPSE: ICD-10-CM

## 2024-04-24 DIAGNOSIS — M54.2 CERVICALGIA: ICD-10-CM

## 2024-04-24 LAB
FOLATE SERPL-MCNC: 8.9 NG/ML (ref 7–31.4)
VIT B12 SERPL-MCNC: 326 PG/ML (ref 213–816)

## 2024-04-24 PROCEDURE — 36415 COLL VENOUS BLD VENIPUNCTURE: CPT

## 2024-04-24 PROCEDURE — 82607 VITAMIN B-12: CPT

## 2024-04-24 PROCEDURE — 82746 ASSAY OF FOLIC ACID SERUM: CPT

## 2024-05-29 ENCOUNTER — OFFICE VISIT (OUTPATIENT)
Dept: OTOLARYNGOLOGY | Facility: CLINIC | Age: 54
End: 2024-05-29
Payer: MEDICAID

## 2024-05-29 VITALS — TEMPERATURE: 98 F | SYSTOLIC BLOOD PRESSURE: 113 MMHG | HEART RATE: 92 BPM | DIASTOLIC BLOOD PRESSURE: 83 MMHG

## 2024-05-29 DIAGNOSIS — H91.90 HEARING DISORDER, UNSPECIFIED LATERALITY: ICD-10-CM

## 2024-05-29 DIAGNOSIS — R42 DIZZINESS: Primary | ICD-10-CM

## 2024-05-29 DIAGNOSIS — R42 DYSEQUILIBRIUM: ICD-10-CM

## 2024-05-29 DIAGNOSIS — R51.9 FREQUENT HEADACHES: ICD-10-CM

## 2024-05-29 DIAGNOSIS — J30.9 ALLERGIC RHINITIS, UNSPECIFIED SEASONALITY, UNSPECIFIED TRIGGER: ICD-10-CM

## 2024-05-29 DIAGNOSIS — R55 NEAR SYNCOPE: ICD-10-CM

## 2024-05-29 PROCEDURE — 99214 OFFICE O/P EST MOD 30 MIN: CPT | Mod: PBBFAC | Performed by: NURSE PRACTITIONER

## 2024-05-29 PROCEDURE — 3074F SYST BP LT 130 MM HG: CPT | Mod: CPTII,,, | Performed by: NURSE PRACTITIONER

## 2024-05-29 PROCEDURE — 3079F DIAST BP 80-89 MM HG: CPT | Mod: CPTII,,, | Performed by: NURSE PRACTITIONER

## 2024-05-29 PROCEDURE — 99214 OFFICE O/P EST MOD 30 MIN: CPT | Mod: S$PBB,,, | Performed by: NURSE PRACTITIONER

## 2024-05-29 PROCEDURE — 3044F HG A1C LEVEL LT 7.0%: CPT | Mod: CPTII,,, | Performed by: NURSE PRACTITIONER

## 2024-05-29 PROCEDURE — 1159F MED LIST DOCD IN RCRD: CPT | Mod: CPTII,,, | Performed by: NURSE PRACTITIONER

## 2024-05-29 RX ORDER — ADALIMUMAB 40MG/0.4ML
KIT SUBCUTANEOUS
COMMUNITY

## 2024-05-29 RX ORDER — AMMONIUM LACTATE 12 G/100G
LOTION TOPICAL
COMMUNITY
Start: 2024-05-07

## 2024-05-29 RX ORDER — AMMONIUM LACTATE 12 G/100G
CREAM TOPICAL 2 TIMES DAILY
COMMUNITY
Start: 2024-05-07 | End: 2025-05-07

## 2024-05-29 RX ORDER — HYDROXYZINE PAMOATE 25 MG/1
25 CAPSULE ORAL NIGHTLY PRN
COMMUNITY
Start: 2024-04-24

## 2024-05-29 RX ORDER — CETIRIZINE HYDROCHLORIDE 10 MG/1
10 TABLET, FILM COATED ORAL NIGHTLY
COMMUNITY
Start: 2024-03-27

## 2024-05-29 RX ORDER — ESOMEPRAZOLE MAGNESIUM 40 MG/1
40 CAPSULE, DELAYED RELEASE ORAL
COMMUNITY
Start: 2024-04-24

## 2024-05-29 RX ORDER — TOPIRAMATE 25 MG/1
25 TABLET ORAL NIGHTLY
COMMUNITY
Start: 2024-05-04

## 2024-05-29 RX ORDER — ERGOCALCIFEROL 1.25 MG/1
50000 CAPSULE ORAL
COMMUNITY
Start: 2024-05-16

## 2024-05-29 NOTE — PROGRESS NOTES
[facility]  Otolaryngology Clinic Note    Lilian Arroyo  YOB: 1970    Chief Complaint:   Chief Complaint   Patient presents with    Follow-up        HPI: 04/01/2024: 53 y.o. female referred for dizziness. This began last month and occurs daily. States she has been seen in the ED multiple times and told she has vertigo. States she is taking meclizine as prescribed but that it is not helping. She c/o headaches and left otalgia which radiates across the back of her head to her right ear. Describes dizziness as feeling drunk and as if she is going to pass out. States she gets very dizzy when she turns in the bed in any direction. Also reports that sometimes when she stands to walk, she sees black and feels that she is going to fall to the floor. Admits that she always has severe headaches associated with dizzy episodes, that she has been struggling with migraines. Episodes last about an hour. States she goes to sleep and feels it has passed when she wakes up. States she has a known hx of TMJ and does not wear a . Denies HL, tinnitus, or previous otologic hx. Denies any medication changes, head trauma, or MVC around the time dizziness began. States she saw cardiology a few months ago and was told her heart is fine. Reports she was given tylenol by ED for headaches. Unable to take NSAIDs r/t her gastric bypass. Also with seasonal nasal congestion and rhinitis.    05/29/2024: She was place on topamax by PCP for headaches and feels it has helped a little. Continues to have headaches, usually in the morning.  reports Mrs. Arroyo has a learning disability and sometimes needs help with history. Headaches are not always associated with dizziness. She still experiences near syncope but reports she also has different episodes of dizziness where she feels drunk and loses her balance. These typically pass within 10-20min after she sits down. They are not exacerbated by movement.  Also with ear itching and occasional pressure around her nose. Has been taking zyrtec daily & using flonase once a day.    ROS:   10-point review of systems negative except per HPI      Review of patient's allergies indicates:   Allergen Reactions    Aspirin      Other reaction(s): Bleeding  unable to take due to Gastric Bypass    Penicillin Rash       Past Medical History:   Diagnosis Date    Arthritis     Asthma     Esophageal spasm     GERD (gastroesophageal reflux disease)     Hidradenitis axillaris     bilateral, had surgery on right    Insulin resistance        Past Surgical History:   Procedure Laterality Date    AXILLARY HIDRADENITIS EXCISION  in 10/2018    Dr. Payan in [facility], right side, wound dehisced    BILATERAL TUBAL LIGATION       SECTION      CHOLECYSTECTOMY      COLONOSCOPY  2021    GASTRIC BYPASS      LAPAROSCOPIC GASTRIC BANDING  2006    [place], [place]    TONSILLECTOMY         Social History     Socioeconomic History    Marital status:    Tobacco Use    Smoking status: Never    Smokeless tobacco: Never   Substance and Sexual Activity    Alcohol use: Yes     Comment: occasionally    Drug use: Never    Sexual activity: Not Currently     Social Determinants of Health     Financial Resource Strain: Low Risk  (2023)    [facility]    Overall Financial Resource Strain (CARDIA)     Difficulty of Paying Living Expenses: Not very hard   Food Insecurity: No Food Insecurity (2023)    [facility]    Hunger Vital Sign     Worried About Running Out of Food in the Last Year: Never true     Ran Out of Food in the Last Year: Never true   Transportation Needs: No Transportation Needs (2023)    [facility]    PRAPARE - Transportation     Lack of Transportation (Medical): No     Lack of Transportation (Non-Medical): No   Physical Activity: Insufficiently Active (2023)    Received from [facility]    Exercise Vital Sign     Days of Exercise per Week: 1 day     Minutes of Exercise per Session: 20 min   Stress: No Stress Concern Present (2023)    [facility]    [employer] [place] [employer] - Occupational Stress Questionnaire     Feeling of Stress : Not at all   Housing Stability: Low Risk  (2023)    [facility]    Housing Stability Vital Sign     Unable to Pay for Housing in the Last Year: No     Number of Places Lived in the Last Year: 1     In the last 12 months, was there a time when you did not have a steady place to sleep or slept in a shelter (including now)?: No       Family History   Problem Relation Name Age of Onset    Hypertension Mother      Diabetes Mother      Mitral valve prolapse Mother      Hypertension Father      Diabetes Father      Kidney cancer Father         Outpatient Encounter Medications as of 2024   Medication Sig Dispense Refill    albuterol (PROVENTIL/VENTOLIN HFA) 90 mcg/actuation inhaler inhale1 puff by mouth 4 times per day as needed for wheezing 18 g 2    ALLERGY RELIEF, CETIRIZINE, 10 mg tablet Take 10 mg by mouth every evening.      ammonium lactate (LAC-HYDRIN) 12 % lotion APPLY TO THE AFFECTED AREA(S) TWICE DAILY      blood-glucose meter kit To check BG 2 times daily, to use with insurance preferred meter 1 each 0    blood-glucose meter,continuous (DEXCOM ) Misc 1 each by Misc.(Non-Drug; Combo Route) route Daily. 1 each 11    cyanocobalamin (VITAMIN B-12) 100 MCG tablet Take 1 tablet by mouth once daily.      docusate sodium (COLACE) 100 MG capsule TAKE ONE CAPSULE BY MOUTH TWICE DAILY 60 capsule 1    ergocalciferol (ERGOCALCIFEROL) 50,000 unit Cap Take 50,000 Units by mouth every 7 days.      esomeprazole (NEXIUM) 40 MG capsule Take 40 mg by mouth.      HUMIRA,CF, PEN 40 mg/0.4 mL PnKt SMARTSI Milligram(s) SUB-Q Once a Week      hydrOXYzine pamoate (VISTARIL) 25  MG Cap Take 25 mg by mouth nightly as needed.      lancets (TRUEPLUS LANCETS) 30 gauge Misc USE TO check sugar TWICE DAILY 200 each 0    meclizine (ANTIVERT) 25 mg tablet TAKE ONE TABLET BY MOUTH THREE TIMES DAILY as needed for dizziness Strength: 25 mg 30 tablet 1    multivitamin with minerals tablet Take by mouth.      topiramate (TOPAMAX) 25 MG tablet Take 25 mg by mouth every evening.      TRUE METRIX GLUCOSE TEST STRIP Strp test once daily  2    TRUE METRIX GLUCOSE TEST STRIP Strp USE TO check sugar TWICE DAILY 100 each 0    venlafaxine (EFFEXOR-XR) 150 MG Cp24 Take 1 capsule (150 mg total) by mouth once daily. 30 capsule 2    adalimumab (HUMIRA) 40 mg/0.8 mL SyKt injection Inject 40 mg into the skin. (Patient not taking: Reported on 5/29/2024)      ammonium lactate 12 % Crea 2 (two) times daily. (Patient not taking: Reported on 5/29/2024)      mupirocin (BACTROBAN) 2 % ointment Apply topically 3 (three) times daily. (Patient not taking: Reported on 5/29/2024)      sucralfate (CARAFATE) 100 mg/mL suspension Take 1 g by mouth 4 (four) times daily before meals and nightly. (Patient not taking: Reported on 5/29/2024)      zinc gluconate 50 mg tablet Take 50 mg by mouth once daily. (Patient not taking: Reported on 5/29/2024)       No facility-administered encounter medications on file as of 5/29/2024.       Physical Exam:  Vitals:    05/29/24 1027   BP: 113/83   BP Location: Right arm   Patient Position: Sitting   BP Method: Large (Automatic)   Pulse: 92   Temp: 98 °F (36.7 °C)   TempSrc: Oral       Physical Exam   General: NAD, voice normal  Neuro: AAO, CN II - XII grossly intact  Head/ Face: NCAT, symmetric, sensations intact bilaterally  Eyes: EOMI, PERRL  Ears: externally normal with grossly normal hearing  AD: EAC patent, TM intact, no middle ear effusion, no retractions  AS: EAC patent, TM intact, no middle ear effusion, no retractions  Nose: bilateral nares patent, midline septum, no rhinorrhea, no external  deformity, +ITH  OC/OP: MMM, no intraoral lesions, no trismus, dentition is poor, no uvular deviation, bilaterally symmetric soft palate elevation, palatoglossus and palatopharyngeal fold wnl; tonsils are symmetric and 1+  Indirect laryngoscopy: deferred due to patient intolerance  Neck: soft, supple, no LAD, normal ROM, no thyromegaly  Respiratory: nonlabored, no wheezing, bilateral chest rise  Cardiovascular: RRR  Gastrointestinal: S NT ND  Skin: warm, no lesions  Musculoskeletal: 5/5 strength  Psych: Appropriate affect/mood     Pertinent Data:  ? LABS:  ? AUDIO:           ? PATH:      Imaging:   I personally reviewed the following images:        Assessment/Plan:  53 y.o. female referred for dizziness, likely multifactorial. CT head 3/15/24 with right septal deviaton and dorsal deflection. Also with AR.  - Increase flonase to BID (reviewed technique)  - Continue zyrtec  - Ok to continue meclizine prn  - F/u with PCP regarding headaches and near syncope  - Baseline audio + vestibular workup  - RTC 8 wks    Jeanne Don NP

## 2024-06-06 ENCOUNTER — DOCUMENTATION ONLY (OUTPATIENT)
Dept: AUDIOLOGY | Facility: HOSPITAL | Age: 54
End: 2024-06-06
Payer: MEDICAID

## 2024-06-06 NOTE — PROGRESS NOTES
Spoke with patient in depth regarding VNG pre-test protocol and current medications. Informed her of the need to discontinue Meclizine by 6/24/24 in order to proceed with testing on 6/26/2024.  Also, ensured Mrs. Arroyo we will explain all testing and aspects of testing/instructions in grave detail.

## 2024-06-26 ENCOUNTER — CLINICAL SUPPORT (OUTPATIENT)
Dept: AUDIOLOGY | Facility: HOSPITAL | Age: 54
End: 2024-06-26
Payer: MEDICAID

## 2024-06-26 DIAGNOSIS — R42 DIZZINESS: ICD-10-CM

## 2024-06-26 DIAGNOSIS — H81.90 DISORDER OF VESTIBULAR FUNCTION, UNSPECIFIED LATERALITY: Primary | ICD-10-CM

## 2024-06-26 DIAGNOSIS — H91.90 HEARING DISORDER, UNSPECIFIED LATERALITY: ICD-10-CM

## 2024-06-26 PROCEDURE — 92557 COMPREHENSIVE HEARING TEST: CPT | Performed by: AUDIOLOGIST-HEARING AID FITTER

## 2024-06-26 PROCEDURE — 92567 TYMPANOMETRY: CPT | Performed by: AUDIOLOGIST-HEARING AID FITTER

## 2024-06-26 PROCEDURE — 92540 BASIC VESTIBULAR EVALUATION: CPT | Performed by: AUDIOLOGIST-HEARING AID FITTER

## 2024-06-26 PROCEDURE — 92538 CALORIC VSTBLR TEST W/REC: CPT | Performed by: AUDIOLOGIST-HEARING AID FITTER

## 2024-06-26 NOTE — PROGRESS NOTES
Audiological/Vestibular Evaluation    Patient is a 53 year old female presenting with symptoms of episodic wooziness and balance difficulties.  She endorses years of experiencing some type of dizziness, however wooziness/drunk sensation has reportedly worsened over the previous 3-4 months due to unknown etiology.  Mrs. Arroyo endorses the symptoms are usually associated with headaches, nausea and sensation of near syncope. She reports falling on numerous occasions as a result of the dizziness. The symptoms occur daily and without warning lasting for a duration of minutes to often hours.  This may occur with changes in position as well as while remaining still in sitting position.  She denies a change in medication as well as any new medical diagnosis.         Pure Tone Testing:     Right ear:   Normal hearing sensitivity for the frequencies 250-8000 Hz    Left ear: Normal hearing sensitivity for the frequencies 250-8000 Hz    Tympanometry:      Right ear:   Type 'A' tympanogram     Left ear: Type 'A' tympanogram     DPOAE Testing:    Right ear: Present emissions 4371-3325 Hz    Left ear:  Present emissions [id number] Hz        Vestibular Results:    Spontaneous: Right beat nystagmus noted for sitting condition with vision denied; fixation was noted.  Gaze:   No gaze-evoked nystagmus.    Saccades:   Normal saccade velocity, latency and accuracy.   Tracking: Questionable tracking accuracy and gain. (Likely from lack of attention)  OPK:    No asymmetry noted.   Positionals: Left beat nystagmus noted for static head left and body left conditions only with vision denied.  Positioning: No rotary nystagmus noted for DHP maneuver head right/left.   Calorics: Within normal limits for cool air irrigations    Caloric vestibular responses: RC 8 RW *, LW *, LC 12 deg/s. (Warm air not performed as patient could not tolerate temperature)        Interpretations:    Pure tone testing revealed normal hearing sensitivity for  the frequencies 250-8000 Hz, bilaterally.  Speech reception thresholds were obtained at 15 dB HL (right ear) and 10 dB HL (left ear) consistent with pure tone testing.  Word recognition scores were excellent, bilaterally. Tympanometry testing revealed Type A tympanograms, bilaterally, indicative of normal middle ear function.  DPOAE testing revealed present and robust emissions for the frequencies noted indicative of normal cochlear physiology, bilaterally.  Otoscopy revealed clear EACs, bilaterally.     This patient's videonystagmogram was essentially normal.  Oculomotor testing was essentially within normal limits. Positional testing revealed geotropic nystagmus for (2) conditions with vision denied. Positioning testing did not reveal any type of nystagmus and therefore, negative for canalith involvement.  The caloric irrigation test revealed symmetrical responses for cool air irrigations, bilaterally.  Todays findings indicative of essentially normal peripheral involvement, bilaterally.     Recommendations:        Return to referring ENT for further follow up     Jesus Gutierrez.  Clinical Audiologist

## 2024-07-05 NOTE — PROGRESS NOTES
[facility]  Otolaryngology Clinic Note    Lilian Arroyo  YOB: 1970    Chief Complaint:   Chief Complaint   Patient presents with    Follow-up        HPI: 04/01/2024: 53 y.o. female referred for dizziness. This began last month and occurs daily. States she has been seen in the ED multiple times and told she has vertigo. States she is taking meclizine as prescribed but that it is not helping. She c/o headaches and left otalgia which radiates across the back of her head to her right ear. Describes dizziness as feeling drunk and as if she is going to pass out. States she gets very dizzy when she turns in the bed in any direction. Also reports that sometimes when she stands to walk, she sees black and feels that she is going to fall to the floor. Admits that she always has severe headaches associated with dizzy episodes, that she has been struggling with migraines. Episodes last about an hour. States she goes to sleep and feels it has passed when she wakes up. States she has a known hx of TMJ and does not wear a . Denies HL, tinnitus, or previous otologic hx. Denies any medication changes, head trauma, or MVC around the time dizziness began. States she saw cardiology a few months ago and was told her heart is fine. Reports she was given tylenol by ED for headaches. Unable to take NSAIDs r/t her gastric bypass. Also with seasonal nasal congestion and rhinitis.    05/29/2024: She was place on topamax by PCP for headaches and feels it has helped a little. Continues to have headaches, usually in the morning.  reports Mrs. Arroyo has a learning disability and sometimes needs help with history. Headaches are not always associated with dizziness. She still experiences near syncope but reports she also has different episodes of dizziness where she feels drunk and loses her balance. These typically pass within 10-20min after she sits down. They are not exacerbated by movement.  Also with ear itching and occasional pressure around her nose. Has been taking zyrtec daily & using flonase once a day.    24: F/u after audio & VNG. States symptoms have not changed. Feels she has a left ear infection because her left ear has been hurting.Has a f/u with her PCP next week.     ROS:   10-point review of systems negative except per HPI      Review of patient's allergies indicates:   Allergen Reactions    Aspirin      Other reaction(s): Bleeding  unable to take due to Gastric Bypass    Penicillin Rash       Past Medical History:   Diagnosis Date    Arthritis     Asthma     Esophageal spasm     GERD (gastroesophageal reflux disease)     Hidradenitis axillaris     bilateral, had surgery on right    Insulin resistance        Past Surgical History:   Procedure Laterality Date    AXILLARY HIDRADENITIS EXCISION  in 10/2018    Dr. Payan in [facility], right side, wound dehisced    BILATERAL TUBAL LIGATION       SECTION      CHOLECYSTECTOMY      COLONOSCOPY  2021    GASTRIC BYPASS      LAPAROSCOPIC GASTRIC BANDING      [place], [place]    TONSILLECTOMY         Social History     Socioeconomic History    Marital status:    Tobacco Use    Smoking status: Never    Smokeless tobacco: Never   Substance and Sexual Activity    Alcohol use: Yes     Comment: occasionally    Drug use: Never    Sexual activity: Not Currently     Social Determinants of Health     Financial Resource Strain: Low Risk  (2023)    [facility]    Overall Financial Resource Strain (CARDIA)     Difficulty of Paying Living Expenses: Not very hard   Food Insecurity: No Food Insecurity (2023)    Received from [facility]    Hunger Vital Sign     Worried About Running Out of Food in the Last Year: Never true     Ran Out of Food in the Last Year: Never true   Transportation Needs: No Transportation Needs (2023)    Received from [facility]    PRAPARE -  Transportation     Lack of Transportation (Medical): No     Lack of Transportation (Non-Medical): No   Physical Activity: Insufficiently Active (7/13/2023)    [facility]    Exercise Vital Sign     Days of Exercise per Week: 1 day     Minutes of Exercise per Session: 20 min   Stress: No Stress Concern Present (7/13/2023)    Received from [facility]    [employer] [place] [employer] - Occupational Stress Questionnaire     Feeling of Stress : Not at all   Housing Stability: Low Risk  (7/13/2023)    Received from [facility]    Housing Stability Vital Sign     Unable to Pay for Housing in the Last Year: No     Number of Places Lived in the Last Year: 1     In the last 12 months, was there a time when you did not have a steady place to sleep or slept in a shelter (including now)?: No       Family History   Problem Relation Name Age of Onset    Hypertension Mother      Diabetes Mother      Mitral valve prolapse Mother      Hypertension Father      Diabetes Father      Kidney cancer Father         Outpatient Encounter Medications as of 5/29/2024   Medication Sig Dispense Refill    albuterol (PROVENTIL/VENTOLIN HFA) 90 mcg/actuation inhaler inhale1 puff by mouth 4 times per day as needed for wheezing 18 g 2    ALLERGY RELIEF, CETIRIZINE, 10 mg tablet Take 10 mg by mouth every evening.      ammonium lactate (LAC-HYDRIN) 12 % lotion APPLY TO THE AFFECTED AREA(S) TWICE DAILY      blood-glucose meter kit To check BG 2 times daily, to use with insurance preferred meter 1 each 0    blood-glucose meter,continuous (DEXCOM ) Misc 1 each by Misc.(Non-Drug; Combo Route) route Daily. 1 each 11    cyanocobalamin (VITAMIN B-12) 100 MCG tablet Take 1 tablet by mouth once daily.      docusate sodium (COLACE) 100 MG capsule TAKE ONE CAPSULE BY MOUTH TWICE DAILY 60 capsule 1    ergocalciferol (ERGOCALCIFEROL) 50,000 unit Cap Take 50,000 Units by mouth every 7 days.       esomeprazole (NEXIUM) 40 MG capsule Take 40 mg by mouth.      HUMIRA,CF, PEN 40 mg/0.4 mL PnKt SMARTSI Milligram(s) SUB-Q Once a Week      hydrOXYzine pamoate (VISTARIL) 25 MG Cap Take 25 mg by mouth nightly as needed.      lancets (TRUEPLUS LANCETS) 30 gauge Misc USE TO check sugar TWICE DAILY 200 each 0    meclizine (ANTIVERT) 25 mg tablet TAKE ONE TABLET BY MOUTH THREE TIMES DAILY as needed for dizziness Strength: 25 mg 30 tablet 1    multivitamin with minerals tablet Take by mouth.      topiramate (TOPAMAX) 25 MG tablet Take 25 mg by mouth every evening.      TRUE METRIX GLUCOSE TEST STRIP Strp test once daily  2    TRUE METRIX GLUCOSE TEST STRIP Strp USE TO check sugar TWICE DAILY 100 each 0    venlafaxine (EFFEXOR-XR) 150 MG Cp24 Take 1 capsule (150 mg total) by mouth once daily. 30 capsule 2    adalimumab (HUMIRA) 40 mg/0.8 mL SyKt injection Inject 40 mg into the skin. (Patient not taking: Reported on 2024)      ammonium lactate 12 % Crea 2 (two) times daily. (Patient not taking: Reported on 2024)      mupirocin (BACTROBAN) 2 % ointment Apply topically 3 (three) times daily. (Patient not taking: Reported on 2024)      sucralfate (CARAFATE) 100 mg/mL suspension Take 1 g by mouth 4 (four) times daily before meals and nightly. (Patient not taking: Reported on 2024)      zinc gluconate 50 mg tablet Take 50 mg by mouth once daily. (Patient not taking: Reported on 2024)       No facility-administered encounter medications on file as of 2024.       Physical Exam:  Vitals:    24 1027   BP: 113/83   BP Location: Right arm   Patient Position: Sitting   BP Method: Large (Automatic)   Pulse: 92   Temp: 98 °F (36.7 °C)   TempSrc: Oral       Physical Exam   General: NAD, voice normal  Neuro: AAO, CN II - XII grossly intact  Head/ Face: NCAT, symmetric, sensations intact bilaterally. + marked TTP of TMJ L>R  Eyes: EOMI, PERRL  Ears: externally normal with grossly normal hearing  AD:  EAC patent, TM intact, no middle ear effusion, no retractions  AS: EAC patent, TM intact, no middle ear effusion, no retractions  Nose: bilateral nares patent, midline septum, no rhinorrhea, no external deformity, mild ITH  OC/OP: MMM, no intraoral lesions, no trismus, dentition is poor, no uvular deviation, bilaterally symmetric soft palate elevation, palatoglossus and palatopharyngeal fold wnl; tonsils are symmetric and 1+  Indirect laryngoscopy: deferred due to patient intolerance  Neck: soft, supple, no LAD, normal ROM, no thyromegaly  Respiratory: nonlabored, no wheezing, bilateral chest rise  Cardiovascular: RRR  Gastrointestinal: S NT ND  Skin: warm, no lesions  Musculoskeletal: 5/5 strength  Psych: Appropriate affect/mood     Pertinent Data:  ? LABS:  ? AUDIO:             ? PATH:      Imaging:   I personally reviewed the following images:        Assessment/Plan:  53 y.o. female referred for dizziness, likely multifactorial. CT head 3/15/24 with right septal deviaton and dorsal deflection. Also with AR. Audio WNL with type A tymps, VNG WNL- reviewed.   - Flonase BID   - Continue zyrtec  - Ok to continue meclizine prn  - F/u with PCP regarding headaches, dizziness, & near syncope  - RTC PRN    Jeanne Don NP

## 2024-07-09 ENCOUNTER — OFFICE VISIT (OUTPATIENT)
Dept: OTOLARYNGOLOGY | Facility: CLINIC | Age: 54
End: 2024-07-09
Payer: MEDICAID

## 2024-07-09 VITALS — SYSTOLIC BLOOD PRESSURE: 106 MMHG | DIASTOLIC BLOOD PRESSURE: 76 MMHG | TEMPERATURE: 99 F | HEART RATE: 92 BPM

## 2024-07-09 DIAGNOSIS — M26.629 ARTHRALGIA OF TEMPOROMANDIBULAR JOINT, UNSPECIFIED LATERALITY: ICD-10-CM

## 2024-07-09 DIAGNOSIS — R51.9 FREQUENT HEADACHES: ICD-10-CM

## 2024-07-09 DIAGNOSIS — R42 DIZZINESS: Primary | ICD-10-CM

## 2024-07-09 DIAGNOSIS — R55 NEAR SYNCOPE: ICD-10-CM

## 2024-07-09 PROCEDURE — 3078F DIAST BP <80 MM HG: CPT | Mod: CPTII,,, | Performed by: NURSE PRACTITIONER

## 2024-07-09 PROCEDURE — 1159F MED LIST DOCD IN RCRD: CPT | Mod: CPTII,,, | Performed by: NURSE PRACTITIONER

## 2024-07-09 PROCEDURE — 3074F SYST BP LT 130 MM HG: CPT | Mod: CPTII,,, | Performed by: NURSE PRACTITIONER

## 2024-07-09 PROCEDURE — 3044F HG A1C LEVEL LT 7.0%: CPT | Mod: CPTII,,, | Performed by: NURSE PRACTITIONER

## 2024-07-09 PROCEDURE — 99214 OFFICE O/P EST MOD 30 MIN: CPT | Mod: PBBFAC | Performed by: NURSE PRACTITIONER

## 2024-07-09 PROCEDURE — 99213 OFFICE O/P EST LOW 20 MIN: CPT | Mod: S$PBB,,, | Performed by: NURSE PRACTITIONER

## 2024-07-09 RX ORDER — FLUTICASONE PROPIONATE 50 MCG
1 SPRAY, SUSPENSION (ML) NASAL DAILY
COMMUNITY

## 2024-07-13 ENCOUNTER — HOSPITAL ENCOUNTER (EMERGENCY)
Facility: HOSPITAL | Age: 54
Discharge: HOME | End: 2024-07-13
Attending: SPECIALIST
Payer: MEDICAID

## 2024-07-13 VITALS
SYSTOLIC BLOOD PRESSURE: 129 MMHG | WEIGHT: 214 LBS | HEART RATE: 94 BPM | RESPIRATION RATE: 16 BRPM | BODY MASS INDEX: 37.92 KG/M2 | OXYGEN SATURATION: 98 % | TEMPERATURE: 98 F | DIASTOLIC BLOOD PRESSURE: 90 MMHG | HEIGHT: 63 IN

## 2024-07-13 DIAGNOSIS — G44.209 TENSION HEADACHE: ICD-10-CM

## 2024-07-13 DIAGNOSIS — R42 DIZZINESS: Primary | ICD-10-CM

## 2024-07-13 LAB
ALBUMIN SERPL-MCNC: 3.6 G/DL (ref 3.5–5)
ALBUMIN/GLOB SERPL: 0.9 RATIO (ref 1.1–2)
ALP SERPL-CCNC: 85 UNIT/L (ref 40–150)
ALT SERPL-CCNC: 11 UNIT/L (ref 0–55)
ANION GAP SERPL CALC-SCNC: 11 MEQ/L
AST SERPL-CCNC: 15 UNIT/L (ref 5–34)
BASOPHILS # BLD AUTO: 0.06 X10(3)/MCL
BASOPHILS NFR BLD AUTO: 0.9 %
BILIRUB SERPL-MCNC: 0.3 MG/DL
BUN SERPL-MCNC: 17.3 MG/DL (ref 9.8–20.1)
CALCIUM SERPL-MCNC: 9.2 MG/DL (ref 8.4–10.2)
CHLORIDE SERPL-SCNC: 107 MMOL/L (ref 98–107)
CO2 SERPL-SCNC: 25 MMOL/L (ref 22–29)
CREAT SERPL-MCNC: 0.73 MG/DL (ref 0.55–1.02)
CREAT/UREA NIT SERPL: 24
EOSINOPHIL # BLD AUTO: 0.23 X10(3)/MCL (ref 0–0.9)
EOSINOPHIL NFR BLD AUTO: 3.3 %
ERYTHROCYTE [DISTWIDTH] IN BLOOD BY AUTOMATED COUNT: 14.2 % (ref 11.5–17)
GFR SERPLBLD CREATININE-BSD FMLA CKD-EPI: >60 ML/MIN/1.73/M2
GLOBULIN SER-MCNC: 3.8 GM/DL (ref 2.4–3.5)
GLUCOSE SERPL-MCNC: 104 MG/DL (ref 74–100)
HCT VFR BLD AUTO: 35.5 % (ref 37–47)
HGB BLD-MCNC: 11 G/DL (ref 12–16)
IMM GRANULOCYTES # BLD AUTO: 0 X10(3)/MCL (ref 0–0.04)
IMM GRANULOCYTES NFR BLD AUTO: 0 %
LYMPHOCYTES # BLD AUTO: 3.32 X10(3)/MCL (ref 0.6–4.6)
LYMPHOCYTES NFR BLD AUTO: 47.4 %
MCH RBC QN AUTO: 26.3 PG (ref 27–31)
MCHC RBC AUTO-ENTMCNC: 31 G/DL (ref 33–36)
MCV RBC AUTO: 84.9 FL (ref 80–94)
MONOCYTES # BLD AUTO: 0.62 X10(3)/MCL (ref 0.1–1.3)
MONOCYTES NFR BLD AUTO: 8.8 %
NEUTROPHILS # BLD AUTO: 2.78 X10(3)/MCL (ref 2.1–9.2)
NEUTROPHILS NFR BLD AUTO: 39.6 %
PLATELET # BLD AUTO: 351 X10(3)/MCL (ref 130–400)
PMV BLD AUTO: 10.5 FL (ref 7.4–10.4)
POTASSIUM SERPL-SCNC: 3.8 MMOL/L (ref 3.5–5.1)
PROT SERPL-MCNC: 7.4 GM/DL (ref 6.4–8.3)
RBC # BLD AUTO: 4.18 X10(6)/MCL (ref 4.2–5.4)
SODIUM SERPL-SCNC: 143 MMOL/L (ref 136–145)
WBC # BLD AUTO: 7.01 X10(3)/MCL (ref 4.5–11.5)

## 2024-07-13 PROCEDURE — 84443 ASSAY THYROID STIM HORMONE: CPT | Performed by: SPECIALIST

## 2024-07-13 PROCEDURE — 80053 COMPREHEN METABOLIC PANEL: CPT | Performed by: SPECIALIST

## 2024-07-13 PROCEDURE — 85025 COMPLETE CBC W/AUTO DIFF WBC: CPT | Performed by: SPECIALIST

## 2024-07-13 PROCEDURE — 25000003 PHARM REV CODE 250: Performed by: SPECIALIST

## 2024-07-13 PROCEDURE — 99283 EMERGENCY DEPT VISIT LOW MDM: CPT

## 2024-07-13 RX ORDER — MECLIZINE HYDROCHLORIDE 25 MG/1
25 TABLET ORAL
Status: COMPLETED | OUTPATIENT
Start: 2024-07-13 | End: 2024-07-13

## 2024-07-13 RX ORDER — BUTALBITAL, ACETAMINOPHEN AND CAFFEINE 50; 325; 40 MG/1; MG/1; MG/1
1 TABLET ORAL EVERY 4 HOURS PRN
Qty: 12 TABLET | Refills: 0 | Status: SHIPPED | OUTPATIENT
Start: 2024-07-13 | End: 2024-08-12

## 2024-07-13 RX ORDER — BUTALBITAL, ACETAMINOPHEN AND CAFFEINE 50; 325; 40 MG/1; MG/1; MG/1
1 TABLET ORAL
Status: COMPLETED | OUTPATIENT
Start: 2024-07-13 | End: 2024-07-13

## 2024-07-13 RX ADMIN — BUTALBITAL, ACETAMINOPHEN, AND CAFFEINE 1 TABLET: 325; 50; 40 TABLET ORAL at 11:07

## 2024-07-13 RX ADMIN — MECLIZINE HYDROCHLORIDE 25 MG: 25 TABLET ORAL at 10:07

## 2024-07-14 LAB — TSH SERPL-ACNC: 0.81 UIU/ML (ref 0.35–4.94)

## 2024-07-14 NOTE — ED PROVIDER NOTES
Encounter Date: 2024       History     Chief Complaint   Patient presents with    Dizziness     Pt reports to ED c/o dizziness that increases w/ standing. + N/V/HA. blurred vision. Pt states there has been multiple hospital visits for same symptoms. Hx. Vertigo. Pt also states she fell appx 1 hr ago, but denies pain or hitting head.     53-year-old female with reports of chronic vertigo and presents with dizziness that began shortly prior to arrival; patient reports seeing ENT in the past but has not had improvement in her symptoms over the last several months; she reports nausea, vomiting and headache with blurred vision which are similar symptoms she has had in the past    The history is provided by the patient.     Review of patient's allergies indicates:   Allergen Reactions    Aspirin      Other reaction(s): Bleeding  unable to take due to Gastric Bypass    Penicillin Rash     Past Medical History:   Diagnosis Date    Arthritis     Asthma     Esophageal spasm     GERD (gastroesophageal reflux disease)     Hidradenitis axillaris     bilateral, had surgery on right    Insulin resistance      Past Surgical History:   Procedure Laterality Date    AXILLARY HIDRADENITIS EXCISION  in 10/2018    Dr. Payan in [facility], right side, wound dehisced    BILATERAL TUBAL LIGATION       SECTION      CHOLECYSTECTOMY      COLONOSCOPY  2021    GASTRIC BYPASS      LAPAROSCOPIC GASTRIC BANDING      [place], [place]    TONSILLECTOMY       Family History   Problem Relation Name Age of Onset    Hypertension Mother      Diabetes Mother      Mitral valve prolapse Mother      Hypertension Father      Diabetes Father      Kidney cancer Father       Social History     Tobacco Use    Smoking status: Never    Smokeless tobacco: Never   Substance Use Topics    Alcohol use: Yes     Comment: occasionally    Drug use: Never     Review of Systems   Constitutional: Negative.    HENT: Negative.     Respiratory: Negative.      Cardiovascular: Negative.    Gastrointestinal: Negative.    Musculoskeletal: Negative.    Skin: Negative.    Neurological:  Positive for dizziness.   All other systems reviewed and are negative.      Physical Exam     Initial Vitals [07/13/24 2143]   BP Pulse Resp Temp SpO2   133/87 79 16 98.1 °F (36.7 °C) 98 %      MAP       --         Physical Exam    Nursing note and vitals reviewed.  Constitutional: She appears well-developed and well-nourished.   HENT:   Head: Normocephalic and atraumatic.   Eyes: EOM are normal. Pupils are equal, round, and reactive to light.   Neck: Neck supple.   Normal range of motion.  Cardiovascular:  Normal rate, regular rhythm, normal heart sounds and intact distal pulses.           Pulmonary/Chest: Breath sounds normal.   Abdominal: Abdomen is soft.   Musculoskeletal:         General: Normal range of motion.      Cervical back: Normal range of motion and neck supple.     Neurological: She is alert and oriented to person, place, and time. She has normal strength. No cranial nerve deficit. GCS score is 15. GCS eye subscore is 4. GCS verbal subscore is 5. GCS motor subscore is 6.   Skin: Skin is warm and dry.         ED Course   Procedures  Labs Reviewed   COMPREHENSIVE METABOLIC PANEL - Abnormal; Notable for the following components:       Result Value    Glucose 104 (*)     Globulin 3.8 (*)     Albumin/Globulin Ratio 0.9 (*)     All other components within normal limits   CBC WITH DIFFERENTIAL - Abnormal; Notable for the following components:    RBC 4.18 (*)     Hgb 11.0 (*)     Hct 35.5 (*)     MCH 26.3 (*)     MCHC 31.0 (*)     MPV 10.5 (*)     All other components within normal limits   TSH - Normal   CBC W/ AUTO DIFFERENTIAL    Narrative:     The following orders were created for panel order CBC auto differential.  Procedure                               Abnormality         Status                     ---------                               -----------         ------                 "     CBC with Differential[[id number]]       Abnormal            Final result                 Please view results for these tests on the individual orders.          Imaging Results    None          Medications   meclizine tablet 25 mg (25 mg Oral Given 7/13/24 2225)   butalbital-acetaminophen-caffeine -40 mg per tablet 1 tablet (1 tablet Oral Given 7/13/24 5791)     Medical Decision Making  53-year-old female with reports of chronic vertigo and presents with dizziness that began shortly prior to arrival; patient reports seeing ENT in the past but has not had improvement in her symptoms over the last several months; she reports nausea, vomiting and headache with blurred vision which are similar symptoms she has had in the past    DIFFERENTIAL DIAGNOSIS- chronic vertigo, electrolyte abnormality, anemia    Amount and/or Complexity of Data Reviewed  Labs: ordered. Decision-making details documented in ED Course.    Risk  Prescription drug management.  Risk Details: Patient given Antivert 25 mg p.o.; workup unremarkable; patient has Antivert at home and will follow up with the primary care physician within 1 week       Patient Vitals for the past 24 hrs:   BP Temp Temp src Pulse Resp SpO2 Height Weight   07/13/24 2147 (!) 129/90 -- -- 94 -- -- -- --   07/13/24 2143 133/87 98.1 °F (36.7 °C) Oral 79 16 98 % 5' 3" (1.6 m) 97.1 kg (214 lb)                 The patient is resting comfortably and in no acute distress.  She states that her symptoms have improved after treatment in Emergency Department. I personally discussed her test results and treatment plan.  Gave strict ED precautions.  Specific conditions for return to the emergency department and importance of follow up with her primary care provided or the physician listed on the discharge instructions.  Patient voices understanding and agrees to the plan discussed. All patients' questions have been answered at this time.   She has remained hemodynamically stable " throughout entire stay in ED and is stable for discharge home.                   Clinical Impression:  Final diagnoses:  [R42] Dizziness (Primary)  [G44.209] Tension headache          ED Disposition Condition    Discharge Stable          ED Prescriptions       Medication Sig Dispense Start Date End Date Auth. Provider    butalbital-acetaminophen-caffeine -40 mg (FIORICET, ESGIC) -40 mg per tablet Take 1 tablet by mouth every 4 (four) hours as needed. 12 tablet 7/13/2024 8/12/2024 Avel Chino MD          Follow-up Information       Follow up With Specialties Details Why Contact Info    Chepe Crews MD Family Medicine  Scheduled appointment [address] [facility].  [facility] [address]  [phone]               Avel Chino MD  07/14/24 2070

## 2024-10-02 ENCOUNTER — HOSPITAL ENCOUNTER (EMERGENCY)
Facility: HOSPITAL | Age: 54
Discharge: HOME | End: 2024-10-03
Attending: STUDENT IN AN ORGANIZED HEALTH CARE EDUCATION/TRAINING PROGRAM
Payer: MEDICAID

## 2024-10-02 VITALS
OXYGEN SATURATION: 97 % | HEIGHT: 63 IN | TEMPERATURE: 99 F | BODY MASS INDEX: 38.62 KG/M2 | SYSTOLIC BLOOD PRESSURE: 120 MMHG | WEIGHT: 218 LBS | HEART RATE: 86 BPM | DIASTOLIC BLOOD PRESSURE: 84 MMHG | RESPIRATION RATE: 20 BRPM

## 2024-10-02 DIAGNOSIS — M62.838 MUSCLE SPASM OF BOTH LOWER LEGS: Primary | ICD-10-CM

## 2024-10-02 PROCEDURE — 99283 EMERGENCY DEPT VISIT LOW MDM: CPT

## 2024-10-03 ENCOUNTER — HOSPITAL ENCOUNTER (OUTPATIENT)
Dept: RADIOLOGY | Facility: HOSPITAL | Age: 54
Discharge: HOME | End: 2024-10-03
Attending: OBSTETRICS & GYNECOLOGY
Payer: MEDICAID

## 2024-10-03 DIAGNOSIS — Z12.31 ENCOUNTER FOR SCREENING MAMMOGRAM FOR MALIGNANT NEOPLASM OF BREAST: ICD-10-CM

## 2024-10-03 LAB
ABS NEUT CALC (OHS): 3.44 X10(3)/MCL (ref 2.1–9.2)
ANION GAP SERPL CALC-SCNC: 9 MEQ/L
BUN SERPL-MCNC: 14.7 MG/DL (ref 9.8–20.1)
CALCIUM SERPL-MCNC: 9.1 MG/DL (ref 8.4–10.2)
CHLORIDE SERPL-SCNC: 106 MMOL/L (ref 98–107)
CO2 SERPL-SCNC: 28 MMOL/L (ref 22–29)
CREAT SERPL-MCNC: 0.75 MG/DL (ref 0.55–1.02)
CREAT/UREA NIT SERPL: 20
EOSINOPHIL NFR BLD MANUAL: 0.25 X10(3)/MCL (ref 0–0.9)
EOSINOPHIL NFR BLD MANUAL: 3 % (ref 0–8)
ERYTHROCYTE [DISTWIDTH] IN BLOOD BY AUTOMATED COUNT: 14.6 % (ref 11.5–17)
GFR SERPLBLD CREATININE-BSD FMLA CKD-EPI: >60 ML/MIN/1.73/M2
GLUCOSE SERPL-MCNC: 84 MG/DL (ref 74–100)
HCT VFR BLD AUTO: 37 % (ref 37–47)
HGB BLD-MCNC: 11.2 G/DL (ref 12–16)
LYMPHOCYTES NFR BLD MANUAL: 3.95 X10(3)/MCL
LYMPHOCYTES NFR BLD MANUAL: 47 % (ref 13–40)
MCH RBC QN AUTO: 26 PG (ref 27–31)
MCHC RBC AUTO-ENTMCNC: 30.3 G/DL (ref 33–36)
MCV RBC AUTO: 85.8 FL (ref 80–94)
MONOCYTES NFR BLD MANUAL: 0.76 X10(3)/MCL (ref 0.1–1.3)
MONOCYTES NFR BLD MANUAL: 9 % (ref 2–11)
NEUTROPHILS NFR BLD MANUAL: 41 % (ref 47–80)
PLATELET # BLD AUTO: 379 X10(3)/MCL (ref 130–400)
PLATELET # BLD EST: NORMAL 10*3/UL
PMV BLD AUTO: 10.2 FL (ref 7.4–10.4)
POTASSIUM SERPL-SCNC: 3.7 MMOL/L (ref 3.5–5.1)
RBC # BLD AUTO: 4.31 X10(6)/MCL (ref 4.2–5.4)
RBC MORPH BLD: NORMAL
SODIUM SERPL-SCNC: 143 MMOL/L (ref 136–145)
WBC # BLD AUTO: 8.4 X10(3)/MCL (ref 4.5–11.5)

## 2024-10-03 PROCEDURE — 25000003 PHARM REV CODE 250: Performed by: STUDENT IN AN ORGANIZED HEALTH CARE EDUCATION/TRAINING PROGRAM

## 2024-10-03 PROCEDURE — 77067 SCR MAMMO BI INCL CAD: CPT | Mod: TC

## 2024-10-03 PROCEDURE — 85027 COMPLETE CBC AUTOMATED: CPT | Performed by: STUDENT IN AN ORGANIZED HEALTH CARE EDUCATION/TRAINING PROGRAM

## 2024-10-03 PROCEDURE — 80048 BASIC METABOLIC PNL TOTAL CA: CPT | Performed by: STUDENT IN AN ORGANIZED HEALTH CARE EDUCATION/TRAINING PROGRAM

## 2024-10-03 RX ORDER — METHOCARBAMOL 500 MG/1
1000 TABLET, FILM COATED ORAL
Status: COMPLETED | OUTPATIENT
Start: 2024-10-03 | End: 2024-10-03

## 2024-10-03 RX ORDER — METHOCARBAMOL 500 MG/1
1000 TABLET, FILM COATED ORAL 3 TIMES DAILY
Qty: 30 TABLET | Refills: 0 | Status: SHIPPED | OUTPATIENT
Start: 2024-10-03 | End: 2024-10-08

## 2024-10-03 RX ADMIN — METHOCARBAMOL 1000 MG: 500 TABLET ORAL at 12:10

## 2024-10-03 NOTE — ED NOTES
Patient c/o bilateral calf pain, states it feels like they're cramping. Reports she has had issues in the past with this. Denies hx of DVT or any medical problems. Ambulated with steady gait but endorsed pain. No redness or swelling noted to calves.

## 2024-10-03 NOTE — ED PROVIDER NOTES
Encounter Date: 10/2/2024       History     Chief Complaint   Patient presents with    Leg Pain     Pt into ED with complaints of bilat leg pain x 3 days ago.      HPI  Patient was a 54-year-old female past medical history of arthritis, asthma, GERD, who presents to the ER complaining of bilateral leg cramping.  Patient states last time she felt like this she was told her potassium was low.  Secondary to the bilateral leg cramping patient presents to ER for further care and evaluation.  She denies any nausea, vomiting, fever, chills, chest pain, shortness of breath.  Review of patient's allergies indicates:   Allergen Reactions    Aspirin      Other reaction(s): Bleeding  unable to take due to Gastric Bypass    Penicillin Rash     Past Medical History:   Diagnosis Date    Arthritis     Asthma     Esophageal spasm     GERD (gastroesophageal reflux disease)     Hidradenitis axillaris     bilateral, had surgery on right    Insulin resistance      Past Surgical History:   Procedure Laterality Date    AXILLARY HIDRADENITIS EXCISION  in 10/2018    Dr. Payan in [facility], right side, wound dehisced    BILATERAL TUBAL LIGATION       SECTION      CHOLECYSTECTOMY      COLONOSCOPY  2021    GASTRIC BYPASS      LAPAROSCOPIC GASTRIC BANDING      [place], [place]    TONSILLECTOMY       Family History   Problem Relation Name Age of Onset    Hypertension Mother      Diabetes Mother      Mitral valve prolapse Mother      Hypertension Father      Diabetes Father      Kidney cancer Father       Social History     Tobacco Use    Smoking status: Never    Smokeless tobacco: Never   Substance Use Topics    Alcohol use: Yes     Comment: occasionally    Drug use: Never     Review of Systems   Constitutional:  Negative for fever.   HENT:  Negative for sore throat.    Eyes:  Negative for visual disturbance.   Respiratory:  Negative for shortness of breath.    Cardiovascular:  Negative for chest pain.   Gastrointestinal:   Negative for nausea.   Endocrine: Negative for polyuria.   Genitourinary:  Negative for dysuria.   Musculoskeletal:  Positive for myalgias. Negative for back pain.        Leg cramping   Skin:  Negative for rash.   Neurological:  Negative for weakness.   Hematological:  Does not bruise/bleed easily.   All other systems reviewed and are negative.      Physical Exam     Initial Vitals [10/02/24 2319]   BP Pulse Resp Temp SpO2   120/84 86 20 98.6 °F (37 °C) 97 %      MAP       --         Physical Exam    Nursing note and vitals reviewed.  Constitutional: Vital signs are normal. She appears well-developed and well-nourished. She is not diaphoretic. She is active.  Non-toxic appearance. She does not appear ill. No distress.   HENT:   Head: Normocephalic and atraumatic.   Eyes: Conjunctivae are normal. Pupils are equal, round, and reactive to light. Right conjunctiva is not injected. Left conjunctiva is not injected.   Neck: Trachea normal. Neck supple.   Normal range of motion.   Full passive range of motion without pain.     Cardiovascular:  Normal rate, regular rhythm, S1 normal, S2 normal, intact distal pulses and normal pulses.           Pulmonary/Chest: Breath sounds normal. No respiratory distress. She has no wheezes.   Abdominal: Abdomen is soft. Bowel sounds are normal. There is no abdominal tenderness.   Musculoskeletal:         General: No tenderness or edema. Normal range of motion.      Cervical back: Full passive range of motion without pain, normal range of motion and neck supple. No rigidity.      Right lower leg: No swelling. No edema.      Left lower leg: No swelling. No edema.     Neurological: She is alert and oriented to person, place, and time.   Skin: Skin is warm and dry. Capillary refill takes less than 2 seconds.         ED Course   Procedures  Labs Reviewed   CBC WITH DIFFERENTIAL - Abnormal       Result Value    WBC 8.40      RBC 4.31      Hgb 11.2 (*)     Hct 37.0      MCV 85.8      MCH 26.0  (*)     MCHC 30.3 (*)     RDW 14.6      Platelet 379      MPV 10.2     BASIC METABOLIC PANEL    Sodium 143      Potassium 3.7      Chloride 106      CO2 28      Glucose 84      Blood Urea Nitrogen 14.7      Creatinine 0.75      BUN/Creatinine Ratio 20      Calcium 9.1      Anion Gap 9.0      eGFR >60     CBC W/ AUTO DIFFERENTIAL    Narrative:     The following orders were created for panel order CBC auto differential.  Procedure                               Abnormality         Status                     ---------                               -----------         ------                     CBC with Differential[[id number]]       Abnormal            Final result               Manual Differential[[id number]]                             In process                   Please view results for these tests on the individual orders.   MANUAL DIFFERENTIAL          Imaging Results    None          Medications   methocarbamoL tablet 1,000 mg (1,000 mg Oral Given 10/3/24 0023)     Medical Decision Making  Patient was a 54-year-old female past medical history of arthritis, asthma, GERD, who presents to the ER complaining of bilateral leg cramping.      Differential diagnosis includes but not limited to:  Electrolyte derangements, viral syndrome, PVD, DVT, ligamentous strain muscle spasm     Patient vitals on arrival stable.  On physical exam there are no leg discrepancy as far as size.  Negative Homans sign bilaterally.  No overlying skin changes.  Patient has no reproducible pain on palpation.  Screening laboratories obtained rule out electrolyte abnormalities which returned grossly normal.  Patient was given Robaxin here in the ED.  Will discharge with the same.  Patient encouraged to follow with her PCP in next few days.  Strict return precautions given symptoms worsen or change.  Patient amenable plan as noted.  Patient stable for discharge.      Trino Estrada M.D.  Emergency Medicine        Amount and/or Complexity of Data  Reviewed  Labs: ordered.    Risk  Prescription drug management.                                      Clinical Impression:  Final diagnoses:  [M62.838] Muscle spasm of both lower legs (Primary)          ED Disposition Condition    Discharge Stable          ED Prescriptions       Medication Sig Dispense Start Date End Date Auth. Provider    methocarbamoL (ROBAXIN) 500 MG Tab Take 2 tablets (1,000 mg total) by mouth 3 (three) times daily. for 5 days 30 tablet 10/3/2024 10/8/2024 Trino Estrada MD          Follow-up Information       Follow up With Specialties Details Why Contact Info    Chepe Crews MD Family Medicine Schedule an appointment as soon as possible for a visit in 2 days For follow up [address] [facility].  [facility] [address]  [phone]      [facility] - Emergency Dept Emergency Medicine  If symptoms worsen [address] [facility] [id number] [phone]             Trino Estrada MD  10/03/24 0054

## 2024-11-13 ENCOUNTER — LAB VISIT (OUTPATIENT)
Dept: LAB | Facility: HOSPITAL | Age: 54
End: 2024-11-13
Attending: INTERNAL MEDICINE
Payer: MEDICAID

## 2024-11-13 DIAGNOSIS — Z11.1 SCREENING EXAMINATION FOR PULMONARY TUBERCULOSIS: Primary | ICD-10-CM

## 2024-11-13 PROCEDURE — 36415 COLL VENOUS BLD VENIPUNCTURE: CPT

## 2024-11-13 PROCEDURE — 86480 TB TEST CELL IMMUN MEASURE: CPT

## 2024-11-15 LAB
GAMMA INTERFERON BACKGROUND BLD IA-ACNC: 0.05 IU/ML
M TB IFN-G BLD-IMP: NEGATIVE
M TB IFN-G CD4+ BCKGRND COR BLD-ACNC: 0 IU/ML
M TB IFN-G CD4+CD8+ BCKGRND COR BLD-ACNC: -0.01 IU/ML
MITOGEN IGNF BCKGRD COR BLD-ACNC: 9.95 IU/ML

## 2024-11-18 ENCOUNTER — LAB VISIT (OUTPATIENT)
Dept: LAB | Facility: HOSPITAL | Age: 54
End: 2024-11-18
Attending: INTERNAL MEDICINE
Payer: MEDICAID

## 2024-11-18 DIAGNOSIS — Z11.59 SCREENING EXAMINATION FOR POLIOMYELITIS: Primary | ICD-10-CM

## 2024-11-18 PROCEDURE — 87340 HEPATITIS B SURFACE AG IA: CPT

## 2024-11-18 PROCEDURE — 36415 COLL VENOUS BLD VENIPUNCTURE: CPT

## 2024-11-18 PROCEDURE — 86704 HEP B CORE ANTIBODY TOTAL: CPT

## 2024-11-18 PROCEDURE — 86706 HEP B SURFACE ANTIBODY: CPT

## 2024-11-19 LAB
HBV CORE AB SERPL QL IA: NONREACTIVE
HBV SURFACE AB SER-ACNC: 0.13 MIU/ML
HBV SURFACE AB SERPL IA-ACNC: NONREACTIVE M[IU]/ML
HBV SURFACE AG SERPL QL IA: NONREACTIVE

## 2024-11-25 ENCOUNTER — HOSPITAL ENCOUNTER (EMERGENCY)
Facility: HOSPITAL | Age: 54
Discharge: HOME | End: 2024-11-25
Attending: STUDENT IN AN ORGANIZED HEALTH CARE EDUCATION/TRAINING PROGRAM
Payer: MEDICAID

## 2024-11-25 VITALS
BODY MASS INDEX: 44.29 KG/M2 | HEART RATE: 98 BPM | OXYGEN SATURATION: 99 % | RESPIRATION RATE: 20 BRPM | DIASTOLIC BLOOD PRESSURE: 78 MMHG | WEIGHT: 250 LBS | SYSTOLIC BLOOD PRESSURE: 120 MMHG | TEMPERATURE: 98 F

## 2024-11-25 DIAGNOSIS — M79.606 LEG PAIN: ICD-10-CM

## 2024-11-25 DIAGNOSIS — M79.662 PAIN OF LEFT CALF: Primary | ICD-10-CM

## 2024-11-25 PROCEDURE — 99284 EMERGENCY DEPT VISIT MOD MDM: CPT | Mod: 25

## 2024-11-25 RX ORDER — CYCLOBENZAPRINE HCL 10 MG
10 TABLET ORAL 3 TIMES DAILY PRN
Qty: 15 TABLET | Refills: 0 | Status: SHIPPED | OUTPATIENT
Start: 2024-11-25 | End: 2024-11-30

## 2024-11-25 NOTE — ED PROVIDER NOTES
Encounter Date: 2024      History     Chief Complaint   Patient presents with    Leg Pain     Lt calf   pain x 2 hours now- cramping pain she states - has had it intermittently in the last week     Lilian Arroyo is a 54 y.o. female who presented to [facility] ED on 2024 with a primary complaint of left leg pain that happened approximately 2 weeks ago that feels cramping and sharp in nature went away in his came back recently today pain is significant 10/10 when it occurs according to the patient.  Patient does endorse sedentary lifestyle however denies recent travel.  She denies any chest pain or shortness for breath.    Past Medical History:  She  has a past medical history of Arthritis, Asthma, Esophageal spasm, GERD (gastroesophageal reflux disease), Hidradenitis axillaris, and Insulin resistance.    Past Surgical History:  She  has a past surgical history that includes Cholecystectomy; Tonsillectomy; Laparoscopic gastric banding (); Axillary hidradenitis excision (in 10/2018); Colonoscopy (2021); Bilateral tubal ligation;  section; and Gastric bypass.    Allergies:  Review of patient's allergies indicates:   Allergen Reactions    Aspirin      Other reaction(s): Bleeding  unable to take due to Gastric Bypass    Penicillin Rash       Family History:  Her family history includes Diabetes in her father and mother; Hypertension in her father and mother; Kidney cancer in her father; Mitral valve prolapse in her mother.    Social History:  She  reports that she has never smoked. She has never used smokeless tobacco. She reports current alcohol use. She reports that she does not use drugs.    Home Medications:  She has a current medication list which includes the following prescription(s): humira, albuterol, allergy relief (cetirizine), ammonium lactate, ammonium lactate, blood-glucose meter, blood-glucose meter,continuous, cyanocobalamin, cyclobenzaprine, docusate sodium, ergocalciferol,  esomeprazole, fluticasone propionate, humira(cf) pen, hydroxyzine pamoate, trueplus lancets, meclizine, multivitamin with minerals, mupirocin, mupirocin, sucralfate, topiramate, true metrix glucose test strip, true metrix glucose test strip, venlafaxine, and zinc gluconate.     ROS  Pertinent positives and negatives listed in HPI. All other systems are reviewed and are negative.    Physical Exam     Initial Vitals [11/25/24 1412]   BP Pulse Resp Temp SpO2   120/78 98 20 97.7 °F (36.5 °C) 99 %      MAP       --         General: Awake, alert, & oriented to person, place & time. No acute distress  Psychiatric: Mood and affect normal  HEENT: Normocephalic, atraumatic. Face symmetric.  Cardiovascular: Regular rate & rhythm  Pulmonary: Bilateral symmetric chest rise, Non-labored  Abdominal:  nondistended  Extremities: No clubbing or cyanosis.  No edema present.  LLE without erythema or increased size.  There is a positive Homans sign  Skin:  Exposed skin is warm & dry.  Neuro:   Patient moves all extremities equally. Sensation intact bilateraly.    ED Course   Procedures  Labs Reviewed - No data to display       Imaging Results              US Lower Extremity Veins Bilateral (In process)                      Medications - No data to display  Medical Decision Making    Lilian Arroyo is a 54 y.o. female who presented to [facility] ED on 11/25/2024 with a primary complaint of left leg pain that happened approximately 2 weeks ago that feels cramping and sharp in nature went away in his came back recently today pain is significant 10/10 when it occurs according to the patient.  Patient does endorse sedentary lifestyle however denies recent travel.  She denies any chest pain or shortness for breath.    Bilateral DVT ultrasound shows no abnormalities.  No DVTs noted.  Patient discharged on Flexeril as needed for pain.    Amount and/or Complexity of Data Reviewed  External Data Reviewed:  Notes, labs  Labs:  All labs that have been  ordered have been reviewed and are documented in ED Course.  Radiology: All images that have been ordered have been reviewed and are documented in ED Course.         Ddx:  Cellulitis, DVT, venous stasis dermatitis, PVD, among others      The patient is resting comfortably and is in no acute distress.  Patient states that her symptoms have improved after treatment within ER. Discussed test results, shared treatment plan, specific conditions for return, and importance of follow up with patient and family. Advised to complete her treatment with Flexeril as needed for spasm as well. The patient understands and agrees with the plan as discussed. Answered all patient questions at this time.She has remained hemodynamically stable throughout the ED course and is appropriate for discharge home.     ED Course as of 11/25/24 1554   Mon Nov 25, 2024   1522  Lower Extremity Veins Bilateral [JH]      ED Course User Index  [JH] Trino Dorsey MD                        Clinical Impression:  Final diagnoses:  [M79.606] Leg pain  [M79.662] Pain of left calf (Primary)                   Trino Dorsey MD  11/25/24 3772

## 2024-11-29 ENCOUNTER — LAB VISIT (OUTPATIENT)
Dept: LAB | Facility: HOSPITAL | Age: 54
End: 2024-11-29
Attending: NURSE PRACTITIONER
Payer: MEDICAID

## 2024-11-29 DIAGNOSIS — I20.81 ANGINA PECTORIS WITH CORONARY MICROVASCULAR DYSFUNCTION: Primary | ICD-10-CM

## 2024-11-29 LAB
ANION GAP SERPL CALC-SCNC: 8 MEQ/L
BUN SERPL-MCNC: 14.5 MG/DL (ref 9.8–20.1)
CALCIUM SERPL-MCNC: 9.4 MG/DL (ref 8.4–10.2)
CHLORIDE SERPL-SCNC: 104 MMOL/L (ref 98–107)
CO2 SERPL-SCNC: 28 MMOL/L (ref 22–29)
CREAT SERPL-MCNC: 0.67 MG/DL (ref 0.55–1.02)
CREAT/UREA NIT SERPL: 22
GFR SERPLBLD CREATININE-BSD FMLA CKD-EPI: >60 ML/MIN/1.73/M2
GLUCOSE SERPL-MCNC: 101 MG/DL (ref 74–100)
POTASSIUM SERPL-SCNC: 4.3 MMOL/L (ref 3.5–5.1)
SODIUM SERPL-SCNC: 140 MMOL/L (ref 136–145)

## 2024-11-29 PROCEDURE — 36415 COLL VENOUS BLD VENIPUNCTURE: CPT

## 2024-11-29 PROCEDURE — 80048 BASIC METABOLIC PNL TOTAL CA: CPT

## 2024-12-05 ENCOUNTER — HOSPITAL ENCOUNTER (EMERGENCY)
Facility: HOSPITAL | Age: 54
Discharge: HOME | End: 2024-12-05
Attending: STUDENT IN AN ORGANIZED HEALTH CARE EDUCATION/TRAINING PROGRAM
Payer: MEDICAID

## 2024-12-05 VITALS
HEIGHT: 63 IN | TEMPERATURE: 99 F | HEART RATE: 97 BPM | DIASTOLIC BLOOD PRESSURE: 80 MMHG | OXYGEN SATURATION: 99 % | BODY MASS INDEX: 38.27 KG/M2 | SYSTOLIC BLOOD PRESSURE: 119 MMHG | WEIGHT: 216 LBS | RESPIRATION RATE: 20 BRPM

## 2024-12-05 DIAGNOSIS — R07.9 CHEST PAIN: ICD-10-CM

## 2024-12-05 LAB
ALBUMIN SERPL-MCNC: 3.7 G/DL (ref 3.5–5)
ALBUMIN/GLOB SERPL: 0.8 RATIO (ref 1.1–2)
ALP SERPL-CCNC: 119 UNIT/L (ref 40–150)
ALT SERPL-CCNC: 8 UNIT/L (ref 0–55)
ANION GAP SERPL CALC-SCNC: 10 MEQ/L
AST SERPL-CCNC: 14 UNIT/L (ref 5–34)
BASOPHILS # BLD AUTO: 0.05 X10(3)/MCL
BASOPHILS NFR BLD AUTO: 0.5 %
BILIRUB SERPL-MCNC: 0.3 MG/DL
BNP BLD-MCNC: <10 PG/ML
BUN SERPL-MCNC: 21.4 MG/DL (ref 9.8–20.1)
CALCIUM SERPL-MCNC: 9.6 MG/DL (ref 8.4–10.2)
CHLORIDE SERPL-SCNC: 105 MMOL/L (ref 98–107)
CO2 SERPL-SCNC: 27 MMOL/L (ref 22–29)
CREAT SERPL-MCNC: 0.71 MG/DL (ref 0.55–1.02)
CREAT/UREA NIT SERPL: 30
EOSINOPHIL # BLD AUTO: 0.18 X10(3)/MCL (ref 0–0.9)
EOSINOPHIL NFR BLD AUTO: 1.9 %
ERYTHROCYTE [DISTWIDTH] IN BLOOD BY AUTOMATED COUNT: 13.7 % (ref 11.5–17)
GFR SERPLBLD CREATININE-BSD FMLA CKD-EPI: >60 ML/MIN/1.73/M2
GLOBULIN SER-MCNC: 4.5 GM/DL (ref 2.4–3.5)
GLUCOSE SERPL-MCNC: 96 MG/DL (ref 74–100)
HCT VFR BLD AUTO: 39.1 % (ref 37–47)
HGB BLD-MCNC: 12 G/DL (ref 12–16)
IMM GRANULOCYTES # BLD AUTO: 0.01 X10(3)/MCL (ref 0–0.04)
IMM GRANULOCYTES NFR BLD AUTO: 0.1 %
LYMPHOCYTES # BLD AUTO: 3.65 X10(3)/MCL (ref 0.6–4.6)
LYMPHOCYTES NFR BLD AUTO: 39.2 %
MCH RBC QN AUTO: 26.1 PG (ref 27–31)
MCHC RBC AUTO-ENTMCNC: 30.7 G/DL (ref 33–36)
MCV RBC AUTO: 85 FL (ref 80–94)
MONOCYTES # BLD AUTO: 0.78 X10(3)/MCL (ref 0.1–1.3)
MONOCYTES NFR BLD AUTO: 8.4 %
NEUTROPHILS # BLD AUTO: 4.65 X10(3)/MCL (ref 2.1–9.2)
NEUTROPHILS NFR BLD AUTO: 49.9 %
PLATELET # BLD AUTO: 408 X10(3)/MCL (ref 130–400)
PMV BLD AUTO: 10.3 FL (ref 7.4–10.4)
POTASSIUM SERPL-SCNC: 4 MMOL/L (ref 3.5–5.1)
PROT SERPL-MCNC: 8.2 GM/DL (ref 6.4–8.3)
RBC # BLD AUTO: 4.6 X10(6)/MCL (ref 4.2–5.4)
SODIUM SERPL-SCNC: 142 MMOL/L (ref 136–145)
TROPONIN I SERPL-MCNC: <0.01 NG/ML (ref 0–0.04)
WBC # BLD AUTO: 9.32 X10(3)/MCL (ref 4.5–11.5)

## 2024-12-05 PROCEDURE — 83880 ASSAY OF NATRIURETIC PEPTIDE: CPT | Performed by: STUDENT IN AN ORGANIZED HEALTH CARE EDUCATION/TRAINING PROGRAM

## 2024-12-05 PROCEDURE — 93005 ELECTROCARDIOGRAM TRACING: CPT

## 2024-12-05 PROCEDURE — 84484 ASSAY OF TROPONIN QUANT: CPT | Performed by: STUDENT IN AN ORGANIZED HEALTH CARE EDUCATION/TRAINING PROGRAM

## 2024-12-05 PROCEDURE — 80053 COMPREHEN METABOLIC PANEL: CPT | Performed by: STUDENT IN AN ORGANIZED HEALTH CARE EDUCATION/TRAINING PROGRAM

## 2024-12-05 PROCEDURE — 85025 COMPLETE CBC W/AUTO DIFF WBC: CPT | Performed by: STUDENT IN AN ORGANIZED HEALTH CARE EDUCATION/TRAINING PROGRAM

## 2024-12-05 PROCEDURE — 93010 ELECTROCARDIOGRAM REPORT: CPT | Mod: ,,, | Performed by: STUDENT IN AN ORGANIZED HEALTH CARE EDUCATION/TRAINING PROGRAM

## 2024-12-05 PROCEDURE — 99284 EMERGENCY DEPT VISIT MOD MDM: CPT | Mod: 25

## 2024-12-05 NOTE — ED NOTES
Pt w hx b ariatric surgery and  hx GERD presents w  hx chest pain for  a few months now that she has been getting worked up for but otday had sob accompanied w it.- denies  nv or diaphoresis- ek g nsr w occasional pvc

## 2024-12-05 NOTE — ED PROVIDER NOTES
Encounter Date: 2024      History     Chief Complaint   Patient presents with    Chest Pain     Pt reports of mid sternal chest pain for a while but today she had SOB with it.      Lilian Arroyo is a 54 y.o. female who presented to [facility] ED on 2024 with a primary complaint of chest pain.  Patient states she has always had chest pain she has upcoming stress test however while she was having her chest pain today she became short of breath.  She denies any emesis or nausea as well as diaphoresis she does state that it radiates to her left chest.  She denies any other symptoms such as lower extremity edema.    Past Medical History:  She  has a past medical history of Arthritis, Asthma, Esophageal spasm, GERD (gastroesophageal reflux disease), Hidradenitis axillaris, and Insulin resistance.    Past Surgical History:  She  has a past surgical history that includes Cholecystectomy; Tonsillectomy; Laparoscopic gastric banding (); Axillary hidradenitis excision (in 10/2018); Colonoscopy (2021); Bilateral tubal ligation;  section; and Gastric bypass.    Allergies:  Review of patient's allergies indicates:   Allergen Reactions    Aspirin      Other reaction(s): Bleeding  unable to take due to Gastric Bypass    Penicillin Rash       Family History:  Her family history includes Diabetes in her father and mother; Hypertension in her father and mother; Kidney cancer in her father; Mitral valve prolapse in her mother.    Social History:  She  reports that she has never smoked. She has never used smokeless tobacco. She reports current alcohol use. She reports that she does not use drugs.    Home Medications:  She has a current medication list which includes the following prescription(s): humira, albuterol, allergy relief (cetirizine), ammonium lactate, ammonium lactate, blood-glucose meter, blood-glucose meter,continuous, cyanocobalamin, docusate sodium, ergocalciferol, esomeprazole, fluticasone  propionate, humira(cf) pen, hydroxyzine pamoate, trueplus lancets, meclizine, multivitamin with minerals, mupirocin, mupirocin, sucralfate, topiramate, true metrix glucose test strip, true metrix glucose test strip, venlafaxine, and zinc gluconate.     ROS  Pertinent positives and negatives listed in HPI. All other systems are reviewed and are negative.    Physical Exam     Initial Vitals [12/05/24 1645]   BP Pulse Resp Temp SpO2   119/80 105 20 98.9 °F (37.2 °C) 100 %      MAP       --         General: No acute distress  HEENT: Normocephalic, atraumatic. Face symmetric.   Cardiovascular:  Tachycardic with regular rhythm.  No murmurs, rubs or gallops.  Normal S1-S2.  Pulmonary: Bilateral symmetric chest rise, Non-labored.  No Wheezes, rhonchi or crackles are appreciated.  Abdominal:  nondistended, no tenderness to palpation  Extremities: No clubbing or cyanosis.  Skin:  Exposed skin is warm & dry.  Neuro:   Patient moves all extremities equally. Sensation intact bilateraly.    ED Course   Procedures  Labs Reviewed   COMPREHENSIVE METABOLIC PANEL - Abnormal       Result Value    Sodium 142      Potassium 4.0      Chloride 105      CO2 27      Glucose 96      Blood Urea Nitrogen 21.4 (*)     Creatinine 0.71      Calcium 9.6      Protein Total 8.2      Albumin 3.7      Globulin 4.5 (*)     Albumin/Globulin Ratio 0.8 (*)     Bilirubin Total 0.3            ALT 8      AST 14      eGFR >60      Anion Gap 10.0      BUN/Creatinine Ratio 30     CBC WITH DIFFERENTIAL - Abnormal    WBC 9.32      RBC 4.60      Hgb 12.0      Hct 39.1      MCV 85.0      MCH 26.1 (*)     MCHC 30.7 (*)     RDW 13.7      Platelet 408 (*)     MPV 10.3      Neut % 49.9      Lymph % 39.2      Mono % 8.4      Eos % 1.9      Basophil % 0.5      Lymph # 3.65      Neut # 4.65      Mono # 0.78      Eos # 0.18      Baso # 0.05      IG# 0.01      IG% 0.1     TROPONIN I - Normal    Troponin-I <0.010     B-TYPE NATRIURETIC PEPTIDE - Normal    Natriuretic  Peptide <10.0     CBC W/ AUTO DIFFERENTIAL    Narrative:     The following orders were created for panel order CBC auto differential.  Procedure                               Abnormality         Status                     ---------                               -----------         ------                     CBC with Differential[[id number]]       Abnormal            Final result                 Please view results for these tests on the individual orders.     EKG Readings: (Independently Interpreted)   Initial Reading: No STEMI. Rhythm: Normal Sinus Rhythm. Ectopy: PVCs. Axis: Normal.       Imaging Results    None          Medications - No data to display  Medical Decision Making    Lilian Arroyo is a 54 y.o. female who presented to [facility] ED on 12/5/2024 with a primary complaint of chest pain.  Patient states she has always had chest pain she has upcoming stress test however while she was having her chest pain today she became short of breath.  She denies any emesis or nausea as well as diaphoresis she does state that it radiates to her left chest.  She denies any other symptoms such as lower extremity edema.    Physical exam as above.  EKG was unremarkable with the exception of occasional PVC.  BNP, troponin, CBC and CMP all within normal values.  Patient was discharged home with strict follow-up with PCP as well as to keep her upcoming appointment for stress test.    Amount and/or Complexity of Data Reviewed  External Data Reviewed:  Notes, labs  Labs:  All labs that have been ordered have been reviewed and are documented in ED Course.  Radiology: All images that have been ordered have been reviewed and are documented in ED Course.         Ddx:  Miocardial infarction, pneumothorax, aortic dissection, pulmonary emboli, pneumonia, among others      The patient is resting comfortably and is in no acute distress.  Patient states that her symptoms have improved after treatment within ER. Discussed test results, shared  treatment plan, specific conditions for return, and importance of follow up with patient and family. Advised to complete her treatment with upcoming stress test as well. The patient understands and agrees with the plan as discussed. Answered all patient questions at this time.She has remained hemodynamically stable throughout the ED course and is appropriate for discharge home.                           Clinical Impression:  Final diagnoses:  [R07.9] Chest pain            ED Disposition Condition    Discharge Stable          ED Prescriptions    None       Follow-up Information       Follow up With Specialties Details Why Contact Info    Chepe Crews MD Family Medicine Schedule an appointment as soon as possible for a visit in 1 day Please call to make/infer about appointment [address] [facility].  [facility] [address]  [phone]      [facility] - Emergency Dept Emergency Medicine  If symptoms worsen [address] [facility] [id number] [phone]             Trino Dorsey MD  12/05/24 1173

## 2024-12-09 LAB
OHS QRS DURATION: 78 MS
OHS QTC CALCULATION: 444 MS

## 2025-01-09 DIAGNOSIS — Z12.31 OTHER SCREENING MAMMOGRAM: Primary | ICD-10-CM

## 2025-03-05 DIAGNOSIS — Z00.00 WELLNESS EXAMINATION: ICD-10-CM

## 2025-03-05 DIAGNOSIS — Z86.39 H/O DIABETES MELLITUS: Primary | ICD-10-CM

## 2025-04-08 ENCOUNTER — LAB VISIT (OUTPATIENT)
Dept: LAB | Facility: HOSPITAL | Age: 55
End: 2025-04-08
Attending: FAMILY MEDICINE
Payer: MEDICAID

## 2025-04-08 DIAGNOSIS — I10 ESSENTIAL HYPERTENSION, MALIGNANT: Primary | ICD-10-CM

## 2025-04-08 DIAGNOSIS — R51.9 FACIAL PAIN: ICD-10-CM

## 2025-04-08 DIAGNOSIS — E55.9 AVITAMINOSIS D: ICD-10-CM

## 2025-04-08 DIAGNOSIS — D64.9 ANEMIA, UNSPECIFIED TYPE: ICD-10-CM

## 2025-04-08 DIAGNOSIS — G47.00 PERSISTENT DISORDER OF INITIATING OR MAINTAINING SLEEP: ICD-10-CM

## 2025-04-08 DIAGNOSIS — F90.0 ADHD, PREDOMINANTLY INATTENTIVE TYPE: ICD-10-CM

## 2025-04-08 DIAGNOSIS — R42 DIZZINESS AND GIDDINESS: ICD-10-CM

## 2025-04-08 DIAGNOSIS — K21.9 GASTROESOPHAGEAL REFLUX DISEASE, UNSPECIFIED WHETHER ESOPHAGITIS PRESENT: ICD-10-CM

## 2025-04-08 LAB
ALBUMIN SERPL-MCNC: 3.7 G/DL (ref 3.5–5)
ALBUMIN/GLOB SERPL: 1 RATIO (ref 1.1–2)
ALP SERPL-CCNC: 96 UNIT/L (ref 40–150)
ALT SERPL-CCNC: 11 UNIT/L (ref 0–55)
ANION GAP SERPL CALC-SCNC: 7 MEQ/L
AST SERPL-CCNC: 15 UNIT/L (ref 11–45)
BACTERIA #/AREA URNS AUTO: ABNORMAL /HPF
BILIRUB SERPL-MCNC: 0.4 MG/DL
BILIRUB UR QL STRIP.AUTO: NEGATIVE
BUN SERPL-MCNC: 16.5 MG/DL (ref 9.8–20.1)
CALCIUM SERPL-MCNC: 8.9 MG/DL (ref 8.4–10.2)
CHLORIDE SERPL-SCNC: 106 MMOL/L (ref 98–107)
CHOLEST SERPL-MCNC: 199 MG/DL
CHOLEST/HDLC SERPL: 3 {RATIO} (ref 0–5)
CLARITY UR: ABNORMAL
CO2 SERPL-SCNC: 28 MMOL/L (ref 22–29)
COLOR UR AUTO: YELLOW
CREAT SERPL-MCNC: 0.69 MG/DL (ref 0.55–1.02)
CREAT/UREA NIT SERPL: 24
ERYTHROCYTE [DISTWIDTH] IN BLOOD BY AUTOMATED COUNT: 13.8 % (ref 11.5–17)
EST. AVERAGE GLUCOSE BLD GHB EST-MCNC: 111.2 MG/DL
GFR SERPLBLD CREATININE-BSD FMLA CKD-EPI: >60 ML/MIN/1.73/M2
GLOBULIN SER-MCNC: 3.8 GM/DL (ref 2.4–3.5)
GLUCOSE SERPL-MCNC: 106 MG/DL (ref 74–100)
GLUCOSE UR QL STRIP: NEGATIVE
HBA1C MFR BLD: 5.5 %
HCT VFR BLD AUTO: 38 % (ref 37–47)
HDLC SERPL-MCNC: 65 MG/DL (ref 35–60)
HGB BLD-MCNC: 11.5 G/DL (ref 12–16)
HGB UR QL STRIP: ABNORMAL
KETONES UR QL STRIP: NEGATIVE
LDLC SERPL CALC-MCNC: 117 MG/DL (ref 50–140)
LEUKOCYTE ESTERASE UR QL STRIP: NEGATIVE
MCH RBC QN AUTO: 25.2 PG (ref 27–31)
MCHC RBC AUTO-ENTMCNC: 30.3 G/DL (ref 33–36)
MCV RBC AUTO: 83.2 FL (ref 80–94)
NITRITE UR QL STRIP: NEGATIVE
PH UR STRIP: 5.5 [PH]
PLATELET # BLD AUTO: 408 X10(3)/MCL (ref 130–400)
PMV BLD AUTO: 10.3 FL (ref 7.4–10.4)
POTASSIUM SERPL-SCNC: 4.2 MMOL/L (ref 3.5–5.1)
PROT SERPL-MCNC: 7.5 GM/DL (ref 6.4–8.3)
PROT UR QL STRIP: NEGATIVE
RBC # BLD AUTO: 4.57 X10(6)/MCL (ref 4.2–5.4)
RBC #/AREA URNS AUTO: ABNORMAL /HPF
SODIUM SERPL-SCNC: 141 MMOL/L (ref 136–145)
SP GR UR STRIP.AUTO: >=1.03 (ref 1–1.03)
SQUAMOUS #/AREA URNS AUTO: ABNORMAL /HPF
TRIGL SERPL-MCNC: 85 MG/DL (ref 37–140)
TSH SERPL-ACNC: 1.95 UIU/ML (ref 0.35–4.94)
UROBILINOGEN UR STRIP-ACNC: 1
VLDLC SERPL CALC-MCNC: 17 MG/DL
WBC # BLD AUTO: 7.39 X10(3)/MCL (ref 4.5–11.5)
WBC #/AREA URNS AUTO: ABNORMAL /HPF

## 2025-04-08 PROCEDURE — 80061 LIPID PANEL: CPT

## 2025-04-08 PROCEDURE — 80053 COMPREHEN METABOLIC PANEL: CPT

## 2025-04-08 PROCEDURE — 85027 COMPLETE CBC AUTOMATED: CPT

## 2025-04-08 PROCEDURE — 84443 ASSAY THYROID STIM HORMONE: CPT

## 2025-04-08 PROCEDURE — 81003 URINALYSIS AUTO W/O SCOPE: CPT

## 2025-04-08 PROCEDURE — 36415 COLL VENOUS BLD VENIPUNCTURE: CPT

## 2025-04-08 PROCEDURE — 83036 HEMOGLOBIN GLYCOSYLATED A1C: CPT

## 2025-05-24 ENCOUNTER — HOSPITAL ENCOUNTER (EMERGENCY)
Facility: HOSPITAL | Age: 55
Discharge: HOME | End: 2025-05-24
Payer: MEDICAID

## 2025-05-24 VITALS
HEIGHT: 63 IN | HEART RATE: 69 BPM | OXYGEN SATURATION: 96 % | DIASTOLIC BLOOD PRESSURE: 94 MMHG | WEIGHT: 220 LBS | BODY MASS INDEX: 38.98 KG/M2 | SYSTOLIC BLOOD PRESSURE: 135 MMHG | RESPIRATION RATE: 12 BRPM | TEMPERATURE: 98 F

## 2025-05-24 DIAGNOSIS — R07.9 CHEST PAIN: Primary | ICD-10-CM

## 2025-05-24 LAB
ALBUMIN SERPL-MCNC: 3.3 G/DL (ref 3.5–5)
ALBUMIN/GLOB SERPL: 0.8 RATIO (ref 1.1–2)
ALP SERPL-CCNC: 117 UNIT/L (ref 40–150)
ALT SERPL-CCNC: 14 UNIT/L (ref 0–55)
ANION GAP SERPL CALC-SCNC: 8 MEQ/L
AST SERPL-CCNC: 15 UNIT/L (ref 11–45)
BASOPHILS # BLD AUTO: 0.05 X10(3)/MCL
BASOPHILS NFR BLD AUTO: 0.6 %
BILIRUB SERPL-MCNC: 0.3 MG/DL
BNP BLD-MCNC: 23.5 PG/ML
BUN SERPL-MCNC: 18.8 MG/DL (ref 9.8–20.1)
CALCIUM SERPL-MCNC: 8.9 MG/DL (ref 8.4–10.2)
CHLORIDE SERPL-SCNC: 106 MMOL/L (ref 98–107)
CO2 SERPL-SCNC: 27 MMOL/L (ref 22–29)
CREAT SERPL-MCNC: 0.64 MG/DL (ref 0.55–1.02)
CREAT/UREA NIT SERPL: 29
EOSINOPHIL # BLD AUTO: 0.53 X10(3)/MCL (ref 0–0.9)
EOSINOPHIL NFR BLD AUTO: 6.7 %
ERYTHROCYTE [DISTWIDTH] IN BLOOD BY AUTOMATED COUNT: 13.8 % (ref 11.5–17)
GFR SERPLBLD CREATININE-BSD FMLA CKD-EPI: >60 ML/MIN/1.73/M2
GLOBULIN SER-MCNC: 4.4 GM/DL (ref 2.4–3.5)
GLUCOSE SERPL-MCNC: 104 MG/DL (ref 74–100)
HCT VFR BLD AUTO: 32.4 % (ref 37–47)
HGB BLD-MCNC: 10.1 G/DL (ref 12–16)
IMM GRANULOCYTES # BLD AUTO: 0 X10(3)/MCL (ref 0–0.04)
IMM GRANULOCYTES NFR BLD AUTO: 0 %
LYMPHOCYTES # BLD AUTO: 3.12 X10(3)/MCL (ref 0.6–4.6)
LYMPHOCYTES NFR BLD AUTO: 39.6 %
MCH RBC QN AUTO: 25.5 PG (ref 27–31)
MCHC RBC AUTO-ENTMCNC: 31.2 G/DL (ref 33–36)
MCV RBC AUTO: 81.8 FL (ref 80–94)
MONOCYTES # BLD AUTO: 0.84 X10(3)/MCL (ref 0.1–1.3)
MONOCYTES NFR BLD AUTO: 10.7 %
NEUTROPHILS # BLD AUTO: 3.33 X10(3)/MCL (ref 2.1–9.2)
NEUTROPHILS NFR BLD AUTO: 42.4 %
PLATELET # BLD AUTO: 310 X10(3)/MCL (ref 130–400)
PMV BLD AUTO: 10.2 FL (ref 7.4–10.4)
POTASSIUM SERPL-SCNC: 3.5 MMOL/L (ref 3.5–5.1)
PROT SERPL-MCNC: 7.7 GM/DL (ref 6.4–8.3)
RBC # BLD AUTO: 3.96 X10(6)/MCL (ref 4.2–5.4)
SODIUM SERPL-SCNC: 141 MMOL/L (ref 136–145)
TROPONIN I SERPL-MCNC: <0.01 NG/ML (ref 0–0.04)
WBC # BLD AUTO: 7.87 X10(3)/MCL (ref 4.5–11.5)

## 2025-05-24 PROCEDURE — 25000003 PHARM REV CODE 250

## 2025-05-24 PROCEDURE — 93010 ELECTROCARDIOGRAM REPORT: CPT | Mod: ,,, | Performed by: STUDENT IN AN ORGANIZED HEALTH CARE EDUCATION/TRAINING PROGRAM

## 2025-05-24 PROCEDURE — 84484 ASSAY OF TROPONIN QUANT: CPT

## 2025-05-24 PROCEDURE — 85025 COMPLETE CBC W/AUTO DIFF WBC: CPT

## 2025-05-24 PROCEDURE — 80053 COMPREHEN METABOLIC PANEL: CPT

## 2025-05-24 PROCEDURE — 93005 ELECTROCARDIOGRAM TRACING: CPT

## 2025-05-24 PROCEDURE — 99285 EMERGENCY DEPT VISIT HI MDM: CPT | Mod: 25

## 2025-05-24 PROCEDURE — 83880 ASSAY OF NATRIURETIC PEPTIDE: CPT

## 2025-05-24 RX ORDER — ALUMINUM HYDROXIDE, MAGNESIUM HYDROXIDE, AND SIMETHICONE 1200; 120; 1200 MG/30ML; MG/30ML; MG/30ML
30 SUSPENSION ORAL ONCE
Status: COMPLETED | OUTPATIENT
Start: 2025-05-24 | End: 2025-05-24

## 2025-05-24 RX ORDER — LIDOCAINE HYDROCHLORIDE 20 MG/ML
15 SOLUTION OROPHARYNGEAL ONCE
Status: COMPLETED | OUTPATIENT
Start: 2025-05-24 | End: 2025-05-24

## 2025-05-24 RX ADMIN — LIDOCAINE HYDROCHLORIDE 15 ML: 20 SOLUTION ORAL at 05:05

## 2025-05-24 RX ADMIN — ALUMINUM HYDROXIDE, MAGNESIUM HYDROXIDE, AND SIMETHICONE 30 ML: 200; 200; 20 SUSPENSION ORAL at 05:05

## 2025-05-24 NOTE — ED PROVIDER NOTES
Encounter Date: 2025       History     Chief Complaint   Patient presents with    Chest Pain     Chest pain that started this AM. Feel like reflux but slightly different. Post op 1 month of anastomosis surgery for reflux.      53 y/o F w/pmhx asthma, GERD, gastric sleeve, cholecystectomy, tubal ligation, revision of GASTROJEJUNOSTOMY/pouch revision ROBOTIC ASSISTED with truncal vagotomy 1 mo ago. Patient complains of waking up with epigastric and chest pain immediately before presentation. She provides that her Dietician may believe she is eating too quickly which may cause pain, but patient does not connect pain to food or to exertion. She denies gas pains, belching. Endorses one episode nonbloody vomiting, yesterday and today mild SOB due to pain. Denies blood clots in the past. No cough.        Review of patient's allergies indicates:   Allergen Reactions    Aspirin      Other reaction(s): Bleeding  unable to take due to Gastric Bypass    Penicillin Rash     Past Medical History:   Diagnosis Date    Arthritis     Asthma     Esophageal spasm     GERD (gastroesophageal reflux disease)     Hidradenitis axillaris     bilateral, had surgery on right    Insulin resistance      Past Surgical History:   Procedure Laterality Date    AXILLARY HIDRADENITIS EXCISION  in 10/2018    Dr. Payan in [facility], right side, wound dehisced    BILATERAL TUBAL LIGATION       SECTION      CHOLECYSTECTOMY      COLONOSCOPY  2021    GASTRIC BYPASS      LAPAROSCOPIC GASTRIC BANDING      [place], [place]    TONSILLECTOMY       Family History   Problem Relation Name Age of Onset    Hypertension Mother      Diabetes Mother      Mitral valve prolapse Mother      Hypertension Father      Diabetes Father      Kidney cancer Father       Social History[1]  Review of Systems   Constitutional:  Negative for fever.   HENT:  Negative for sore throat.    Respiratory:  Positive for shortness of breath.    Cardiovascular:   Positive for chest pain. Negative for leg swelling.   Gastrointestinal:  Positive for abdominal pain, nausea and vomiting. Negative for constipation and diarrhea.   Genitourinary:  Negative for dysuria.       Physical Exam     Initial Vitals [05/24/25 0515]   BP Pulse Resp Temp SpO2   133/80 68 18 98.1 °F (36.7 °C) 98 %      MAP       --         Physical Exam    Constitutional: She appears well-developed and well-nourished.   HENT:   Head: Normocephalic and atraumatic.   Eyes: EOM are normal.   Cardiovascular:  Normal rate, regular rhythm and normal heart sounds.           No murmur heard.  Pulmonary/Chest: Breath sounds normal. No respiratory distress. She has no wheezes. She has no rhonchi. She has no rales. She exhibits tenderness.   Reproducible chest pain   Abdominal: Abdomen is soft. There is abdominal tenderness.   Epigastric tenderness   Musculoskeletal:         General: No tenderness or edema. Normal range of motion.     Neurological: She is alert and oriented to person, place, and time. GCS score is 15. GCS eye subscore is 4. GCS verbal subscore is 5. GCS motor subscore is 6.   Skin: Skin is warm and dry.   Psychiatric: She has a normal mood and affect. Her behavior is normal. Judgment and thought content normal.         ED Course   Procedures  Labs Reviewed   COMPREHENSIVE METABOLIC PANEL - Abnormal       Result Value    Sodium 141      Potassium 3.5      Chloride 106      CO2 27      Glucose 104 (*)     Blood Urea Nitrogen 18.8      Creatinine 0.64      Calcium 8.9      Protein Total 7.7      Albumin 3.3 (*)     Globulin 4.4 (*)     Albumin/Globulin Ratio 0.8 (*)     Bilirubin Total 0.3            ALT 14      AST 15      eGFR >60      Anion Gap 8.0      BUN/Creatinine Ratio 29     CBC WITH DIFFERENTIAL - Abnormal    WBC 7.87      RBC 3.96 (*)     Hgb 10.1 (*)     Hct 32.4 (*)     MCV 81.8      MCH 25.5 (*)     MCHC 31.2 (*)     RDW 13.8      Platelet 310      MPV 10.2      Neut % 42.4      Lymph %  39.6      Mono % 10.7      Eos % 6.7      Basophil % 0.6      Imm Grans % 0.0      Neut # 3.33      Lymph # 3.12      Mono # 0.84      Eos # 0.53      Baso # 0.05      Imm Gran # 0.00     TROPONIN I - Normal    Troponin-I <0.010     B-TYPE NATRIURETIC PEPTIDE - Normal    Natriuretic Peptide 23.5     CBC W/ AUTO DIFFERENTIAL    Narrative:     The following orders were created for panel order CBC auto differential.  Procedure                               Abnormality         Status                     ---------                               -----------         ------                     CBC with Differential[[id number]]       Abnormal            Final result                 Please view results for these tests on the individual orders.          Imaging Results              X-Ray Chest AP Portable (In process)                      Medications   aluminum-magnesium hydroxide-simethicone 200-200-20 mg/5 mL suspension 30 mL (30 mLs Oral Given 5/24/25 0532)     And   LIDOcaine viscous HCl 2% oral solution 15 mL (15 mLs Oral Given 5/24/25 0532)     Medical Decision Making  53 y/o F w/pmhx asthma, GERD, gastric sleeve, cholecystectomy, tubal ligation, revision of GASTROJEJUNOSTOMY/pouch revision ROBOTIC ASSISTED with truncal vagotomy 1 mo ago.  VSS  Exam notable for reproducible chest pain and epigastric tenderness.  DDX includes, but not limited to GERD, ACS, constipation, bowel gas pain, PE: Wells for PE 0  Labs show  CXR  EKG unremarkable, pain reproducible, GI Cocktail improved pain slightly.      Amount and/or Complexity of Data Reviewed  Labs: ordered.  Radiology: ordered.    Risk  OTC drugs.  Prescription drug management.              Attending Attestation:             Attending ED Notes:   Cardiac w/u wnl. Improved after gi cocktail. Gerd vs esophageal spasm most likely. Given ed return precautions                             Clinical Impression:  Final diagnoses:  [R07.9] Chest pain (Primary)          ED Disposition  Condition    Discharge Stable          ED Prescriptions    None       Follow-up Information    None                [1]   Social History  Tobacco Use    Smoking status: Never    Smokeless tobacco: Never   Substance Use Topics    Alcohol use: Yes     Comment: occasionally    Drug use: Never        Tomas Belcher MD  05/24/25 0706

## 2025-05-26 LAB
OHS QRS DURATION: 74 MS
OHS QTC CALCULATION: 437 MS

## 2025-05-30 DIAGNOSIS — M79.605 PAIN IN LEFT LEG: ICD-10-CM

## 2025-05-30 DIAGNOSIS — M79.604 PAIN IN RIGHT LEG: Primary | ICD-10-CM

## 2025-06-02 ENCOUNTER — HOSPITAL ENCOUNTER (OUTPATIENT)
Dept: RADIOLOGY | Facility: HOSPITAL | Age: 55
Discharge: HOME | End: 2025-06-02
Attending: FAMILY MEDICINE
Payer: MEDICAID

## 2025-06-02 DIAGNOSIS — M79.604 PAIN IN RIGHT LEG: ICD-10-CM

## 2025-06-02 DIAGNOSIS — M79.605 PAIN IN LEFT LEG: ICD-10-CM

## 2025-06-02 PROCEDURE — 93925 LOWER EXTREMITY STUDY: CPT | Mod: TC
